# Patient Record
Sex: MALE | Race: BLACK OR AFRICAN AMERICAN | NOT HISPANIC OR LATINO | URBAN - METROPOLITAN AREA
[De-identification: names, ages, dates, MRNs, and addresses within clinical notes are randomized per-mention and may not be internally consistent; named-entity substitution may affect disease eponyms.]

---

## 2023-05-09 ENCOUNTER — INPATIENT (INPATIENT)
Facility: HOSPITAL | Age: 38
LOS: 8 days | Discharge: HOME HEALTH | DRG: 219 | End: 2023-05-18
Attending: THORACIC SURGERY (CARDIOTHORACIC VASCULAR SURGERY) | Admitting: THORACIC SURGERY (CARDIOTHORACIC VASCULAR SURGERY)
Payer: MEDICAID

## 2023-05-09 VITALS
RESPIRATION RATE: 18 BRPM | TEMPERATURE: 99 F | SYSTOLIC BLOOD PRESSURE: 130 MMHG | HEART RATE: 66 BPM | DIASTOLIC BLOOD PRESSURE: 62 MMHG | OXYGEN SATURATION: 98 %

## 2023-05-09 DIAGNOSIS — I10 ESSENTIAL (PRIMARY) HYPERTENSION: ICD-10-CM

## 2023-05-09 DIAGNOSIS — E11.9 TYPE 2 DIABETES MELLITUS WITHOUT COMPLICATIONS: ICD-10-CM

## 2023-05-09 DIAGNOSIS — I25.10 ATHEROSCLEROTIC HEART DISEASE OF NATIVE CORONARY ARTERY WITHOUT ANGINA PECTORIS: ICD-10-CM

## 2023-05-09 DIAGNOSIS — E78.5 HYPERLIPIDEMIA, UNSPECIFIED: ICD-10-CM

## 2023-05-09 DIAGNOSIS — I35.1 NONRHEUMATIC AORTIC (VALVE) INSUFFICIENCY: ICD-10-CM

## 2023-05-09 PROBLEM — Z00.00 ENCOUNTER FOR PREVENTIVE HEALTH EXAMINATION: Status: ACTIVE | Noted: 2023-05-09

## 2023-05-09 PROCEDURE — 99222 1ST HOSP IP/OBS MODERATE 55: CPT

## 2023-05-09 RX ORDER — SODIUM CHLORIDE 9 MG/ML
3 INJECTION INTRAMUSCULAR; INTRAVENOUS; SUBCUTANEOUS EVERY 8 HOURS
Refills: 0 | Status: DISCONTINUED | OUTPATIENT
Start: 2023-05-09 | End: 2023-05-12

## 2023-05-09 RX ORDER — INSULIN LISPRO 100/ML
VIAL (ML) SUBCUTANEOUS
Refills: 0 | Status: DISCONTINUED | OUTPATIENT
Start: 2023-05-09 | End: 2023-05-12

## 2023-05-09 RX ORDER — FUROSEMIDE 40 MG
40 TABLET ORAL DAILY
Refills: 0 | Status: DISCONTINUED | OUTPATIENT
Start: 2023-05-09 | End: 2023-05-10

## 2023-05-09 RX ORDER — INSULIN LISPRO 100/ML
VIAL (ML) SUBCUTANEOUS AT BEDTIME
Refills: 0 | Status: DISCONTINUED | OUTPATIENT
Start: 2023-05-09 | End: 2023-05-12

## 2023-05-09 NOTE — H&P ADULT - ASSESSMENT
37M, PmHx DM, HTN, HLD, "heart murmur secondary to an infection in my blood (3/2023)," Now presents to [facility] from [facility], for Severe AI/endocarditis eval. Patient initially presented to [facility] for evaluation of cough and shortness of breath x 4 days. Patient states that over the course the last 4 days he has been experiencing shortness of breath as well as cough productive of white to yellow sputum, and states that there was a small blood streak in the sputum today. Patient states that he has been experiencing chest discomfort for the last 2 days as well, and points to the epigastric and left side of the chest when asked where the pain is localized. Patient states that the shortness of breath is worse when laying down, and states he has never had this before. PE ruled out at [facility] and Negative troponins. Of note Patient states he has step bacteremia in [facility] in March 2023 and was send for on antibiotics which he completed in April 2023. Denies any fever, chills, palpitations, headache, dizziness, abdominal pain, nausea, vomiting, diarrhea, constipation, back pain, tearing sensation, pedal edema, calf discomfort.

## 2023-05-09 NOTE — H&P ADULT - NSHPPHYSICALEXAM_GEN_ALL_CORE
General: NAD  HEENT:  NC/AT  Neuro: A&Ox4, gait steady, speech clear, no focal deficits noted  Respiratory: BS CTA b/l, no wheezes, rales or rhonchi noted  Cardiovascular: RRR, (+) S1/S2, no murmur appreciated  GI: Abd soft, NT/ND, (+) BSx4Q (+) BM  Peripheral Vascular:  no LE edema b/l, 2+ peripheral pulses b/l, no varicosities/PVD noted  Musculoskeletal: B/L UE and LE 5/5 strength   Psychiatric: Normal mood, normal affect observed  Skin: Normal exam to inspection and palpation. no bleeding, no hematoma. General: NAD  HEENT:  NC/AT  Neuro: A&Ox4, gait steady, speech clear, no focal deficits noted  Respiratory: BS CTA b/l, no wheezes, rales or rhonchi noted  Cardiovascular: RRR, (+) S1/S2,  GI: Abd soft, NT/ND, (+) BSx4Q (+) BM  Peripheral Vascular:  no LE edema b/l, 2+ peripheral pulses b/l, no varicosities/PVD noted  Musculoskeletal: B/L UE and LE 5/5 strength   Psychiatric: Normal mood, normal affect observed  Skin: Normal exam to inspection and palpation. no bleeding, no hematoma.

## 2023-05-09 NOTE — H&P ADULT - HISTORY OF PRESENT ILLNESS
37M, PmHx DM, HTN, HLD, "heart murmur secondary to an infection in my blood (3/2023)," Now presents to [facility] from [facility], for Severe AI/endocarditis eval. Patient initially presented to [facility] for evaluation of cough and shortness of breath x 4 days. Patient states that over the course the last 4 days he has been experiencing shortness of breath as well as cough productive of white to yellow sputum, and states that there was a small blood streak in the sputum today. Patient states that he has been experiencing chest discomfort for the last 2 days as well, and points to the epigastric and left side of the chest when asked where the pain is localized. Patient states that the shortness of breath is worse when laying down, and states he has never had this before. PE ruled out at [facility] and Negative troponins. Of note Patient states he has step bacteremia in [facility] in March 2023 and was send for on antibiotics which he completed in April 2023. Denies any fever, chills, palpitations, headache, dizziness, abdominal pain, nausea, vomiting, diarrhea, constipation, back pain, tearing sensation, pedal edema, calf discomfort.

## 2023-05-09 NOTE — H&P ADULT - NSHPSOCIALHISTORY_GEN_ALL_CORE
SOCIAL HISTORY  Smoker: [ ] Yes  [X] No  ETOH use: [ ] Yes  [X] No  Illicit Drug use:  [ ] Yes  [X] No  Chemo/Rad to chest: No  Implantable Devices: No  ADLs: Independent  Residence: Lives at home SOCIAL HISTORY  Smoker: [ x] Yes  [] No    Patient states he does smoke weed occasionally   ETOH use: [ x] Yes  [] No   Patient states occasionally   Illicit Drug use:  [ ] Yes  [] No  Patient states he does smoke weed occasionally   Chemo/Rad to chest: No  Implantable Devices: No  ADLs: Independent  Residence: Lives at home with significant other

## 2023-05-09 NOTE — H&P ADULT - PROBLEM SELECTOR PLAN 1
Admit to Dr. Chavez  Preop workup initiated - Carotids, PFTs, Labs  Full Echo  Dental consult  ID consult Dr. Kirsten Mclean of Recent bacteremia/endocarditis Blood cultures sent  Complete Abx course on April 10th 2023  c/w Lasix 40 QD from [place] Admit to Dr. Chavez  Preop workup initiated - Carotids, PFTs, Labs  Full Echo  CXR  Dental consult  ID consult Dr. Curtis  Hx of Recent bacteremia/endocarditis Blood cultures sent  Complete Abx course on April 10th 2023  c/w Lasix 40 QD from [place]

## 2023-05-09 NOTE — H&P ADULT - NSHPREVIEWOFSYSTEMS_GEN_ALL_CORE
REVIEW OF SYSTEMS:  CONSTITUTIONAL: Denies fever, weight loss or fatigue  EYES: Denies eye pain, visual disturbances, or discharge  ENMT:  Denies difficulty hearing, tinnitus, vertigo, sinus or throat pain  NECK: Denies pain or stiffness  RESPIRATORY: + shortness of breath Denies cough, wheezing, chills, hemoptysis   CARDIOVASCULAR: Denies current chest pain, palpitations, dizziness, or leg swelling  GASTROINTESTINAL: Denies abdominal or epigastric pain, nausea, vomiting, hematemesis, diarrhea or melena  GENITOURINARY: Denies dysuria, frequency, hematuria, or incontinence  NEUROLOGICAL: Denies headaches, memory loss, loss of strength, numbness or tremors  SKIN: Denies itching, burning, rashes, or lesions   LYMPH NODES: Denies enlarged glands  ENDOCRINE: Denies heat or cold intolerance or hair loss  MUSCULOSKELETAL: Denies joint pain or swelling, muscle, back or extremity pain  PSYCHIATRIC: Denies depression, anxiety, mood swings or difficulty sleeping  HEME/LYMPH: Denies easy bruising or bleeding gums  ALLERGY: Denies hives or eczema

## 2023-05-10 ENCOUNTER — TRANSCRIPTION ENCOUNTER (OUTPATIENT)
Age: 38
End: 2023-05-10

## 2023-05-10 LAB
A1C WITH ESTIMATED AVERAGE GLUCOSE RESULT: 5.3 % — SIGNIFICANT CHANGE UP (ref 4–5.6)
ALBUMIN SERPL ELPH-MCNC: 4.4 G/DL — SIGNIFICANT CHANGE UP (ref 3.3–5)
ALBUMIN SERPL ELPH-MCNC: 4.4 G/DL — SIGNIFICANT CHANGE UP (ref 3.3–5)
ALP SERPL-CCNC: 49 U/L — SIGNIFICANT CHANGE UP (ref 40–120)
ALP SERPL-CCNC: 51 U/L — SIGNIFICANT CHANGE UP (ref 40–120)
ALT FLD-CCNC: 21 U/L — SIGNIFICANT CHANGE UP (ref 10–45)
ALT FLD-CCNC: 23 U/L — SIGNIFICANT CHANGE UP (ref 10–45)
ANION GAP SERPL CALC-SCNC: 14 MMOL/L — SIGNIFICANT CHANGE UP (ref 5–17)
ANION GAP SERPL CALC-SCNC: 16 MMOL/L — SIGNIFICANT CHANGE UP (ref 5–17)
APPEARANCE UR: CLEAR — SIGNIFICANT CHANGE UP
APTT BLD: 33.6 SEC — SIGNIFICANT CHANGE UP (ref 27.5–35.5)
AST SERPL-CCNC: 17 U/L — SIGNIFICANT CHANGE UP (ref 10–40)
AST SERPL-CCNC: 18 U/L — SIGNIFICANT CHANGE UP (ref 10–40)
BACTERIA # UR AUTO: NEGATIVE — SIGNIFICANT CHANGE UP
BASOPHILS # BLD AUTO: 0.04 K/UL — SIGNIFICANT CHANGE UP (ref 0–0.2)
BASOPHILS # BLD AUTO: 0.04 K/UL — SIGNIFICANT CHANGE UP (ref 0–0.2)
BASOPHILS NFR BLD AUTO: 0.6 % — SIGNIFICANT CHANGE UP (ref 0–2)
BASOPHILS NFR BLD AUTO: 0.6 % — SIGNIFICANT CHANGE UP (ref 0–2)
BILIRUB SERPL-MCNC: 0.6 MG/DL — SIGNIFICANT CHANGE UP (ref 0.2–1.2)
BILIRUB SERPL-MCNC: 0.6 MG/DL — SIGNIFICANT CHANGE UP (ref 0.2–1.2)
BILIRUB UR-MCNC: NEGATIVE — SIGNIFICANT CHANGE UP
BUN SERPL-MCNC: 23 MG/DL — SIGNIFICANT CHANGE UP (ref 7–23)
BUN SERPL-MCNC: 24 MG/DL — HIGH (ref 7–23)
CALCIUM SERPL-MCNC: 9.2 MG/DL — SIGNIFICANT CHANGE UP (ref 8.4–10.5)
CALCIUM SERPL-MCNC: 9.2 MG/DL — SIGNIFICANT CHANGE UP (ref 8.4–10.5)
CHLORIDE SERPL-SCNC: 100 MMOL/L — SIGNIFICANT CHANGE UP (ref 96–108)
CHLORIDE SERPL-SCNC: 101 MMOL/L — SIGNIFICANT CHANGE UP (ref 96–108)
CO2 SERPL-SCNC: 23 MMOL/L — SIGNIFICANT CHANGE UP (ref 22–31)
CO2 SERPL-SCNC: 24 MMOL/L — SIGNIFICANT CHANGE UP (ref 22–31)
COLOR SPEC: YELLOW — SIGNIFICANT CHANGE UP
CREAT SERPL-MCNC: 1.25 MG/DL — SIGNIFICANT CHANGE UP (ref 0.5–1.3)
CREAT SERPL-MCNC: 1.28 MG/DL — SIGNIFICANT CHANGE UP (ref 0.5–1.3)
DIFF PNL FLD: NEGATIVE — SIGNIFICANT CHANGE UP
EGFR: 73 ML/MIN/1.73M2 — SIGNIFICANT CHANGE UP
EGFR: 76 ML/MIN/1.73M2 — SIGNIFICANT CHANGE UP
EOSINOPHIL # BLD AUTO: 0.19 K/UL — SIGNIFICANT CHANGE UP (ref 0–0.5)
EOSINOPHIL # BLD AUTO: 0.2 K/UL — SIGNIFICANT CHANGE UP (ref 0–0.5)
EOSINOPHIL NFR BLD AUTO: 2.8 % — SIGNIFICANT CHANGE UP (ref 0–6)
EOSINOPHIL NFR BLD AUTO: 3.1 % — SIGNIFICANT CHANGE UP (ref 0–6)
EPI CELLS # UR: 0 /HPF — SIGNIFICANT CHANGE UP
ESTIMATED AVERAGE GLUCOSE: 105 MG/DL — SIGNIFICANT CHANGE UP (ref 68–114)
FIBRINOGEN PPP-MCNC: 457 MG/DL — HIGH (ref 200–445)
GLUCOSE BLDC GLUCOMTR-MCNC: 100 MG/DL — HIGH (ref 70–99)
GLUCOSE BLDC GLUCOMTR-MCNC: 106 MG/DL — HIGH (ref 70–99)
GLUCOSE BLDC GLUCOMTR-MCNC: 130 MG/DL — HIGH (ref 70–99)
GLUCOSE BLDC GLUCOMTR-MCNC: 91 MG/DL — SIGNIFICANT CHANGE UP (ref 70–99)
GLUCOSE SERPL-MCNC: 109 MG/DL — HIGH (ref 70–99)
GLUCOSE SERPL-MCNC: 93 MG/DL — SIGNIFICANT CHANGE UP (ref 70–99)
GLUCOSE UR QL: NEGATIVE — SIGNIFICANT CHANGE UP
HCT VFR BLD CALC: 36.7 % — LOW (ref 39–50)
HCT VFR BLD CALC: 37 % — LOW (ref 39–50)
HGB BLD-MCNC: 11.8 G/DL — LOW (ref 13–17)
HGB BLD-MCNC: 11.9 G/DL — LOW (ref 13–17)
HYALINE CASTS # UR AUTO: 2 /LPF — SIGNIFICANT CHANGE UP (ref 0–2)
IMM GRANULOCYTES NFR BLD AUTO: 0.3 % — SIGNIFICANT CHANGE UP (ref 0–0.9)
IMM GRANULOCYTES NFR BLD AUTO: 0.3 % — SIGNIFICANT CHANGE UP (ref 0–0.9)
INR BLD: 1.3 RATIO — HIGH (ref 0.88–1.16)
KETONES UR-MCNC: NEGATIVE — SIGNIFICANT CHANGE UP
LEUKOCYTE ESTERASE UR-ACNC: NEGATIVE — SIGNIFICANT CHANGE UP
LYMPHOCYTES # BLD AUTO: 2.47 K/UL — SIGNIFICANT CHANGE UP (ref 1–3.3)
LYMPHOCYTES # BLD AUTO: 2.75 K/UL — SIGNIFICANT CHANGE UP (ref 1–3.3)
LYMPHOCYTES # BLD AUTO: 38.9 % — SIGNIFICANT CHANGE UP (ref 13–44)
LYMPHOCYTES # BLD AUTO: 40.6 % — SIGNIFICANT CHANGE UP (ref 13–44)
MAGNESIUM SERPL-MCNC: 2.1 MG/DL — SIGNIFICANT CHANGE UP (ref 1.6–2.6)
MCHC RBC-ENTMCNC: 27.2 PG — SIGNIFICANT CHANGE UP (ref 27–34)
MCHC RBC-ENTMCNC: 27.3 PG — SIGNIFICANT CHANGE UP (ref 27–34)
MCHC RBC-ENTMCNC: 32.2 GM/DL — SIGNIFICANT CHANGE UP (ref 32–36)
MCHC RBC-ENTMCNC: 32.2 GM/DL — SIGNIFICANT CHANGE UP (ref 32–36)
MCV RBC AUTO: 84.5 FL — SIGNIFICANT CHANGE UP (ref 80–100)
MCV RBC AUTO: 85 FL — SIGNIFICANT CHANGE UP (ref 80–100)
MONOCYTES # BLD AUTO: 0.69 K/UL — SIGNIFICANT CHANGE UP (ref 0–0.9)
MONOCYTES # BLD AUTO: 0.73 K/UL — SIGNIFICANT CHANGE UP (ref 0–0.9)
MONOCYTES NFR BLD AUTO: 10.2 % — SIGNIFICANT CHANGE UP (ref 2–14)
MONOCYTES NFR BLD AUTO: 11.5 % — SIGNIFICANT CHANGE UP (ref 2–14)
MRSA PCR RESULT.: SIGNIFICANT CHANGE UP
NEUTROPHILS # BLD AUTO: 2.89 K/UL — SIGNIFICANT CHANGE UP (ref 1.8–7.4)
NEUTROPHILS # BLD AUTO: 3.09 K/UL — SIGNIFICANT CHANGE UP (ref 1.8–7.4)
NEUTROPHILS NFR BLD AUTO: 45.5 % — SIGNIFICANT CHANGE UP (ref 43–77)
NEUTROPHILS NFR BLD AUTO: 45.6 % — SIGNIFICANT CHANGE UP (ref 43–77)
NITRITE UR-MCNC: NEGATIVE — SIGNIFICANT CHANGE UP
NRBC # BLD: 0 /100 WBCS — SIGNIFICANT CHANGE UP (ref 0–0)
NRBC # BLD: 0 /100 WBCS — SIGNIFICANT CHANGE UP (ref 0–0)
NT-PROBNP SERPL-SCNC: 850 PG/ML — HIGH (ref 0–300)
PH UR: 6 — SIGNIFICANT CHANGE UP (ref 5–8)
PHOSPHATE SERPL-MCNC: 4.3 MG/DL — SIGNIFICANT CHANGE UP (ref 2.5–4.5)
PLATELET # BLD AUTO: 263 K/UL — SIGNIFICANT CHANGE UP (ref 150–400)
PLATELET # BLD AUTO: 267 K/UL — SIGNIFICANT CHANGE UP (ref 150–400)
POTASSIUM SERPL-MCNC: 3.1 MMOL/L — LOW (ref 3.5–5.3)
POTASSIUM SERPL-MCNC: 3.3 MMOL/L — LOW (ref 3.5–5.3)
POTASSIUM SERPL-MCNC: 3.8 MMOL/L — SIGNIFICANT CHANGE UP (ref 3.5–5.3)
POTASSIUM SERPL-SCNC: 3.1 MMOL/L — LOW (ref 3.5–5.3)
POTASSIUM SERPL-SCNC: 3.3 MMOL/L — LOW (ref 3.5–5.3)
POTASSIUM SERPL-SCNC: 3.8 MMOL/L — SIGNIFICANT CHANGE UP (ref 3.5–5.3)
PREALB SERPL-MCNC: 21 MG/DL — SIGNIFICANT CHANGE UP (ref 20–40)
PROT SERPL-MCNC: 7.6 G/DL — SIGNIFICANT CHANGE UP (ref 6–8.3)
PROT SERPL-MCNC: 7.7 G/DL — SIGNIFICANT CHANGE UP (ref 6–8.3)
PROT UR-MCNC: ABNORMAL
PROTHROM AB SERPL-ACNC: 15.1 SEC — HIGH (ref 10.5–13.4)
RBC # BLD: 4.32 M/UL — SIGNIFICANT CHANGE UP (ref 4.2–5.8)
RBC # BLD: 4.38 M/UL — SIGNIFICANT CHANGE UP (ref 4.2–5.8)
RBC # FLD: 14.2 % — SIGNIFICANT CHANGE UP (ref 10.3–14.5)
RBC # FLD: 14.3 % — SIGNIFICANT CHANGE UP (ref 10.3–14.5)
RBC CASTS # UR COMP ASSIST: 1 /HPF — SIGNIFICANT CHANGE UP (ref 0–4)
S AUREUS DNA NOSE QL NAA+PROBE: SIGNIFICANT CHANGE UP
SODIUM SERPL-SCNC: 139 MMOL/L — SIGNIFICANT CHANGE UP (ref 135–145)
SODIUM SERPL-SCNC: 139 MMOL/L — SIGNIFICANT CHANGE UP (ref 135–145)
SP GR SPEC: 1.03 — HIGH (ref 1.01–1.02)
T4 FREE SERPL-MCNC: 1.6 NG/DL — SIGNIFICANT CHANGE UP (ref 0.9–1.8)
TSH SERPL-MCNC: 1.04 UIU/ML — SIGNIFICANT CHANGE UP (ref 0.27–4.2)
UROBILINOGEN FLD QL: NEGATIVE — SIGNIFICANT CHANGE UP
WBC # BLD: 6.35 K/UL — SIGNIFICANT CHANGE UP (ref 3.8–10.5)
WBC # BLD: 6.78 K/UL — SIGNIFICANT CHANGE UP (ref 3.8–10.5)
WBC # FLD AUTO: 6.35 K/UL — SIGNIFICANT CHANGE UP (ref 3.8–10.5)
WBC # FLD AUTO: 6.78 K/UL — SIGNIFICANT CHANGE UP (ref 3.8–10.5)
WBC UR QL: 1 /HPF — SIGNIFICANT CHANGE UP (ref 0–5)

## 2023-05-10 PROCEDURE — 93356 MYOCRD STRAIN IMG SPCKL TRCK: CPT

## 2023-05-10 PROCEDURE — 75574 CT ANGIO HRT W/3D IMAGE: CPT | Mod: 26

## 2023-05-10 PROCEDURE — 93010 ELECTROCARDIOGRAM REPORT: CPT

## 2023-05-10 PROCEDURE — 99231 SBSQ HOSP IP/OBS SF/LOW 25: CPT

## 2023-05-10 PROCEDURE — 94010 BREATHING CAPACITY TEST: CPT | Mod: 26

## 2023-05-10 PROCEDURE — 99222 1ST HOSP IP/OBS MODERATE 55: CPT

## 2023-05-10 PROCEDURE — 71045 X-RAY EXAM CHEST 1 VIEW: CPT | Mod: 26

## 2023-05-10 PROCEDURE — 93880 EXTRACRANIAL BILAT STUDY: CPT | Mod: 26

## 2023-05-10 PROCEDURE — 93306 TTE W/DOPPLER COMPLETE: CPT | Mod: 26

## 2023-05-10 PROCEDURE — 70496 CT ANGIOGRAPHY HEAD: CPT | Mod: 26

## 2023-05-10 RX ORDER — POTASSIUM CHLORIDE 20 MEQ
20 PACKET (EA) ORAL DAILY
Refills: 0 | Status: DISCONTINUED | OUTPATIENT
Start: 2023-05-11 | End: 2023-05-12

## 2023-05-10 RX ORDER — ENOXAPARIN SODIUM 100 MG/ML
40 INJECTION SUBCUTANEOUS EVERY 24 HOURS
Refills: 0 | Status: DISCONTINUED | OUTPATIENT
Start: 2023-05-10 | End: 2023-05-10

## 2023-05-10 RX ORDER — HEPARIN SODIUM 5000 [USP'U]/ML
5000 INJECTION INTRAVENOUS; SUBCUTANEOUS EVERY 12 HOURS
Refills: 0 | Status: DISCONTINUED | OUTPATIENT
Start: 2023-05-10 | End: 2023-05-12

## 2023-05-10 RX ORDER — FUROSEMIDE 40 MG
40 TABLET ORAL ONCE
Refills: 0 | Status: COMPLETED | OUTPATIENT
Start: 2023-05-11 | End: 2023-05-11

## 2023-05-10 RX ORDER — POTASSIUM CHLORIDE 20 MEQ
20 PACKET (EA) ORAL
Refills: 0 | Status: COMPLETED | OUTPATIENT
Start: 2023-05-10 | End: 2023-05-10

## 2023-05-10 RX ORDER — POTASSIUM BICARBONATE 978 MG/1
25 TABLET, EFFERVESCENT ORAL
Refills: 0 | Status: DISCONTINUED | OUTPATIENT
Start: 2023-05-10 | End: 2023-05-12

## 2023-05-10 RX ADMIN — SODIUM CHLORIDE 3 MILLILITER(S): 9 INJECTION INTRAMUSCULAR; INTRAVENOUS; SUBCUTANEOUS at 21:49

## 2023-05-10 RX ADMIN — ENOXAPARIN SODIUM 40 MILLIGRAM(S): 100 INJECTION SUBCUTANEOUS at 06:17

## 2023-05-10 RX ADMIN — POTASSIUM BICARBONATE 25 MILLIEQUIVALENT(S): 978 TABLET, EFFERVESCENT ORAL at 07:18

## 2023-05-10 RX ADMIN — POTASSIUM BICARBONATE 25 MILLIEQUIVALENT(S): 978 TABLET, EFFERVESCENT ORAL at 08:12

## 2023-05-10 RX ADMIN — Medication 40 MILLIGRAM(S): at 07:18

## 2023-05-10 RX ADMIN — SODIUM CHLORIDE 3 MILLILITER(S): 9 INJECTION INTRAMUSCULAR; INTRAVENOUS; SUBCUTANEOUS at 07:19

## 2023-05-10 RX ADMIN — SODIUM CHLORIDE 3 MILLILITER(S): 9 INJECTION INTRAMUSCULAR; INTRAVENOUS; SUBCUTANEOUS at 13:40

## 2023-05-10 RX ADMIN — HEPARIN SODIUM 5000 UNIT(S): 5000 INJECTION INTRAVENOUS; SUBCUTANEOUS at 18:06

## 2023-05-10 RX ADMIN — Medication 20 MILLIEQUIVALENT(S): at 04:20

## 2023-05-10 RX ADMIN — Medication 20 MILLIEQUIVALENT(S): at 02:19

## 2023-05-10 NOTE — PROGRESS NOTE ADULT - SUBJECTIVE AND OBJECTIVE BOX
VITAL SIGNS    Subjective: "I'm feeling ok" Denies CP, palpitation, SOB, BRUNSON, HA, dizziness, N/V/D, fever or chills.  No acute event noted overnight.     Telemetry:  SB / SR 50-70     Vital Signs Last 24 Hrs  T(C): 36.6 (05-10-23 @ 11:58), Max: 37 (05-09-23 @ 21:20)  T(F): 97.9 (05-10-23 @ 11:58), Max: 98.6 (05-09-23 @ 21:20)  HR: 69 (05-10-23 @ 11:58) (61 - 69)  BP: 127/61 (05-10-23 @ 11:58) (112/66 - 130/62)  RR: 18 (05-10-23 @ 11:58) (18 - 18)  SpO2: 95% (05-10-23 @ 11:58) (95% - 98%)           05-10 @ 07:01  -  05-10 @ 12:51  --------------------------------------------------------  IN: 360 mL / OUT: 0 mL / NET: 360 mL      LABS  05-10    x   |  x   |  x   ----------------------------<  x   3.8   |  x   |  x     Ca    9.2      10 May 2023 05:39  Phos  4.3     05-10  Mg     2.1     05-10    TPro  7.6  /  Alb  4.4  /  TBili  0.6  /  DBili  x   /  AST  18  /  ALT  23  /  AlkPhos  49  05-10                                 11.9   6.35  )-----------( 263      ( 10 May 2023 05:41 )             37.0          PT/INR - ( 10 May 2023 01:13 )   PT: 15.1 sec;   INR: 1.30 ratio         PTT - ( 10 May 2023 01:13 )  PTT:33.6 sec        Daily Height in cm: 167.64 (10 May 2023 02:49)    Daily       CAPILLARY BLOOD GLUCOSE      POCT Blood Glucose.: 106 mg/dL (10 May 2023 11:36)  POCT Blood Glucose.: 91 mg/dL (10 May 2023 08:04)              PHYSICAL EXAM    Neurology: alert and oriented x 3, nonfocal, no gross deficits    CV: (+) Diastolic murmur    Lungs: CTA B/L     Abdomen: soft, nontender, nondistended, positive bowel sounds, (+) Flatus; (+) BM     :  Voiding               Extremities:  B/L LE (-) edema; negative calf tenderness; (+) 2 DP palpable        enoxaparin Injectable 40 milliGRAM(s) SubCutaneous every 24 hours  furosemide Tablet 40 milliGRAM(s) Oral daily  insulin lispro (ADMELOG) corrective regimen sliding scale SubCutaneous at bedtime  insulin lispro (ADMELOG) corrective regimen sliding scale SubCutaneous three times a day before meals  potassium bicarbonate/potassium citrate 25 milliEquivalent(s) Oral every 1 hour  sodium chloride 0.9% lock flush 3 milliLiter(s) IV Push every 8 hours    Physical Therapy Rec:   Home  [ X ]   Home w/ PT  [  ]  Rehab  [  ]    Discussed with Cardiothoracic Team at AM rounds.

## 2023-05-10 NOTE — DISCHARGE NOTE NURSING/CASE MANAGEMENT/SOCIAL WORK - PATIENT PORTAL LINK FT
You can access the FollowMyHealth Patient Portal offered by [facility] by registering at the following website: [email or web address]. By joining [employer]’s FollowMyHealth portal, you will also be able to view your health information using other applications (apps) compatible with our system.

## 2023-05-10 NOTE — PROGRESS NOTE ADULT - SUBJECTIVE AND OBJECTIVE BOX
VITAL SIGNS    Subjective:     Telemetry:      Vital Signs Last 24 Hrs  T(C): 36.6 (05-10-23 @ 11:58), Max: 37 (05-09-23 @ 21:20)  T(F): 97.9 (05-10-23 @ 11:58), Max: 98.6 (05-09-23 @ 21:20)  HR: 69 (05-10-23 @ 11:58) (61 - 69)  BP: 127/61 (05-10-23 @ 11:58) (112/66 - 130/62)  RR: 18 (05-10-23 @ 11:58) (18 - 18)  SpO2: 95% (05-10-23 @ 11:58) (95% - 98%)               05-10 @ 07:01  -  05-10 @ 12:44  --------------------------------------------------------  IN: 360 mL / OUT: 0 mL / NET: 360 mL        LABS  05-10    x   |  x   |  x   ----------------------------<  x   3.8   |  x   |  x     Ca    9.2      10 May 2023 05:39  Phos  4.3     05-10  Mg     2.1     05-10    TPro  7.6  /  Alb  4.4  /  TBili  0.6  /  DBili  x   /  AST  18  /  ALT  23  /  AlkPhos  49  05-10                                 11.9   6.35  )-----------( 263      ( 10 May 2023 05:41 )             37.0          PT/INR - ( 10 May 2023 01:13 )   PT: 15.1 sec;   INR: 1.30 ratio         PTT - ( 10 May 2023 01:13 )  PTT:33.6 sec        Daily Height in cm: 167.64 (10 May 2023 02:49)    Daily       CAPILLARY BLOOD GLUCOSE      POCT Blood Glucose.: 106 mg/dL (10 May 2023 11:36)  POCT Blood Glucose.: 91 mg/dL (10 May 2023 08:04)          Drains:     MS         [  ] Drainage:                 L Pleural  [  ]  Drainage:                R Pleural  [  ]  Drainage:    Pacing Wires        [  ]   Settings:                                  Isolated  [  ]    Coumadin    [ ] YES          [  ]      NO         Reason:           PHYSICAL EXAM      Neurology: alert and oriented x 3, nonfocal, no gross deficits    CV: (+)     Sternal Wound: MSI -->CDI, sternum stable    Lungs: CTA B/L     Abdomen: soft, nontender, nondistended, positive bowel sounds, (+) Flatus; (+) BM     :  Voiding               Extremities:  B/L LE (+) edema; negative calf tenderness; (+) 2 DP palpable          enoxaparin Injectable 40 milliGRAM(s) SubCutaneous every 24 hours  furosemide    Tablet 40 milliGRAM(s) Oral daily  insulin lispro (ADMELOG) corrective regimen sliding scale   SubCutaneous three times a day before meals  insulin lispro (ADMELOG) corrective regimen sliding scale   SubCutaneous at bedtime  potassium bicarbonate/potassium citrate 25 milliEquivalent(s) Oral every 1 hour  sodium chloride 0.9% lock flush 3 milliLiter(s) IV Push every 8 hours                 Physical Therapy Rec:   Home  [  ]   Home w/ PT  [  ]  Rehab  [  ]    Discussed with Cardiothoracic Team at AM rounds.

## 2023-05-10 NOTE — PROGRESS NOTE ADULT - PROBLEM SELECTOR PLAN 1
Preop workup initiated - Carotids, PFTs, Labs  Echo today   CTA head ordered to r/o mycotic aneurysm.    CT of heart with cors ordered for pre op cardiac surgery work up.   Dental consult called and appreciated   ID consult Dr. Curtis called and appreciated observe off Abx for now   Hx of Recent bacteremia/endocarditis Blood cultures sent  Complete Abx course on April 10th 2023  Continue with Lasix 40 QD   Supplement K+ 3.3  OR tentative for Friday 5/12.

## 2023-05-10 NOTE — PATIENT PROFILE ADULT - FALL HARM RISK - HARM RISK INTERVENTIONS

## 2023-05-11 ENCOUNTER — TRANSCRIPTION ENCOUNTER (OUTPATIENT)
Age: 38
End: 2023-05-11

## 2023-05-11 LAB
ANION GAP SERPL CALC-SCNC: 14 MMOL/L — SIGNIFICANT CHANGE UP (ref 5–17)
BLD GP AB SCN SERPL QL: NEGATIVE — SIGNIFICANT CHANGE UP
BUN SERPL-MCNC: 18 MG/DL — SIGNIFICANT CHANGE UP (ref 7–23)
CALCIUM SERPL-MCNC: 9.4 MG/DL — SIGNIFICANT CHANGE UP (ref 8.4–10.5)
CHLORIDE SERPL-SCNC: 100 MMOL/L — SIGNIFICANT CHANGE UP (ref 96–108)
CO2 SERPL-SCNC: 24 MMOL/L — SIGNIFICANT CHANGE UP (ref 22–31)
CREAT SERPL-MCNC: 1.07 MG/DL — SIGNIFICANT CHANGE UP (ref 0.5–1.3)
EGFR: 91 ML/MIN/1.73M2 — SIGNIFICANT CHANGE UP
GLUCOSE BLDC GLUCOMTR-MCNC: 107 MG/DL — HIGH (ref 70–99)
GLUCOSE BLDC GLUCOMTR-MCNC: 107 MG/DL — HIGH (ref 70–99)
GLUCOSE BLDC GLUCOMTR-MCNC: 109 MG/DL — HIGH (ref 70–99)
GLUCOSE BLDC GLUCOMTR-MCNC: 90 MG/DL — SIGNIFICANT CHANGE UP (ref 70–99)
GLUCOSE SERPL-MCNC: 108 MG/DL — HIGH (ref 70–99)
HCT VFR BLD CALC: 37.7 % — LOW (ref 39–50)
HGB BLD-MCNC: 12.2 G/DL — LOW (ref 13–17)
MCHC RBC-ENTMCNC: 27.7 PG — SIGNIFICANT CHANGE UP (ref 27–34)
MCHC RBC-ENTMCNC: 32.4 GM/DL — SIGNIFICANT CHANGE UP (ref 32–36)
MCV RBC AUTO: 85.5 FL — SIGNIFICANT CHANGE UP (ref 80–100)
NRBC # BLD: 0 /100 WBCS — SIGNIFICANT CHANGE UP (ref 0–0)
PLATELET # BLD AUTO: 253 K/UL — SIGNIFICANT CHANGE UP (ref 150–400)
POTASSIUM SERPL-MCNC: 3.5 MMOL/L — SIGNIFICANT CHANGE UP (ref 3.5–5.3)
POTASSIUM SERPL-SCNC: 3.5 MMOL/L — SIGNIFICANT CHANGE UP (ref 3.5–5.3)
RBC # BLD: 4.41 M/UL — SIGNIFICANT CHANGE UP (ref 4.2–5.8)
RBC # FLD: 14 % — SIGNIFICANT CHANGE UP (ref 10.3–14.5)
RH IG SCN BLD-IMP: POSITIVE — SIGNIFICANT CHANGE UP
SODIUM SERPL-SCNC: 138 MMOL/L — SIGNIFICANT CHANGE UP (ref 135–145)
WBC # BLD: 6.41 K/UL — SIGNIFICANT CHANGE UP (ref 3.8–10.5)
WBC # FLD AUTO: 6.41 K/UL — SIGNIFICANT CHANGE UP (ref 3.8–10.5)

## 2023-05-11 PROCEDURE — 99232 SBSQ HOSP IP/OBS MODERATE 35: CPT

## 2023-05-11 PROCEDURE — 99232 SBSQ HOSP IP/OBS MODERATE 35: CPT | Mod: 24

## 2023-05-11 RX ORDER — CHLORHEXIDINE GLUCONATE 213 G/1000ML
30 SOLUTION TOPICAL ONCE
Refills: 0 | Status: DISCONTINUED | OUTPATIENT
Start: 2023-05-11 | End: 2023-05-12

## 2023-05-11 RX ORDER — PHYTONADIONE (VIT K1) 5 MG
10 TABLET ORAL ONCE
Refills: 0 | Status: COMPLETED | OUTPATIENT
Start: 2023-05-11 | End: 2023-05-11

## 2023-05-11 RX ORDER — ASCORBIC ACID 60 MG
2000 TABLET,CHEWABLE ORAL ONCE
Refills: 0 | Status: DISCONTINUED | OUTPATIENT
Start: 2023-05-11 | End: 2023-05-12

## 2023-05-11 RX ORDER — CHLORHEXIDINE GLUCONATE 213 G/1000ML
1 SOLUTION TOPICAL ONCE
Refills: 0 | Status: COMPLETED | OUTPATIENT
Start: 2023-05-11 | End: 2023-05-11

## 2023-05-11 RX ORDER — ACETAMINOPHEN 500 MG
1000 TABLET ORAL ONCE
Refills: 0 | Status: DISCONTINUED | OUTPATIENT
Start: 2023-05-11 | End: 2023-05-12

## 2023-05-11 RX ORDER — CEFUROXIME AXETIL 250 MG
1500 TABLET ORAL ONCE
Refills: 0 | Status: DISCONTINUED | OUTPATIENT
Start: 2023-05-11 | End: 2023-05-12

## 2023-05-11 RX ORDER — GABAPENTIN 400 MG/1
300 CAPSULE ORAL ONCE
Refills: 0 | Status: DISCONTINUED | OUTPATIENT
Start: 2023-05-11 | End: 2023-05-12

## 2023-05-11 RX ADMIN — HEPARIN SODIUM 5000 UNIT(S): 5000 INJECTION INTRAVENOUS; SUBCUTANEOUS at 17:02

## 2023-05-11 RX ADMIN — Medication 20 MILLIEQUIVALENT(S): at 11:47

## 2023-05-11 RX ADMIN — Medication 102 MILLIGRAM(S): at 19:37

## 2023-05-11 RX ADMIN — HEPARIN SODIUM 5000 UNIT(S): 5000 INJECTION INTRAVENOUS; SUBCUTANEOUS at 05:00

## 2023-05-11 RX ADMIN — SODIUM CHLORIDE 3 MILLILITER(S): 9 INJECTION INTRAMUSCULAR; INTRAVENOUS; SUBCUTANEOUS at 13:29

## 2023-05-11 RX ADMIN — CHLORHEXIDINE GLUCONATE 1 APPLICATION(S): 213 SOLUTION TOPICAL at 22:00

## 2023-05-11 RX ADMIN — SODIUM CHLORIDE 3 MILLILITER(S): 9 INJECTION INTRAMUSCULAR; INTRAVENOUS; SUBCUTANEOUS at 05:00

## 2023-05-11 RX ADMIN — SODIUM CHLORIDE 3 MILLILITER(S): 9 INJECTION INTRAMUSCULAR; INTRAVENOUS; SUBCUTANEOUS at 21:59

## 2023-05-11 RX ADMIN — Medication 40 MILLIGRAM(S): at 11:47

## 2023-05-11 NOTE — PROGRESS NOTE ADULT - SUBJECTIVE AND OBJECTIVE BOX
Cardiac Surgery Pre-op Note:  CC: Patient is a 37y old  Male who presents with a chief complaint of Endocarditis eval (10 May 2023 21:08)      Referring Physician:                                                                                               Surgeon: Dr. Chavez   Procedure: (2023)  AVR POSS MVR     Allergies    No Known Allergies    Intolerances      HPI:  37M, PmHx DM, HTN, HLD, "heart murmur secondary to an infection in my blood (3/2023)," Now presents to [facility] from [facility], for Severe AI/endocarditis eval. Patient initially presented to [place]'s ER for evaluation of cough and shortness of breath x 4 days. Patient states that over the course the last 4 days he has been experiencing shortness of breath as well as cough productive of white to yellow sputum, and states that there was a small blood streak in the sputum today. Patient states that he has been experiencing chest discomfort for the last 2 days as well, and points to the epigastric and left side of the chest when asked where the pain is localized. Patient states that the shortness of breath is worse when laying down, and states he has never had this before. PE ruled out at [facility] and Negative troponins. Of note Patient states he has step bacteremia in [facility] in 2023 and was send for on antibiotics which he completed in 2023. Denies any fever, chills, palpitations, headache, dizziness, abdominal pain, nausea, vomiting, diarrhea, constipation, back pain, tearing sensation, pedal edema, calf discomfort.  (09 May 2023 22:47)      PAST MEDICAL & SURGICAL HISTORY:  HTN (hypertension)      HLD (hyperlipidemia)      DM (diabetes mellitus)          MEDICATIONS  (STANDING):  acetaminophen     Tablet .. 1000 milliGRAM(s) Oral once  ascorbic acid 2000 milliGRAM(s) Oral once  cefuroxime  IVPB 1500 milliGRAM(s) IV Intermittent once  chlorhexidine 0.12% Liquid 30 milliLiter(s) Swish and Spit once  chlorhexidine 4% Liquid 1 Application(s) Topical once  furosemide    Tablet 40 milliGRAM(s) Oral once  gabapentin 300 milliGRAM(s) Oral once  heparin   Injectable 5000 Unit(s) SubCutaneous every 12 hours  insulin lispro (ADMELOG) corrective regimen sliding scale   SubCutaneous three times a day before meals  insulin lispro (ADMELOG) corrective regimen sliding scale   SubCutaneous at bedtime  potassium bicarbonate/potassium citrate 25 milliEquivalent(s) Oral every 1 hour  potassium chloride    Tablet ER 20 milliEquivalent(s) Oral daily  sodium chloride 0.9% lock flush 3 milliLiter(s) IV Push every 8 hours    MEDICATIONS  (PRN):      Labs:                        12.2   6.41  )-----------( 253      ( 11 May 2023 06:24 )             37.7     05-11    138  |  100  |  18  ----------------------------<  108<H>  3.5   |  24  |  1.07    Ca    9.4      11 May 2023 06:24  Phos  4.3     05-10  Mg     2.1     -10    TPro  7.6  /  Alb  4.4  /  TBili  0.6  /  DBili  x   /  AST  18  /  ALT  23  /  AlkPhos  49  05-10    PT/INR - ( 10 May 2023 01:13 )   PT: 15.1 sec;   INR: 1.30 ratio         PTT - ( 10 May 2023 01:13 )  PTT:33.6 sec    Blood Type: ABO Interpretation: O (05-10 @ 01:23)    HGB A1C:   Prealbumin: Prealbumin, Serum: 21 mg/dL (05-10 @ 01:13)    Pro-BNP:   Thyroid Panel: 05-10 @ 01:13/1.04  1.6/--/--    MRSA: MRSA PCR Result.: NotDetec (05-10 @ 01:13)   / MSSA:   Urinalysis Basic - ( 10 May 2023 02:23 )    Color: Yellow / Appearance: Clear / S.026 / pH: x  Gluc: x / Ketone: Negative  / Bili: Negative / Urobili: Negative   Blood: x / Protein: Trace / Nitrite: Negative   Leuk Esterase: Negative / RBC: 1 /hpf / WBC 1 /HPF   Sq Epi: x / Non Sq Epi: x / Bacteria: Negative        CXR:     ACC: [id number] EXAM:  XR CHEST AP OR PA 1V   ORDERED BY: JULIET SALAS     PROCEDURE DATE:  05/10/2023          INTERPRETATION:  CLINICAL INDICATION:  Shortness of breath    COMPARISON: None    TECHNIQUE: Frontal radiograph of the chest.    FINDINGS:    Cardiac/mediastinum/hilum: Heart size cannot be accurately assessed on   this projection.    Lung parenchyma/Pleura: The lungs are clear. There is no pleural   effusion. There is no pneumothorax.    Skeleton/soft tissues: No acute osseous abnormalities.    IMPRESSION:    Clear lungs.      EKG:        Carotid Duplex:        ACC: [id number] EXAM:  CAROTID DUPLEX BILATERAL   ORDERED BY: CHICO FENG     PROCEDURE DATE:  05/10/2023          INTERPRETATION:  CLINICAL INFORMATION: Preop aortic valve repair    COMPARISON: None available.    TECHNIQUE: Grayscale, color and spectral Doppler examination of both   carotid arteries was performed.    FINDINGS:    Minimal atheromatous intimal thickening and irregularity is present along   the course of the carotid arteries in the neck.    Peak systolic velocities are as follows:    RIGHT:  PROX CCA = 129  DIST CCA = 149  PROX ICA = 94  DIST ICA = 110  ECA = 157    LEFT:  PROX CCA = 212  DIST CCA = 153  PROX ICA = 100  DIST ICA = 72  ECA = 96    Antegrade flow is noted within both vertebral arteries.    IMPRESSION: No hemodynamically significant internal carotid artery   stenoses are identified.    Measurement of carotid stenosis is based on velocity parameters that   correlate the residual internal carotid diameter with that of the more   distal vessel in accordance with a method such as the North American   Symptomatic Carotid Endarterectomy Trial (NASCET).    --- End of Report ---        PFT's: Completed        Echocardiogram:      TRANSTHORACIC ECHOCARDIOGRAM REPORT  ________________________________________________________________________________                                      _______       Pt. Name:       WASHINGTNO REED  Study Date:    5/10/2023  MRN:            [id number]   YOB: 1985  Accession #:    [id number]    Age:           37 years  Account#:       [id number] Gender:        M  Heart Rate:                  Height:        66.00 in (167.64 cm)  Rhythm:                      Weight:        177.00 lb (80.29 kg)  Blood Pressure: 143/81 mmHg  BSA/BMI:       1.90 m² / 28.57 kg/m²  ________________________________________________________________________________________  Referring Physician:    [id number] Kindra Chavez  Interpreting Physician: Damaso Ruiz MD  Primary Sonographer:    Yaz Grayson [facility]  Fellow (Interpreting):  Jone Garcia    CPT:               ECHO TTE WO CON COMP W DOPP - 10186.m; MYOCARDIAL STRAIN                     IMAGING - 88605.m  Indication(s):     Endocarditis,valve unspecified - I38  Procedure:         Transthoracic echocardiogram with 2-D, M-mode and complete                     spectral and color flow Doppler. Strain imaging performed for                     evaluation of regional and global myocardial shape and                     dimensions.  Ordering Location: [facility]  Study Information: Image quality for this study is good.    _______________________________________________________________________________________  CONCLUSIONS:      1. Severely dilated left ventricular cavity size. The left ventricular wall thickness is normal. The left ventricular systolic function is normal with an ejection fraction visually estimated at 65 to 70 %. There are no regional wall motion abnormalities seen.   2. Normal right ventricular cavity size, normal wall thickness and normal systolic function.   3. The aortic valve appears deformed, with the RCC prolapsing into the LVOT during diastole.   4. Severe aortic regurgitation.    ________________________________________________________________________________________  FINDINGS:  Left Ventricle:  Severely dilated left ventricular cavity size. The left ventricular wall thickness is normal. The left ventricular systolic function is normal with an ejection fraction visually estimated at 65 to 70%. There are no regional wall motion abnormalities seen. Elevated filling pressure.     Right Ventricle:  Normal right ventricular cavity size, normal wall thickness and normal systolic function. Tricuspid annular plane systolic excursion (TAPSE) is 2.3 cm (normal >=1.7 cm).     Left Atrium:  The left atrium is normal, with an indexed volume of 41 ml/m².     Right Atrium:  The right atrium is normal.     Aortic Valve:  The aortic valve appears trileaflet. The aortic valve appears deformed, with the RCC prolapsing into the LVOT during diastole. There is severe aortic regurgitation. AI VMax is 5.24 m/s. AI pressure half time is 276 msec. AI slope is 5.67 m/s².     Mitral Valve:  There is mitral valve thickeningof the anterior and posterior leaflets. There is mild to moderate mitral regurgitation.     Tricuspid Valve:  Structurally normal tricuspid valve with normal leaflet excursion. There is mild tricuspid regurgitation.     Pulmonic Valve:  Structurally normal pulmonic valve with normal leaflet excursion. There is trace pulmonic regurgitation.     Aorta:  The aortic annulus and aortic root appear normal in size.     Pericardium:  No pericardial effusion seen.  ____________________________________________________________________  QUANTITATIVE DATA  Left Ventricle Measurements                         Indexed to BSA  IVSd (2D):   1.0 cm                Strain Measurements  LVPWd (2D):  1.0 cm                Global Pk Long Strain:  -21.3 %  LVIDd (2D):  6.1 cm                Endo Pk Strain A4C:     -22.3 %  LVIDs (2D):  3.8 cm                Endo Pk Strain A2C:     -18.3 %  LV Mass:     270 g    142.4 g/m²   Endo Pk Strain A3C:     -23.2 %     MV E Vmax:    1.23 m/s  MV A Vmax:    0.35 m/s  MV E/A:       3.54  e' lateral:   11.70 cm/s  e' medial:    12.20 cm/s  E/e' lateral: 10.51  E/e' medial:  10.08  E/e' Average: 10.29    Aorta Measurements     Ao Root: 3.0 cm       Left Atrium Measurements     LA Diam 2D: 4.50 cm    Right Ventricle Measurements     TAPSE: 2.3 cm       LVOT / RVOT/ Qp/Qs Data:  LVOT Vmax: 1.29 m/s  LVOT VTI:  25.70 cm    Aortic Valve Measurements     AV Vmax:          231.0 cm/s  AV Peak Gradient: 21.3 mmHg  AV Mean Gradient: 11.0 mmHg  AV VTI:           41.4 cm  AV VTIRatio:     0.62    Mitral Valve Measurements     MV E Vmax: 1.2 m/s  MV A Vmax: 0.3 m/s  MV E/A:    3.5       Tricuspid Valve Measurements     TR Vmax:          3.1 m/s  TR Peak Gradient: 37.7 mmHg        Gen: WN/WD NAD  Neuro: AAOx3, nonfocal  Pulm: CTA B/L  CV: RRR, S1S2 +systolic murmur  Abd: Soft, NT, ND +BS  Ext: No edema, + peripheral pulses      Pt has AICD/PPM [ ] Yes  [x ] No              Pre-op Beta Blocker ordered within 24 hrs of surgery (CABG ONLY)?  [ ] Yes  [X ] No Not a CABG   Type & Cross  [X ] Yes  [ ] No  NPO after Midnight [x ] Yes  [ ] No  Pre-op ABX ordered, to be taped on chart:  [ x] Yes  [ ] No     Hibiclens/Peridex ordered [x ] Yes  [ ] No  Intraop on Hold: PRBCs, CXR, ROSARIO [x ]   Consent obtained  [x ] Yes  [ ] No   Cardiac Surgery Pre-op Note:  CC: Patient is a 37y old  Male who presents with a chief complaint of Endocarditis eval (10 May 2023 21:08)      Referring Physician:                                                                                               Surgeon: Dr. Chavez   Procedure: (2023)  AVR POSS MVR     Allergies    No Known Allergies    Intolerances      HPI:  37M, PmHx DM, HTN, HLD, "heart murmur secondary to an infection in my blood (3/2023)," Now presents to [facility] from [facility], for Severe AI/endocarditis eval. Patient initially presented to [place] ER for evaluation of cough and shortness of breath x 4 days. Patient states that over the course the last 4 days he has been experiencing shortness of breath as well as cough productive of white to yellow sputum, and states that there was a small blood streak in the sputum today. Patient states that he has been experiencing chest discomfort for the last 2 days as well, and points to the epigastric and left side of the chest when asked where the pain is localized. Patient states that the shortness of breath is worse when laying down, and states he has never had this before. PE ruled out at [facility] and Negative troponins. Of note Patient states he has step bacteremia in [facility] in 2023 and was send for on antibiotics which he completed in 2023. Denies any fever, chills, palpitations, headache, dizziness, abdominal pain, nausea, vomiting, diarrhea, constipation, back pain, tearing sensation, pedal edema, calf discomfort.  (09 May 2023 22:47)      PAST MEDICAL & SURGICAL HISTORY:  HTN (hypertension)      HLD (hyperlipidemia)      DM (diabetes mellitus)          MEDICATIONS  (STANDING):  acetaminophen     Tablet .. 1000 milliGRAM(s) Oral once  ascorbic acid 2000 milliGRAM(s) Oral once  cefuroxime  IVPB 1500 milliGRAM(s) IV Intermittent once  chlorhexidine 0.12% Liquid 30 milliLiter(s) Swish and Spit once  chlorhexidine 4% Liquid 1 Application(s) Topical once  furosemide    Tablet 40 milliGRAM(s) Oral once  gabapentin 300 milliGRAM(s) Oral once  heparin   Injectable 5000 Unit(s) SubCutaneous every 12 hours  insulin lispro (ADMELOG) corrective regimen sliding scale   SubCutaneous three times a day before meals  insulin lispro (ADMELOG) corrective regimen sliding scale   SubCutaneous at bedtime  potassium bicarbonate/potassium citrate 25 milliEquivalent(s) Oral every 1 hour  potassium chloride    Tablet ER 20 milliEquivalent(s) Oral daily  sodium chloride 0.9% lock flush 3 milliLiter(s) IV Push every 8 hours    MEDICATIONS  (PRN):      Labs:                        12.2   6.41  )-----------( 253      ( 11 May 2023 06:24 )             37.7     05-11    138  |  100  |  18  ----------------------------<  108<H>  3.5   |  24  |  1.07    Ca    9.4      11 May 2023 06:24  Phos  4.3     05-10  Mg     2.1     -10    TPro  7.6  /  Alb  4.4  /  TBili  0.6  /  DBili  x   /  AST  18  /  ALT  23  /  AlkPhos  49  05-10    PT/INR - ( 10 May 2023 01:13 )   PT: 15.1 sec;   INR: 1.30 ratio         PTT - ( 10 May 2023 01:13 )  PTT:33.6 sec    Blood Type: ABO Interpretation: O (05-10 @ 01:23)    HGB A1C:   Prealbumin: Prealbumin, Serum: 21 mg/dL (05-10 @ 01:13)    Pro-BNP:   Thyroid Panel: 05-10 @ 01:13/1.04  1.6/--/--    MRSA: MRSA PCR Result.: NotDetec (05-10 @ 01:13)   / MSSA:   Urinalysis Basic - ( 10 May 2023 02:23 )    Color: Yellow / Appearance: Clear / S.026 / pH: x  Gluc: x / Ketone: Negative  / Bili: Negative / Urobili: Negative   Blood: x / Protein: Trace / Nitrite: Negative   Leuk Esterase: Negative / RBC: 1 /hpf / WBC 1 /HPF   Sq Epi: x / Non Sq Epi: x / Bacteria: Negative        CXR:     ACC: [id number] EXAM:  XR CHEST AP OR PA 1V   ORDERED BY: JULIET SALAS     PROCEDURE DATE:  05/10/2023          INTERPRETATION:  CLINICAL INDICATION:  Shortness of breath    COMPARISON: None    TECHNIQUE: Frontal radiograph of the chest.    FINDINGS:    Cardiac/mediastinum/hilum: Heart size cannot be accurately assessed on   this projection.    Lung parenchyma/Pleura: The lungs are clear. There is no pleural   effusion. There is no pneumothorax.    Skeleton/soft tissues: No acute osseous abnormalities.    IMPRESSION:    Clear lungs.      EKG:        Carotid Duplex:        ACC: [id number] EXAM:  CAROTID DUPLEX BILATERAL   ORDERED BY: CHICO FENG     PROCEDURE DATE:  05/10/2023          INTERPRETATION:  CLINICAL INFORMATION: Preop aortic valve repair    COMPARISON: None available.    TECHNIQUE: Grayscale, color and spectral Doppler examination of both   carotid arteries was performed.    FINDINGS:    Minimal atheromatous intimal thickening and irregularity is present along   the course of the carotid arteries in the neck.    Peak systolic velocities are as follows:    RIGHT:  PROX CCA = 129  DIST CCA = 149  PROX ICA = 94  DIST ICA = 110  ECA = 157    LEFT:  PROX CCA = 212  DIST CCA = 153  PROX ICA = 100  DIST ICA = 72  ECA = 96    Antegrade flow is noted within both vertebral arteries.    IMPRESSION: No hemodynamically significant internal carotid artery   stenoses are identified.    Measurement of carotid stenosis is based on velocity parameters that   correlate the residual internal carotid diameter with that of the more   distal vessel in accordance with a method such as the North American   Symptomatic Carotid Endarterectomy Trial (NASCET).    --- End of Report ---        PFT's: Completed        Echocardiogram:      TRANSTHORACIC ECHOCARDIOGRAM REPORT  ________________________________________________________________________________                                      _______       Pt. Name:       WASHINGTON REED  Study Date:    5/10/2023  MRN:            [id number]   YOB: 1985  Accession #:    [id number]    Age:           37 years  Account#:       [id number] Gender:        M  Heart Rate:                  Height:        66.00 in (167.64 cm)  Rhythm:                      Weight:        177.00 lb (80.29 kg)  Blood Pressure: 143/81 mmHg  BSA/BMI:       1.90 m² / 28.57 kg/m²  ________________________________________________________________________________________  Referring Physician:    [id number] Kindra Chavez  Interpreting Physician: Damaso Ruiz MD  Primary Sonographer:    Yaz Grayson [facility] [place]  Fellow (Interpreting):  Jone Garcia    CPT:               ECHO TTE WO CON COMP W DOPP - 86048.m; MYOCARDIAL STRAIN                     IMAGING - 46596.m  Indication(s):     Endocarditis,valve unspecified - I38  Procedure:         Transthoracic echocardiogram with 2-D, M-mode and complete                     spectral and color flow Doppler. Strain imaging performed for                     evaluation of regional and global myocardial shape and                     dimensions.  Ordering Location: [facility]  Study Information: Image quality for this study is good.    _______________________________________________________________________________________  CONCLUSIONS:      1. Severely dilated left ventricular cavity size. The left ventricular wall thickness is normal. The left ventricular systolic function is normal with an ejection fraction visually estimated at 65 to 70 %. There are no regional wall motion abnormalities seen.   2. Normal right ventricular cavity size, normal wall thickness and normal systolic function.   3. The aortic valve appears deformed, with the RCC prolapsing into the LVOT during diastole.   4. Severe aortic regurgitation.    ________________________________________________________________________________________  FINDINGS:  Left Ventricle:  Severely dilated left ventricular cavity size. The left ventricular wall thickness is normal. The left ventricular systolic function is normal with an ejection fraction visually estimated at 65 to 70%. There are no regional wall motion abnormalities seen. Elevated filling pressure.     Right Ventricle:  Normal right ventricular cavity size, normal wall thickness and normal systolic function. Tricuspid annular plane systolic excursion (TAPSE) is 2.3 cm (normal >=1.7 cm).     Left Atrium:  The left atrium is normal, with an indexed volume of 41 ml/m².     Right Atrium:  The right atrium is normal.     Aortic Valve:  The aortic valve appears trileaflet. The aortic valve appears deformed, with the RCC prolapsing into the LVOT during diastole. There is severe aortic regurgitation. AI VMax is 5.24 m/s. AI pressure half time is 276 msec. AI slope is 5.67 m/s².     Mitral Valve:  There is mitral valve thickeningof the anterior and posterior leaflets. There is mild to moderate mitral regurgitation.     Tricuspid Valve:  Structurally normal tricuspid valve with normal leaflet excursion. There is mild tricuspid regurgitation.     Pulmonic Valve:  Structurally normal pulmonic valve with normal leaflet excursion. There is trace pulmonic regurgitation.     Aorta:  The aortic annulus and aortic root appear normal in size.     Pericardium:  No pericardial effusion seen.  ____________________________________________________________________  QUANTITATIVE DATA  Left Ventricle Measurements                         Indexed to BSA  IVSd (2D):   1.0 cm                Strain Measurements  LVPWd (2D):  1.0 cm                Global Pk Long Strain:  -21.3 %  LVIDd (2D):  6.1 cm                Endo Pk Strain A4C:     -22.3 %  LVIDs (2D):  3.8 cm                Endo Pk Strain A2C:     -18.3 %  LV Mass:     270 g    142.4 g/m²   Endo Pk Strain A3C:     -23.2 %     MV E Vmax:    1.23 m/s  MV A Vmax:    0.35 m/s  MV E/A:       3.54  e' lateral:   11.70 cm/s  e' medial:    12.20 cm/s  E/e' lateral: 10.51  E/e' medial:  10.08  E/e' Average: 10.29    Aorta Measurements     Ao Root: 3.0 cm       Left Atrium Measurements     LA Diam 2D: 4.50 cm    Right Ventricle Measurements     TAPSE: 2.3 cm       LVOT / RVOT/ Qp/Qs Data:  LVOT Vmax: 1.29 m/s  LVOT VTI:  25.70 cm    Aortic Valve Measurements     AV Vmax:          231.0 cm/s  AV Peak Gradient: 21.3 mmHg  AV Mean Gradient: 11.0 mmHg  AV VTI:           41.4 cm  AV VTIRatio:     0.62    Mitral Valve Measurements     MV E Vmax: 1.2 m/s  MV A Vmax: 0.3 m/s  MV E/A:    3.5       Tricuspid Valve Measurements     TR Vmax:          3.1 m/s  TR Peak Gradient: 37.7 mmHg        Gen: WN/WD NAD  Neuro: AAOx3, nonfocal  Pulm: CTA B/L  CV: RRR, S1S2 +systolic murmur  Abd: Soft, NT, ND +BS  Ext: No edema, + peripheral pulses  Skin- No rashes/wounds/lesions noted on sternum     Pt has AICD/PPM [ ] Yes  [x ] No              Pre-op Beta Blocker ordered within 24 hrs of surgery (CABG ONLY)?  [ ] Yes  [X ] No Not a CABG   Type & Cross  [X ] Yes  [ ] No  NPO after Midnight [x ] Yes  [ ] No  Pre-op ABX ordered, to be taped on chart:  [ x] Yes  [ ] No     Hibiclens/Peridex ordered [x ] Yes  [ ] No  Intraop on Hold: PRBCs, CXR, ROSARIO [x ]   Consent obtained  [x ] Yes  [ ] No

## 2023-05-11 NOTE — PROGRESS NOTE ADULT - SUBJECTIVE AND OBJECTIVE BOX
infectious diseases progress note:    Patient is a 37y old  Male who presents with a chief complaint of Endocarditis eval (11 May 2023 07:55)        Atherosclerosis of native coronary artery without angina pectoris               Allergies    No Known Allergies    Intolerances        ANTIBIOTICS/RELEVANT:  antimicrobials  cefuroxime  IVPB 1500 milliGRAM(s) IV Intermittent once    immunologic:    OTHER:  acetaminophen     Tablet .. 1000 milliGRAM(s) Oral once  ascorbic acid 2000 milliGRAM(s) Oral once  chlorhexidine 0.12% Liquid 30 milliLiter(s) Swish and Spit once  chlorhexidine 4% Liquid 1 Application(s) Topical once  furosemide    Tablet 40 milliGRAM(s) Oral once  gabapentin 300 milliGRAM(s) Oral once  heparin   Injectable 5000 Unit(s) SubCutaneous every 12 hours  insulin lispro (ADMELOG) corrective regimen sliding scale   SubCutaneous three times a day before meals  insulin lispro (ADMELOG) corrective regimen sliding scale   SubCutaneous at bedtime  potassium bicarbonate/potassium citrate 25 milliEquivalent(s) Oral every 1 hour  potassium chloride    Tablet ER 20 milliEquivalent(s) Oral daily  sodium chloride 0.9% lock flush 3 milliLiter(s) IV Push every 8 hours      Objective:  Vital Signs Last 24 Hrs  T(C): 36.9 (11 May 2023 05:10), Max: 36.9 (11 May 2023 05:10)  T(F): 98.4 (11 May 2023 05:10), Max: 98.4 (11 May 2023 05:10)  HR: 67 (11 May 2023 05:10) (64 - 69)  BP: 107/57 (11 May 2023 05:10) (107/57 - 127/61)  BP(mean): --  RR: 18 (11 May 2023 05:10) (18 - 18)  SpO2: 97% (11 May 2023 05:10) (95% - 100%)    Parameters below as of 11 May 2023 05:10  Patient On (Oxygen Delivery Method): room air           Eyes:LORNE, EOMI  Ear/Nose/Throat: no oral lesion, no sinus tenderness on percussion	  Neck:no JVD, no lymphadenopathy, supple  Respiratory: CTA kay  Cardiovascular: S1S2 RRR, no murmurs  Gastrointestinal:soft, (+) BS, no HSM  Extremities:no e/e/c        LABS:                        12.2   6.41  )-----------( 253      ( 11 May 2023 06:24 )             37.7     05-11    138  |  100  |  18  ----------------------------<  108<H>  3.5   |  24  |  1.07    Ca    9.4      11 May 2023 06:24  Phos  4.3     05-10  Mg     2.1     05-10    TPro  7.6  /  Alb  4.4  /  TBili  0.6  /  DBili  x   /  AST  18  /  ALT  23  /  AlkPhos  49  05-10    PT/INR - ( 10 May 2023 01:13 )   PT: 15.1 sec;   INR: 1.30 ratio         PTT - ( 10 May 2023 01:13 )  PTT:33.6 sec  Urinalysis Basic - ( 10 May 2023 02:23 )    Color: Yellow / Appearance: Clear / S.026 / pH: x  Gluc: x / Ketone: Negative  / Bili: Negative / Urobili: Negative   Blood: x / Protein: Trace / Nitrite: Negative   Leuk Esterase: Negative / RBC: 1 /hpf / WBC 1 /HPF   Sq Epi: x / Non Sq Epi: x / Bacteria: Negative          MICROBIOLOGY:    RECENT CULTURES:  05-10 @ 01:13 .Blood Blood                No growth to date.          RESPIRATORY CULTURES:              RADIOLOGY & ADDITIONAL STUDIES:        Pager [phone]  After 5 pm/weekends or if no response :[phone]

## 2023-05-12 ENCOUNTER — RESULT REVIEW (OUTPATIENT)
Age: 38
End: 2023-05-12

## 2023-05-12 ENCOUNTER — APPOINTMENT (OUTPATIENT)
Dept: CARDIOTHORACIC SURGERY | Facility: HOSPITAL | Age: 38
End: 2023-05-12

## 2023-05-12 LAB
ALBUMIN SERPL ELPH-MCNC: 4.3 G/DL — SIGNIFICANT CHANGE UP (ref 3.3–5)
ALP SERPL-CCNC: 58 U/L — SIGNIFICANT CHANGE UP (ref 40–120)
ALT FLD-CCNC: 21 U/L — SIGNIFICANT CHANGE UP (ref 10–45)
ANION GAP SERPL CALC-SCNC: 13 MMOL/L — SIGNIFICANT CHANGE UP (ref 5–17)
APTT BLD: 32.9 SEC — SIGNIFICANT CHANGE UP (ref 27.5–35.5)
AST SERPL-CCNC: 19 U/L — SIGNIFICANT CHANGE UP (ref 10–40)
BASE EXCESS BLDV CALC-SCNC: -0.5 MMOL/L — SIGNIFICANT CHANGE UP (ref -2–3)
BASE EXCESS BLDV CALC-SCNC: -1.8 MMOL/L — SIGNIFICANT CHANGE UP (ref -2–3)
BASE EXCESS BLDV CALC-SCNC: -5.1 MMOL/L — LOW (ref -2–3)
BASE EXCESS BLDV CALC-SCNC: 0.1 MMOL/L — SIGNIFICANT CHANGE UP (ref -2–3)
BASE EXCESS BLDV CALC-SCNC: 0.5 MMOL/L — SIGNIFICANT CHANGE UP (ref -2–3)
BASE EXCESS BLDV CALC-SCNC: 1.5 MMOL/L — SIGNIFICANT CHANGE UP (ref -2–3)
BASE EXCESS BLDV CALC-SCNC: 2.3 MMOL/L — SIGNIFICANT CHANGE UP (ref -2–3)
BILIRUB SERPL-MCNC: 0.3 MG/DL — SIGNIFICANT CHANGE UP (ref 0.2–1.2)
BLOOD GAS VENOUS - CREATININE: SIGNIFICANT CHANGE UP MG/DL (ref 0.5–1.3)
BUN SERPL-MCNC: 22 MG/DL — SIGNIFICANT CHANGE UP (ref 7–23)
CA-I SERPL-SCNC: 0.91 MMOL/L — LOW (ref 1.15–1.33)
CA-I SERPL-SCNC: 0.92 MMOL/L — LOW (ref 1.15–1.33)
CA-I SERPL-SCNC: 0.98 MMOL/L — LOW (ref 1.15–1.33)
CA-I SERPL-SCNC: 1 MMOL/L — LOW (ref 1.15–1.33)
CA-I SERPL-SCNC: 1.02 MMOL/L — LOW (ref 1.15–1.33)
CA-I SERPL-SCNC: 1.17 MMOL/L — SIGNIFICANT CHANGE UP (ref 1.15–1.33)
CA-I SERPL-SCNC: 1.22 MMOL/L — SIGNIFICANT CHANGE UP (ref 1.15–1.33)
CALCIUM SERPL-MCNC: 9.4 MG/DL — SIGNIFICANT CHANGE UP (ref 8.4–10.5)
CHLORIDE BLDV-SCNC: 101 MMOL/L — SIGNIFICANT CHANGE UP (ref 96–108)
CHLORIDE BLDV-SCNC: 102 MMOL/L — SIGNIFICANT CHANGE UP (ref 96–108)
CHLORIDE BLDV-SCNC: 103 MMOL/L — SIGNIFICANT CHANGE UP (ref 96–108)
CHLORIDE BLDV-SCNC: 105 MMOL/L — SIGNIFICANT CHANGE UP (ref 96–108)
CHLORIDE BLDV-SCNC: 106 MMOL/L — SIGNIFICANT CHANGE UP (ref 96–108)
CHLORIDE SERPL-SCNC: 105 MMOL/L — SIGNIFICANT CHANGE UP (ref 96–108)
CO2 BLDV-SCNC: 22 MMOL/L — SIGNIFICANT CHANGE UP (ref 22–26)
CO2 BLDV-SCNC: 27 MMOL/L — HIGH (ref 22–26)
CO2 BLDV-SCNC: 28 MMOL/L — HIGH (ref 22–26)
CO2 BLDV-SCNC: 28 MMOL/L — HIGH (ref 22–26)
CO2 BLDV-SCNC: 29 MMOL/L — HIGH (ref 22–26)
CO2 SERPL-SCNC: 23 MMOL/L — SIGNIFICANT CHANGE UP (ref 22–31)
CREAT SERPL-MCNC: 1.16 MG/DL — SIGNIFICANT CHANGE UP (ref 0.5–1.3)
EGFR: 83 ML/MIN/1.73M2 — SIGNIFICANT CHANGE UP
GAS PNL BLDA: SIGNIFICANT CHANGE UP
GAS PNL BLDV: 135 MMOL/L — LOW (ref 136–145)
GAS PNL BLDV: 137 MMOL/L — SIGNIFICANT CHANGE UP (ref 136–145)
GAS PNL BLDV: 138 MMOL/L — SIGNIFICANT CHANGE UP (ref 136–145)
GAS PNL BLDV: 139 MMOL/L — SIGNIFICANT CHANGE UP (ref 136–145)
GAS PNL BLDV: SIGNIFICANT CHANGE UP
GLUCOSE BLDC GLUCOMTR-MCNC: 120 MG/DL — HIGH (ref 70–99)
GLUCOSE BLDC GLUCOMTR-MCNC: 123 MG/DL — HIGH (ref 70–99)
GLUCOSE BLDC GLUCOMTR-MCNC: 129 MG/DL — HIGH (ref 70–99)
GLUCOSE BLDC GLUCOMTR-MCNC: 131 MG/DL — HIGH (ref 70–99)
GLUCOSE BLDC GLUCOMTR-MCNC: 132 MG/DL — HIGH (ref 70–99)
GLUCOSE BLDC GLUCOMTR-MCNC: 137 MG/DL — HIGH (ref 70–99)
GLUCOSE BLDC GLUCOMTR-MCNC: 140 MG/DL — HIGH (ref 70–99)
GLUCOSE BLDC GLUCOMTR-MCNC: 143 MG/DL — HIGH (ref 70–99)
GLUCOSE BLDC GLUCOMTR-MCNC: 144 MG/DL — HIGH (ref 70–99)
GLUCOSE BLDC GLUCOMTR-MCNC: 151 MG/DL — HIGH (ref 70–99)
GLUCOSE BLDC GLUCOMTR-MCNC: 153 MG/DL — HIGH (ref 70–99)
GLUCOSE BLDV-MCNC: 111 MG/DL — HIGH (ref 70–99)
GLUCOSE BLDV-MCNC: 117 MG/DL — HIGH (ref 70–99)
GLUCOSE BLDV-MCNC: 118 MG/DL — HIGH (ref 70–99)
GLUCOSE BLDV-MCNC: 144 MG/DL — HIGH (ref 70–99)
GLUCOSE BLDV-MCNC: 197 MG/DL — HIGH (ref 70–99)
GLUCOSE BLDV-MCNC: 229 MG/DL — HIGH (ref 70–99)
GLUCOSE BLDV-MCNC: 240 MG/DL — HIGH (ref 70–99)
GLUCOSE SERPL-MCNC: 93 MG/DL — SIGNIFICANT CHANGE UP (ref 70–99)
HCO3 BLDV-SCNC: 21 MMOL/L — LOW (ref 22–29)
HCO3 BLDV-SCNC: 25 MMOL/L — SIGNIFICANT CHANGE UP (ref 22–29)
HCO3 BLDV-SCNC: 25 MMOL/L — SIGNIFICANT CHANGE UP (ref 22–29)
HCO3 BLDV-SCNC: 26 MMOL/L — SIGNIFICANT CHANGE UP (ref 22–29)
HCO3 BLDV-SCNC: 26 MMOL/L — SIGNIFICANT CHANGE UP (ref 22–29)
HCO3 BLDV-SCNC: 27 MMOL/L — SIGNIFICANT CHANGE UP (ref 22–29)
HCO3 BLDV-SCNC: 27 MMOL/L — SIGNIFICANT CHANGE UP (ref 22–29)
HCT VFR BLDA CALC: 26 % — LOW (ref 39–51)
HCT VFR BLDA CALC: 27 % — LOW (ref 39–51)
HCT VFR BLDA CALC: 28 % — LOW (ref 39–51)
HCT VFR BLDA CALC: 31 % — LOW (ref 39–51)
HCT VFR BLDA CALC: 31 % — LOW (ref 39–51)
HCT VFR BLDA CALC: 33 % — LOW (ref 39–51)
HCT VFR BLDA CALC: 36 % — LOW (ref 39–51)
HEPARINASE TEG R TIME: 9.7 MIN (ref 4.3–8.3)
HGB BLD CALC-MCNC: 10.2 G/DL — LOW (ref 12.6–17.4)
HGB BLD CALC-MCNC: 10.3 G/DL — LOW (ref 12.6–17.4)
HGB BLD CALC-MCNC: 11.1 G/DL — LOW (ref 12.6–17.4)
HGB BLD CALC-MCNC: 11.9 G/DL — LOW (ref 12.6–17.4)
HGB BLD CALC-MCNC: 8.7 G/DL — LOW (ref 12.6–17.4)
HGB BLD CALC-MCNC: 9 G/DL — LOW (ref 12.6–17.4)
HGB BLD CALC-MCNC: 9.3 G/DL — LOW (ref 12.6–17.4)
HOROWITZ INDEX BLDV+IHG-RTO: 36 — SIGNIFICANT CHANGE UP
INR BLD: 1.2 RATIO — HIGH (ref 0.88–1.16)
LACTATE BLDV-MCNC: 0.7 MMOL/L — SIGNIFICANT CHANGE UP (ref 0.5–2)
LACTATE BLDV-MCNC: 0.8 MMOL/L — SIGNIFICANT CHANGE UP (ref 0.5–2)
LACTATE BLDV-MCNC: 1.2 MMOL/L — SIGNIFICANT CHANGE UP (ref 0.5–2)
LACTATE BLDV-MCNC: 1.5 MMOL/L — SIGNIFICANT CHANGE UP (ref 0.5–2)
LACTATE BLDV-MCNC: 2 MMOL/L — SIGNIFICANT CHANGE UP (ref 0.5–2)
LACTATE BLDV-MCNC: 3.2 MMOL/L — HIGH (ref 0.5–2)
LACTATE BLDV-MCNC: 3.3 MMOL/L — HIGH (ref 0.5–2)
LACTATE SERPL-SCNC: 6.1 MMOL/L — CRITICAL HIGH (ref 0.5–2)
MAGNESIUM SERPL-MCNC: 2.1 MG/DL — SIGNIFICANT CHANGE UP (ref 1.6–2.6)
PCO2 BLDV: 39 MMHG — LOW (ref 42–55)
PCO2 BLDV: 40 MMHG — LOW (ref 42–55)
PCO2 BLDV: 43 MMHG — SIGNIFICANT CHANGE UP (ref 42–55)
PCO2 BLDV: 46 MMHG — SIGNIFICANT CHANGE UP (ref 42–55)
PCO2 BLDV: 48 MMHG — SIGNIFICANT CHANGE UP (ref 42–55)
PCO2 BLDV: 52 MMHG — SIGNIFICANT CHANGE UP (ref 42–55)
PCO2 BLDV: 52 MMHG — SIGNIFICANT CHANGE UP (ref 42–55)
PH BLDV: 7.29 — LOW (ref 7.32–7.43)
PH BLDV: 7.32 — SIGNIFICANT CHANGE UP (ref 7.32–7.43)
PH BLDV: 7.32 — SIGNIFICANT CHANGE UP (ref 7.32–7.43)
PH BLDV: 7.35 — SIGNIFICANT CHANGE UP (ref 7.32–7.43)
PH BLDV: 7.35 — SIGNIFICANT CHANGE UP (ref 7.32–7.43)
PH BLDV: 7.41 — SIGNIFICANT CHANGE UP (ref 7.32–7.43)
PH BLDV: 7.43 — SIGNIFICANT CHANGE UP (ref 7.32–7.43)
PHOSPHATE SERPL-MCNC: 4.7 MG/DL — HIGH (ref 2.5–4.5)
PO2 BLDV: 45 MMHG — SIGNIFICANT CHANGE UP (ref 25–45)
PO2 BLDV: 54 MMHG — HIGH (ref 25–45)
PO2 BLDV: 56 MMHG — HIGH (ref 25–45)
PO2 BLDV: 61 MMHG — HIGH (ref 25–45)
PO2 BLDV: 61 MMHG — HIGH (ref 25–45)
PO2 BLDV: 64 MMHG — HIGH (ref 25–45)
PO2 BLDV: 74 MMHG — HIGH (ref 25–45)
POTASSIUM BLDV-SCNC: 3.7 MMOL/L — SIGNIFICANT CHANGE UP (ref 3.5–5.1)
POTASSIUM BLDV-SCNC: 3.8 MMOL/L — SIGNIFICANT CHANGE UP (ref 3.5–5.1)
POTASSIUM BLDV-SCNC: 4.2 MMOL/L — SIGNIFICANT CHANGE UP (ref 3.5–5.1)
POTASSIUM BLDV-SCNC: 4.4 MMOL/L — SIGNIFICANT CHANGE UP (ref 3.5–5.1)
POTASSIUM BLDV-SCNC: 4.8 MMOL/L — SIGNIFICANT CHANGE UP (ref 3.5–5.1)
POTASSIUM SERPL-MCNC: 3.7 MMOL/L — SIGNIFICANT CHANGE UP (ref 3.5–5.3)
POTASSIUM SERPL-SCNC: 3.7 MMOL/L — SIGNIFICANT CHANGE UP (ref 3.5–5.3)
PROT SERPL-MCNC: 7.7 G/DL — SIGNIFICANT CHANGE UP (ref 6–8.3)
PROTHROM AB SERPL-ACNC: 13.9 SEC — HIGH (ref 10.5–13.4)
RAPIDTEG MAXIMUM AMPLITUDE: 61.6 MM — SIGNIFICANT CHANGE UP (ref 52–70)
SAO2 % BLDV: 72.8 % — SIGNIFICANT CHANGE UP (ref 67–88)
SAO2 % BLDV: 82.7 % — SIGNIFICANT CHANGE UP (ref 67–88)
SAO2 % BLDV: 91.2 % — HIGH (ref 67–88)
SAO2 % BLDV: 91.6 % — HIGH (ref 67–88)
SAO2 % BLDV: 94.6 % — HIGH (ref 67–88)
SAO2 % BLDV: 96.2 % — HIGH (ref 67–88)
SAO2 % BLDV: 97.3 % — HIGH (ref 67–88)
SODIUM SERPL-SCNC: 141 MMOL/L — SIGNIFICANT CHANGE UP (ref 135–145)
TEG FUNCTIONAL FIBRINOGEN: 21.6 MM — SIGNIFICANT CHANGE UP (ref 15–32)
TEG MAXIMUM AMPLITUDE: 60.1 MM — SIGNIFICANT CHANGE UP (ref 52–69)
TEG REACTION TIME: 8.3 MIN — SIGNIFICANT CHANGE UP (ref 4.6–9.1)

## 2023-05-12 PROCEDURE — 33405 REPLACEMENT AORTIC VALVE OPN: CPT

## 2023-05-12 PROCEDURE — 71045 X-RAY EXAM CHEST 1 VIEW: CPT | Mod: 26

## 2023-05-12 PROCEDURE — 93010 ELECTROCARDIOGRAM REPORT: CPT

## 2023-05-12 PROCEDURE — 88305 TISSUE EXAM BY PATHOLOGIST: CPT | Mod: 26

## 2023-05-12 PROCEDURE — 99291 CRITICAL CARE FIRST HOUR: CPT

## 2023-05-12 PROCEDURE — 33268 EXCL LAA OPN OTH PX ANY METH: CPT

## 2023-05-12 DEVICE — CANNULA RCSP 15FR X 12.5" AUTO-INFLATE CUFF WITH SOLID STYLET: Type: IMPLANTABLE DEVICE | Status: FUNCTIONAL

## 2023-05-12 DEVICE — PACING WIRE WHITE M-24 BAREWIRE 37MM X 89MM: Type: IMPLANTABLE DEVICE | Status: FUNCTIONAL

## 2023-05-12 DEVICE — PACING WIRE WHITE M-22 LOOP 89MM: Type: IMPLANTABLE DEVICE | Status: FUNCTIONAL

## 2023-05-12 DEVICE — MEDIASTINAL CATH DRAIN 9MM: Type: IMPLANTABLE DEVICE | Status: FUNCTIONAL

## 2023-05-12 DEVICE — CANNULA CORONARY SILICONE OSTIAL 20FR: Type: IMPLANTABLE DEVICE | Status: FUNCTIONAL

## 2023-05-12 DEVICE — CANNULA AORTIC ROOT WITH VENT LINE 9G X 13.3CM FLANGED PRESSURE MONITORING: Type: IMPLANTABLE DEVICE | Status: FUNCTIONAL

## 2023-05-12 DEVICE — CATH VENT VENTRICULAR PVC 18FR X 4.25" TIP PERFORATION: Type: IMPLANTABLE DEVICE | Status: FUNCTIONAL

## 2023-05-12 DEVICE — LIGATING CLIPS WECK HORIZON SMALL-WIDE (RED) 24: Type: IMPLANTABLE DEVICE | Status: FUNCTIONAL

## 2023-05-12 DEVICE — CHEST DRAIN THORACIC ARGYLE PVC 32FR RIGHT ANGLE: Type: IMPLANTABLE DEVICE | Status: FUNCTIONAL

## 2023-05-12 DEVICE — CANNULA VENOUS 1 STAGE RIGHT ANGLE METAL TIP 28FR X 3/8": Type: IMPLANTABLE DEVICE | Status: FUNCTIONAL

## 2023-05-12 DEVICE — CANNULA VENOUS 2 STAGE THIN FLEX 36/46FR X 1/2" WITH RETURN DIAL: Type: IMPLANTABLE DEVICE | Status: FUNCTIONAL

## 2023-05-12 DEVICE — KIT A-LINE 1LUM 20G X 12CM SAFE KIT: Type: IMPLANTABLE DEVICE | Status: FUNCTIONAL

## 2023-05-12 DEVICE — CANNULA VENOUS 1 STAGE RIGHT ANGLE METAL TIP 31FR X 3/8" EXTENDED TIP: Type: IMPLANTABLE DEVICE | Status: FUNCTIONAL

## 2023-05-12 DEVICE — ATRICLIP LAA EXCLUSION DEVICE 40MM FLEX-V: Type: IMPLANTABLE DEVICE | Status: FUNCTIONAL

## 2023-05-12 DEVICE — LIGATING CLIPS WECK HORIZON MEDIUM (BLUE) 24: Type: IMPLANTABLE DEVICE | Status: FUNCTIONAL

## 2023-05-12 DEVICE — KIT CVC 2LUM MAC 9FR CHG: Type: IMPLANTABLE DEVICE | Status: FUNCTIONAL

## 2023-05-12 DEVICE — CANNULA AORTIC PERFUSION EZ GLIDE STRAIGHT 21FR X 35CM NON-VENTED: Type: IMPLANTABLE DEVICE | Status: FUNCTIONAL

## 2023-05-12 DEVICE — IMPLANTABLE DEVICE: Type: IMPLANTABLE DEVICE | Status: FUNCTIONAL

## 2023-05-12 RX ORDER — NALBUPHINE HYDROCHLORIDE 10 MG/ML
2.5 INJECTION, SOLUTION INTRAMUSCULAR; INTRAVENOUS; SUBCUTANEOUS EVERY 6 HOURS
Refills: 0 | Status: DISCONTINUED | OUTPATIENT
Start: 2023-05-12 | End: 2023-05-17

## 2023-05-12 RX ORDER — AMIODARONE HYDROCHLORIDE 400 MG/1
400 TABLET ORAL
Refills: 0 | Status: COMPLETED | OUTPATIENT
Start: 2023-05-12 | End: 2023-05-15

## 2023-05-12 RX ORDER — MORPHINE SULFATE 50 MG/1
0.5 CAPSULE, EXTENDED RELEASE ORAL ONCE
Refills: 0 | Status: DISCONTINUED | OUTPATIENT
Start: 2023-05-12 | End: 2023-05-12

## 2023-05-12 RX ORDER — SODIUM CHLORIDE 9 MG/ML
500 INJECTION, SOLUTION INTRAVENOUS ONCE
Refills: 0 | Status: COMPLETED | OUTPATIENT
Start: 2023-05-12 | End: 2023-05-12

## 2023-05-12 RX ORDER — POLYETHYLENE GLYCOL 3350 17 G/17G
17 POWDER, FOR SOLUTION ORAL DAILY
Refills: 0 | Status: DISCONTINUED | OUTPATIENT
Start: 2023-05-13 | End: 2023-05-18

## 2023-05-12 RX ORDER — PANTOPRAZOLE SODIUM 20 MG/1
40 TABLET, DELAYED RELEASE ORAL DAILY
Refills: 0 | Status: DISCONTINUED | OUTPATIENT
Start: 2023-05-12 | End: 2023-05-14

## 2023-05-12 RX ORDER — OXYCODONE HYDROCHLORIDE 5 MG/1
10 TABLET ORAL EVERY 4 HOURS
Refills: 0 | Status: DISCONTINUED | OUTPATIENT
Start: 2023-05-12 | End: 2023-05-12

## 2023-05-12 RX ORDER — MEPERIDINE HYDROCHLORIDE 50 MG/ML
25 INJECTION INTRAMUSCULAR; INTRAVENOUS; SUBCUTANEOUS ONCE
Refills: 0 | Status: DISCONTINUED | OUTPATIENT
Start: 2023-05-12 | End: 2023-05-12

## 2023-05-12 RX ORDER — POTASSIUM CHLORIDE 20 MEQ
10 PACKET (EA) ORAL
Refills: 0 | Status: DISCONTINUED | OUTPATIENT
Start: 2023-05-12 | End: 2023-05-12

## 2023-05-12 RX ORDER — NOREPINEPHRINE BITARTRATE/D5W 8 MG/250ML
0.05 PLASTIC BAG, INJECTION (ML) INTRAVENOUS
Qty: 8 | Refills: 0 | Status: DISCONTINUED | OUTPATIENT
Start: 2023-05-12 | End: 2023-05-13

## 2023-05-12 RX ORDER — DEXTROSE 50 % IN WATER 50 %
50 SYRINGE (ML) INTRAVENOUS
Refills: 0 | Status: DISCONTINUED | OUTPATIENT
Start: 2023-05-12 | End: 2023-05-15

## 2023-05-12 RX ORDER — NICARDIPINE HYDROCHLORIDE 30 MG/1
2.5 CAPSULE, EXTENDED RELEASE ORAL
Qty: 40 | Refills: 0 | Status: DISCONTINUED | OUTPATIENT
Start: 2023-05-12 | End: 2023-05-12

## 2023-05-12 RX ORDER — ACETAMINOPHEN 500 MG
650 TABLET ORAL EVERY 6 HOURS
Refills: 0 | Status: DISCONTINUED | OUTPATIENT
Start: 2023-05-15 | End: 2023-05-18

## 2023-05-12 RX ORDER — SODIUM CHLORIDE 9 MG/ML
1000 INJECTION, SOLUTION INTRAVENOUS
Refills: 0 | Status: DISCONTINUED | OUTPATIENT
Start: 2023-05-12 | End: 2023-05-12

## 2023-05-12 RX ORDER — ACETAMINOPHEN 500 MG
650 TABLET ORAL EVERY 6 HOURS
Refills: 0 | Status: COMPLETED | OUTPATIENT
Start: 2023-05-12 | End: 2023-05-15

## 2023-05-12 RX ORDER — SENNA PLUS 8.6 MG/1
2 TABLET ORAL AT BEDTIME
Refills: 0 | Status: DISCONTINUED | OUTPATIENT
Start: 2023-05-13 | End: 2023-05-18

## 2023-05-12 RX ORDER — NALOXONE HYDROCHLORIDE 4 MG/.1ML
0.1 SPRAY NASAL
Refills: 0 | Status: DISCONTINUED | OUTPATIENT
Start: 2023-05-12 | End: 2023-05-18

## 2023-05-12 RX ORDER — ASPIRIN/CALCIUM CARB/MAGNESIUM 324 MG
325 TABLET ORAL DAILY
Refills: 0 | Status: DISCONTINUED | OUTPATIENT
Start: 2023-05-12 | End: 2023-05-14

## 2023-05-12 RX ORDER — ONDANSETRON 8 MG/1
4 TABLET, FILM COATED ORAL EVERY 6 HOURS
Refills: 0 | Status: DISCONTINUED | OUTPATIENT
Start: 2023-05-12 | End: 2023-05-18

## 2023-05-12 RX ORDER — INSULIN HUMAN 100 [IU]/ML
3 INJECTION, SOLUTION SUBCUTANEOUS
Qty: 100 | Refills: 0 | Status: DISCONTINUED | OUTPATIENT
Start: 2023-05-12 | End: 2023-05-13

## 2023-05-12 RX ORDER — POTASSIUM CHLORIDE 20 MEQ
10 PACKET (EA) ORAL
Refills: 0 | Status: COMPLETED | OUTPATIENT
Start: 2023-05-12 | End: 2023-05-12

## 2023-05-12 RX ORDER — HYDROMORPHONE HYDROCHLORIDE 2 MG/ML
0.5 INJECTION INTRAMUSCULAR; INTRAVENOUS; SUBCUTANEOUS
Refills: 0 | Status: DISCONTINUED | OUTPATIENT
Start: 2023-05-12 | End: 2023-05-15

## 2023-05-12 RX ORDER — ASCORBIC ACID 60 MG
500 TABLET,CHEWABLE ORAL
Refills: 0 | Status: COMPLETED | OUTPATIENT
Start: 2023-05-12 | End: 2023-05-17

## 2023-05-12 RX ORDER — NICARDIPINE HYDROCHLORIDE 30 MG/1
5 CAPSULE, EXTENDED RELEASE ORAL
Qty: 40 | Refills: 0 | Status: DISCONTINUED | OUTPATIENT
Start: 2023-05-12 | End: 2023-05-13

## 2023-05-12 RX ORDER — DEXTROSE 50 % IN WATER 50 %
25 SYRINGE (ML) INTRAVENOUS
Refills: 0 | Status: DISCONTINUED | OUTPATIENT
Start: 2023-05-12 | End: 2023-05-12

## 2023-05-12 RX ORDER — HYDROMORPHONE HYDROCHLORIDE 2 MG/ML
0.5 INJECTION INTRAMUSCULAR; INTRAVENOUS; SUBCUTANEOUS EVERY 6 HOURS
Refills: 0 | Status: DISCONTINUED | OUTPATIENT
Start: 2023-05-12 | End: 2023-05-12

## 2023-05-12 RX ORDER — MILRINONE LACTATE 1 MG/ML
0.12 INJECTION, SOLUTION INTRAVENOUS
Qty: 20 | Refills: 0 | Status: DISCONTINUED | OUTPATIENT
Start: 2023-05-12 | End: 2023-05-13

## 2023-05-12 RX ORDER — CHLORHEXIDINE GLUCONATE 213 G/1000ML
15 SOLUTION TOPICAL EVERY 12 HOURS
Refills: 0 | Status: DISCONTINUED | OUTPATIENT
Start: 2023-05-12 | End: 2023-05-12

## 2023-05-12 RX ORDER — OXYCODONE HYDROCHLORIDE 5 MG/1
5 TABLET ORAL EVERY 4 HOURS
Refills: 0 | Status: DISCONTINUED | OUTPATIENT
Start: 2023-05-12 | End: 2023-05-12

## 2023-05-12 RX ORDER — GABAPENTIN 400 MG/1
100 CAPSULE ORAL EVERY 8 HOURS
Refills: 0 | Status: COMPLETED | OUTPATIENT
Start: 2023-05-12 | End: 2023-05-17

## 2023-05-12 RX ORDER — HYDROMORPHONE HYDROCHLORIDE 2 MG/ML
30 INJECTION INTRAMUSCULAR; INTRAVENOUS; SUBCUTANEOUS
Refills: 0 | Status: DISCONTINUED | OUTPATIENT
Start: 2023-05-12 | End: 2023-05-15

## 2023-05-12 RX ORDER — CHLORHEXIDINE GLUCONATE 213 G/1000ML
1 SOLUTION TOPICAL DAILY
Refills: 0 | Status: COMPLETED | OUTPATIENT
Start: 2023-05-12 | End: 2023-05-17

## 2023-05-12 RX ORDER — ASPIRIN/CALCIUM CARB/MAGNESIUM 324 MG
300 TABLET ORAL ONCE
Refills: 0 | Status: DISCONTINUED | OUTPATIENT
Start: 2023-05-12 | End: 2023-05-12

## 2023-05-12 RX ORDER — SODIUM CHLORIDE 9 MG/ML
1000 INJECTION INTRAMUSCULAR; INTRAVENOUS; SUBCUTANEOUS
Refills: 0 | Status: DISCONTINUED | OUTPATIENT
Start: 2023-05-12 | End: 2023-05-14

## 2023-05-12 RX ORDER — WARFARIN SODIUM 2.5 MG/1
5 TABLET ORAL ONCE
Refills: 0 | Status: COMPLETED | OUTPATIENT
Start: 2023-05-12 | End: 2023-05-12

## 2023-05-12 RX ORDER — CEFUROXIME AXETIL 250 MG
1500 TABLET ORAL EVERY 8 HOURS
Refills: 0 | Status: COMPLETED | OUTPATIENT
Start: 2023-05-12 | End: 2023-05-14

## 2023-05-12 RX ADMIN — Medication 650 MILLIGRAM(S): at 23:50

## 2023-05-12 RX ADMIN — PANTOPRAZOLE SODIUM 40 MILLIGRAM(S): 20 TABLET, DELAYED RELEASE ORAL at 18:49

## 2023-05-12 RX ADMIN — INSULIN HUMAN 3 UNIT(S)/HR: 100 INJECTION, SOLUTION SUBCUTANEOUS at 17:03

## 2023-05-12 RX ADMIN — HYDROMORPHONE HYDROCHLORIDE 0.5 MILLIGRAM(S): 2 INJECTION INTRAMUSCULAR; INTRAVENOUS; SUBCUTANEOUS at 16:18

## 2023-05-12 RX ADMIN — HYDROMORPHONE HYDROCHLORIDE 0.5 MILLIGRAM(S): 2 INJECTION INTRAMUSCULAR; INTRAVENOUS; SUBCUTANEOUS at 16:40

## 2023-05-12 RX ADMIN — Medication 100 MILLIEQUIVALENT(S): at 16:10

## 2023-05-12 RX ADMIN — AMIODARONE HYDROCHLORIDE 400 MILLIGRAM(S): 400 TABLET ORAL at 17:15

## 2023-05-12 RX ADMIN — Medication 100 MILLIEQUIVALENT(S): at 15:30

## 2023-05-12 RX ADMIN — SODIUM CHLORIDE 3 MILLILITER(S): 9 INJECTION INTRAMUSCULAR; INTRAVENOUS; SUBCUTANEOUS at 05:41

## 2023-05-12 RX ADMIN — Medication 100 MILLIEQUIVALENT(S): at 15:44

## 2023-05-12 RX ADMIN — Medication 100 MILLIGRAM(S): at 21:18

## 2023-05-12 RX ADMIN — CHLORHEXIDINE GLUCONATE 15 MILLILITER(S): 213 SOLUTION TOPICAL at 17:15

## 2023-05-12 RX ADMIN — GABAPENTIN 100 MILLIGRAM(S): 400 CAPSULE ORAL at 21:22

## 2023-05-12 RX ADMIN — HYDROMORPHONE HYDROCHLORIDE 30 MILLILITER(S): 2 INJECTION INTRAMUSCULAR; INTRAVENOUS; SUBCUTANEOUS at 19:32

## 2023-05-12 RX ADMIN — WARFARIN SODIUM 5 MILLIGRAM(S): 2.5 TABLET ORAL at 21:21

## 2023-05-12 RX ADMIN — Medication 500 MILLIGRAM(S): at 17:15

## 2023-05-12 RX ADMIN — Medication 325 MILLIGRAM(S): at 19:49

## 2023-05-12 RX ADMIN — NICARDIPINE HYDROCHLORIDE 12.5 MG/HR: 30 CAPSULE, EXTENDED RELEASE ORAL at 17:03

## 2023-05-12 RX ADMIN — SODIUM CHLORIDE 500 MILLILITER(S): 9 INJECTION, SOLUTION INTRAVENOUS at 16:38

## 2023-05-12 NOTE — PROGRESS NOTE ADULT - SUBJECTIVE AND OBJECTIVE BOX
Patient seen and examined at the bedside.    Remained critically ill on continuous ICU monitoring.    OBJECTIVE:  Vital Signs Last 24 Hrs  T(C): 36.5 (12 May 2023 16:00), Max: 36.9 (11 May 2023 18:13)  T(F): 97.7 (12 May 2023 16:00), Max: 98.5 (11 May 2023 19:03)  HR: 90 (12 May 2023 17:00) (60 - 98)  BP: 120/59 (12 May 2023 05:11) (93/54 - 143/69)  BP(mean): --  RR: 22 (12 May 2023 17:00) (16 - 34)  SpO2: 99% (12 May 2023 17:00) (95% - 100%)    Parameters below as of 12 May 2023 16:00  Patient On (Oxygen Delivery Method): nasal cannula    O2 Concentration (%): 4    REVIEW OF SYSTEMS:      Physical Exam:  General: Extubated  Neurology: nonfocal  Eyes: bilateral pupils equal and reactive   ENT/Neck: Neck supple, trachea midline, No JVD   Respiratory: Rales noted bilaterally   CV: RRR, S1S2, no murmurs        [x] Sternal dressing, [x] Mediastinal CT x2,         [x] Sinus rhythm,  Abdominal: Soft, NT, ND +BS,   Extremities: 1-2+ pedal edema noted, + peripheral pulses   Skin: No Rashes, Hematoma, Ecchymosis                           Assessment:    Plan:   ***Neuro***  [x] Nonfocal   Sedation weaned off  Post operative neuro assessment   Tylenol, Gabapentin, Meperidine, Morphine, Nalbuphine, PRN Oxycodone, and PRN Dilaudid for pain management.     ***Cardiovascular***  Invasive hemodynamic monitoring, assess perfusion indices   SR / CVP 4/ MAP 76/SvO2 ___ / Hct 37.7%/ Lactate 7.1  [x] Primacor 0.375 mcg/kg/min  [x] Cardene 2.5 mg/hr  Volume: [x] LR 1L   Reassessment of hemodynamics post resuscitation   Monitor chest tube outputs   [x] Bleeding   [x] ASA  Coumadin dosing tonight for INR of 1.20  Serial EKG and cardiac enzymes     ***Pulmonary***  [x] 4L NC   Encourage incentive spirometry, continue pulse ox monitoring, follow ABGs                 ***GI***  [x] NPO    [x] Protonix    ***Renal***  GFR f/u  Continue to monitor I/Os, BUN/Creatinine.   Replete lytes PRN  aAshish present    ***ID***  Perioperative antibiotics     ***Endocrine***  [x] DM2: HbA1c 5.3%              - [x] Insulin gtt              - Need tight glycemic control to prevent wound infection.      Patient requires continuous monitoring with bedside rhythm monitoring, pulse oximetry monitoring, and continuous central venous and arterial pressure monitoring; and intermittent blood gas analysis. Care plan discussed with the ICU care team.   Patient remained critical, at risk for life threatening decompensation.    I have spent 40 minutes providing critical care management to this patient.    By signing my name below, I, Mike Diaz, attest that this documentation has been prepared under the direction and in the presence of Raquel Berkowitz MD   Electronically signed: Terll Young, 05-12-23 @ 17:27    I, Raquel Berkowitz, personally performed the services described in this documentation. all medical record entries made by the scribe were at my direction and in my presence. I have reviewed the chart and agree that the record reflects my personal performance and is accurate and complete  Electronically signed: Raquel Berkowitz MD  Patient seen and examined at the bedside.    Remained critically ill on continuous ICU monitoring.    OBJECTIVE:  Vital Signs Last 24 Hrs  T(C): 36.5 (12 May 2023 16:00), Max: 36.9 (11 May 2023 18:13)  T(F): 97.7 (12 May 2023 16:00), Max: 98.5 (11 May 2023 19:03)  HR: 90 (12 May 2023 17:00) (60 - 98)  BP: 120/59 (12 May 2023 05:11) (93/54 - 143/69)  BP(mean): --  RR: 22 (12 May 2023 17:00) (16 - 34)  SpO2: 99% (12 May 2023 17:00) (95% - 100%)    Parameters below as of 12 May 2023 16:00  Patient On (Oxygen Delivery Method): nasal cannula    O2 Concentration (%): 4    REVIEW OF SYSTEMS:      Physical Exam:  General: Extubated  Neurology: nonfocal  Eyes: bilateral pupils equal and reactive   ENT/Neck: Neck supple, trachea midline, No JVD   Respiratory: Rales noted bilaterally   CV: RRR, S1S2, no murmurs        [x] Sternal dressing, [x] Mediastinal CT x2,         [x] Sinus rhythm,  Abdominal: Soft, NT, ND +BS,   Extremities: 1-2+ pedal edema noted, + peripheral pulses   Skin: No Rashes, Hematoma, Ecchymosis                           Assessment:    Plan:   ***Neuro***  [x] Nonfocal   Sedation weaned off  Post operative neuro assessment   Tylenol, Gabapentin, Meperidine, Morphine, Nalbuphine, PRN Oxycodone, and PRN Dilaudid for pain management.     ***Cardiovascular***  Invasive hemodynamic monitoring, assess perfusion indices   SR / CVP 4/ MAP 76/SvO2 ___ / Hct 37.7%/ Lactate 7.1  [x] Primacor 0.125 mcg/kg/min, wean as tolerated   [x] Cardene 4 mg/hr  Volume: [x] LR 1L   Reassessment of hemodynamics post resuscitation   Monitor chest tube outputs   [x] Bleeding   [x] ASA  Start Coumadin dosing tonight for INR of 1.20  Serial EKG and cardiac enzymes     ***Pulmonary***  [x] 4L NC   Encourage incentive spirometry, continue pulse ox monitoring, follow ABGs                 ***GI***  [x] NPO    [x] Protonix    ***Renal***  GFR f/u  Continue to monitor I/Os, BUN/Creatinine.   Replete lytes PRN  Aashish present    ***ID***  Perioperative antibiotics     ***Endocrine***  [x] DM2: HbA1c 5.3%              - [x] Insulin gtt              - Need tight glycemic control to prevent wound infection.      Patient requires continuous monitoring with bedside rhythm monitoring, pulse oximetry monitoring, and continuous central venous and arterial pressure monitoring; and intermittent blood gas analysis. Care plan discussed with the ICU care team.   Patient remained critical, at risk for life threatening decompensation.    I have spent 40 minutes providing critical care management to this patient.    By signing my name below, I, Mike Diaz, attest that this documentation has been prepared under the direction and in the presence of Raquel Berkowitz MD   Electronically signed: Trell Young, 05-12-23 @ 17:27    I, Raquel Berkowitz, personally performed the services described in this documentation. all medical record entries made by the scribe were at my direction and in my presence. I have reviewed the chart and agree that the record reflects my personal performance and is accurate and complete  Electronically signed: Raquel Berkowitz MD  Patient seen and examined at the bedside.    Remained critically ill on continuous ICU monitoring.    OBJECTIVE:  Vital Signs Last 24 Hrs  T(C): 36.5 (12 May 2023 16:00), Max: 36.9 (11 May 2023 18:13)  T(F): 97.7 (12 May 2023 16:00), Max: 98.5 (11 May 2023 19:03)  HR: 90 (12 May 2023 17:00) (60 - 98)  BP: 120/59 (12 May 2023 05:11) (93/54 - 143/69)  BP(mean): --  RR: 22 (12 May 2023 17:00) (16 - 34)  SpO2: 99% (12 May 2023 17:00) (95% - 100%)    Parameters below as of 12 May 2023 16:00  Patient On (Oxygen Delivery Method): nasal cannula    O2 Concentration (%): 4    REVIEW OF SYSTEMS:  + incisional pain     Physical Exam:  General: Extubated  Neurology: nonfocal  Eyes: bilateral pupils equal and reactive   ENT/Neck: Neck supple, trachea midline, No JVD   Respiratory: Rales noted bilaterally   CV: RRR, S1S2, no murmurs        [x] Sternal dressing, [x] Mediastinal CT x2,         [x] Sinus rhythm,  Abdominal: Soft, NT, ND +BS,   Extremities: 1-2+ pedal edema noted, + peripheral pulses   Skin: No Rashes, Hematoma, Ecchymosis                           Assessment:    Plan:   ***Neuro***  [x] Nonfocal   Sedation weaned off  Post operative neuro assessment   Tylenol, Gabapentin, Meperidine, Morphine, Nalbuphine, PRN Oxycodone, and PRN Dilaudid for pain management.     ***Cardiovascular***  Invasive hemodynamic monitoring, assess perfusion indices   SR / CVP 4/ MAP 76/SvO2 ___ / Hct 37.7%/ Lactate 7.1  [x] Primacor 0.125 mcg/kg/min, wean as tolerated   [x] Cardene 4 mg/hr  Volume: [x] LR 1L   Reassessment of hemodynamics post resuscitation   Monitor chest tube outputs   [x] Bleeding   [x] ASA  Start Coumadin dosing tonight for INR of 1.20  Serial EKG and cardiac enzymes     ***Pulmonary***  [x] 4L NC   Encourage incentive spirometry, continue pulse ox monitoring, follow ABGs                 ***GI***  [x] NPO    [x] Protonix    ***Renal***  GFR f/u  Continue to monitor I/Os, BUN/Creatinine.   Replete lytes PRN  Aashish present    ***ID***  Perioperative antibiotics     ***Endocrine***  [x] DM2: HbA1c 5.3%              - [x] Insulin gtt              - Need tight glycemic control to prevent wound infection.      Patient requires continuous monitoring with bedside rhythm monitoring, pulse oximetry monitoring, and continuous central venous and arterial pressure monitoring; and intermittent blood gas analysis. Care plan discussed with the ICU care team.   Patient remained critical, at risk for life threatening decompensation.    I have spent 40 minutes providing critical care management to this patient.    By signing my name below, I, Mike Diaz, attest that this documentation has been prepared under the direction and in the presence of Raquel Berkowitz MD   Electronically signed: Trell Young, 05-12-23 @ 17:27    I, Raquel Berkowitz, personally performed the services described in this documentation. all medical record entries made by the scribe were at my direction and in my presence. I have reviewed the chart and agree that the record reflects my personal performance and is accurate and complete  Electronically signed: Raquel Berkowitz MD  Patient seen and examined at the bedside.    Remained critically ill on continuous ICU monitoring.    OBJECTIVE:  Vital Signs Last 24 Hrs  T(C): 36.5 (12 May 2023 16:00), Max: 36.9 (11 May 2023 18:13)  T(F): 97.7 (12 May 2023 16:00), Max: 98.5 (11 May 2023 19:03)  HR: 90 (12 May 2023 17:00) (60 - 98)  BP: 120/59 (12 May 2023 05:11) (93/54 - 143/69)  BP(mean): --  RR: 22 (12 May 2023 17:00) (16 - 34)  SpO2: 99% (12 May 2023 17:00) (95% - 100%)    Parameters below as of 12 May 2023 16:00  Patient On (Oxygen Delivery Method): nasal cannula    O2 Concentration (%): 4    REVIEW OF SYSTEMS:  + incisional pain     Physical Exam:  General: Extubated  Neurology: nonfocal  Eyes: bilateral pupils equal and reactive   ENT/Neck: Neck supple, trachea midline, No JVD   Respiratory: Rales noted bilaterally   CV: RRR, S1S2, no murmurs        [x] Sternal dressing, [x] Mediastinal CT x2,         [x] Sinus rhythm,  Abdominal: Soft, NT, ND +BS,   Extremities: 1-2+ pedal edema noted, + peripheral pulses   Skin: No Rashes, Hematoma, Ecchymosis                           Assessment:  37 yr male DM2 / HTN / HLD / Strep bacteremia   Severe Aortic insuffiencey  S/P AVR (M), JUANY ligation   intra op ROSARIO moderate MR /Severe AI WITH ef 65%  Post op hypovolemia, hypotension with Lactic acidosis   Labile hemodynamics with episodes of severe HTN   Post op Pain   Hyperglycemia, hypokalemia      Plan:   ***Neuro***  [x] Nonfocal     Post operative neuro assessment   Tylenol, Gabapentin, PCA pain management.     ***Cardiovascular***  Post op volume resuscitation initially HTN subsequently requiring Levophed     Invasive hemodynamic monitoring, assess perfusion indices   SR 80 / CVP 4-6 / MAP 60- 76/SvO2 74 / Hct 37.7%/ Lactate 7.1  [x] Primacor 0.125 mcg/kg/min   [x] Cardene (Dced)  [x] Levophed 0.04 mcg/kg/min   Volume: [x] LR 1000 cc    Monitor chest tube outputs   [x] Statin  [x] ASA  Start Coumadin dosing tonight for INR of 1.20  Serial EKG back up epicardial pacing ( wires isolated)    ***Pulmonary***  Extubated in OR   Post extubation respiratory monitoring   F/u SPO2 / ABG   Analgesia , Lung expansion maneuvers    [x] 4L NC   Encourage incentive spirometry, continue pulse ox monitoring, follow ABGs                 ***GI***  [x] NPO    [x] Protonix    ***Renal***  GFR f/u  Continue to monitor I/Os, BUN/Creatinine.   Replete lytes PRN  Zendejas present    ***ID***  Perioperative antibiotics     ***Endocrine***  [x] DM2: HbA1c 5.3%              - [x] Insulin gtt              - Need tight glycemic control to prevent wound infection.      Patient requires continuous monitoring with bedside rhythm monitoring, pulse oximetry monitoring, and continuous central venous and arterial pressure monitoring; and intermittent blood gas analysis. Care plan discussed with the ICU care team.   Patient remained critical, at risk for life threatening decompensation.    I have spent 40 minutes providing critical care management to this patient.    By signing my name below, I, Mike Diaz, attest that this documentation has been prepared under the direction and in the presence of Raquel Berkowitz MD   Electronically signed: Trell Young, 05-12-23 @ 17:27    I, Raquel Berkowitz, personally performed the services described in this documentation. all medical record entries made by the scribe were at my direction and in my presence. I have reviewed the chart and agree that the record reflects my personal performance and is accurate and complete  Electronically signed: Raquel Berkowitz MD  Patient seen and examined at the bedside.    Remained critically ill on continuous ICU monitoring.    OBJECTIVE:  Vital Signs Last 24 Hrs  T(C): 36.5 (12 May 2023 16:00), Max: 36.9 (11 May 2023 18:13)  T(F): 97.7 (12 May 2023 16:00), Max: 98.5 (11 May 2023 19:03)  HR: 90 (12 May 2023 17:00) (60 - 98)  BP: 120/59 (12 May 2023 05:11) (93/54 - 143/69)  BP(mean): --  RR: 22 (12 May 2023 17:00) (16 - 34)  SpO2: 99% (12 May 2023 17:00) (95% - 100%)    Parameters below as of 12 May 2023 16:00  Patient On (Oxygen Delivery Method): nasal cannula    O2 Concentration (%): 4    REVIEW OF SYSTEMS:  + incisional pain     Physical Exam:  General: Extubated  Neurology: nonfocal  Eyes: bilateral pupils equal and reactive   ENT/Neck: Neck supple, trachea midline, No JVD   Respiratory: Rales noted bilaterally   CV: RRR, S1S2, no murmurs        [x] Sternal dressing, [x] Mediastinal CT x2,         [x] Sinus rhythm,  Abdominal: Soft, NT, ND +BS,   Extremities: 1-2+ pedal edema noted, + peripheral pulses   Skin: No Rashes, Hematoma, Ecchymosis                           Assessment:  37 yr male DM2 / HTN / HLD / Strep bacteremia   Severe Aortic insuffiencey  S/P AVR (M), JUANY ligation   intra op ROSARIO moderate MR /Severe AI WITH ef 65%  Post op hypovolemia, hypotension with Lactic acidosis   Labile hemodynamics with episodes of severe HTN   Post op Pain   Hyperglycemia, hypokalemia      Plan:   ***Neuro***  [x] Nonfocal     Post operative neuro assessment   Tylenol, Gabapentin, PCA pain management.     ***Cardiovascular***  Post op volume resuscitation initially HTN subsequently requiring Levophed     Invasive hemodynamic monitoring, assess perfusion indices   SR 80 / CVP 4-6 / MAP 60- 76/SvO2 74 / Hct 37.7%/ Lactate 7.1  [x] Primacor 0.125 mcg/kg/min   [x] Cardene (Dced)  [x] Levophed 0.04 mcg/kg/min   Volume: [x] LR 1000 cc    Monitor chest tube outputs   [x] Statin  [x] ASA  Start Coumadin dosing tonight for INR of 1.20  Serial EKG back up epicardial pacing ( wires isolated)  Replete K Target K >4 Mg >2    ***Pulmonary***  Extubated in OR   Post extubation respiratory monitoring   F/u SPO2 / ABG   Analgesia , Lung expansion maneuvers    [x] 4L NC   Encourage incentive spirometry, continue pulse ox monitoring, follow ABGs                 ***GI***  [x] NPO    [x] Protonix    ***Renal***  GFR f/u  Continue to monitor I/Os, BUN/Creatinine.   Replete lytes PRN  Zendejas present    ***ID***  Perioperative antibiotics     ***Endocrine***  [x] DM2: HbA1c 5.3%              - [x] Insulin gtt              - Need tight glycemic control to prevent wound infection.      Patient requires continuous monitoring with bedside rhythm monitoring, pulse oximetry monitoring, and continuous central venous and arterial pressure monitoring; and intermittent blood gas analysis. Care plan discussed with the ICU care team.   Patient remained critical, at risk for life threatening decompensation.    I have spent 40 minutes providing critical care management to this patient.    By signing my name below, I, Mike Diaz, attest that this documentation has been prepared under the direction and in the presence of Raquel Berkowitz MD   Electronically signed: Trell Young, 05-12-23 @ 17:27    I, Raquel Berkowitz, personally performed the services described in this documentation. all medical record entries made by the scribe were at my direction and in my presence. I have reviewed the chart and agree that the record reflects my personal performance and is accurate and complete  Electronically signed: Raquel Berkowitz MD

## 2023-05-12 NOTE — BRIEF OPERATIVE NOTE - OPERATION/FINDINGS
AVR ([place] 23mm) for endocarditis  Bypass time: 137 min Xclamp time: 104 min  EF nml, no blood products, extubated in the OR

## 2023-05-13 LAB
ALBUMIN SERPL ELPH-MCNC: 4.2 G/DL — SIGNIFICANT CHANGE UP (ref 3.3–5)
ALP SERPL-CCNC: 40 U/L — SIGNIFICANT CHANGE UP (ref 40–120)
ALT FLD-CCNC: 21 U/L — SIGNIFICANT CHANGE UP (ref 10–45)
ANION GAP SERPL CALC-SCNC: 14 MMOL/L — SIGNIFICANT CHANGE UP (ref 5–17)
AST SERPL-CCNC: 52 U/L — HIGH (ref 10–40)
BASE EXCESS BLDV CALC-SCNC: -0.3 MMOL/L — SIGNIFICANT CHANGE UP (ref -2–3)
BASE EXCESS BLDV CALC-SCNC: 1.1 MMOL/L — SIGNIFICANT CHANGE UP (ref -2–3)
BASOPHILS # BLD AUTO: 0.02 K/UL — SIGNIFICANT CHANGE UP (ref 0–0.2)
BASOPHILS NFR BLD AUTO: 0.1 % — SIGNIFICANT CHANGE UP (ref 0–2)
BILIRUB SERPL-MCNC: 0.7 MG/DL — SIGNIFICANT CHANGE UP (ref 0.2–1.2)
BUN SERPL-MCNC: 13 MG/DL — SIGNIFICANT CHANGE UP (ref 7–23)
CA-I SERPL-SCNC: 1.17 MMOL/L — SIGNIFICANT CHANGE UP (ref 1.15–1.33)
CA-I SERPL-SCNC: 1.2 MMOL/L — SIGNIFICANT CHANGE UP (ref 1.15–1.33)
CALCIUM SERPL-MCNC: 9.2 MG/DL — SIGNIFICANT CHANGE UP (ref 8.4–10.5)
CHLORIDE BLDV-SCNC: 102 MMOL/L — SIGNIFICANT CHANGE UP (ref 96–108)
CHLORIDE BLDV-SCNC: 102 MMOL/L — SIGNIFICANT CHANGE UP (ref 96–108)
CHLORIDE SERPL-SCNC: 101 MMOL/L — SIGNIFICANT CHANGE UP (ref 96–108)
CO2 BLDV-SCNC: 28 MMOL/L — HIGH (ref 22–26)
CO2 BLDV-SCNC: 29 MMOL/L — HIGH (ref 22–26)
CO2 SERPL-SCNC: 23 MMOL/L — SIGNIFICANT CHANGE UP (ref 22–31)
CREAT SERPL-MCNC: 0.79 MG/DL — SIGNIFICANT CHANGE UP (ref 0.5–1.3)
EGFR: 117 ML/MIN/1.73M2 — SIGNIFICANT CHANGE UP
EOSINOPHIL # BLD AUTO: 0 K/UL — SIGNIFICANT CHANGE UP (ref 0–0.5)
EOSINOPHIL NFR BLD AUTO: 0 % — SIGNIFICANT CHANGE UP (ref 0–6)
GAS PNL BLDA: SIGNIFICANT CHANGE UP
GAS PNL BLDA: SIGNIFICANT CHANGE UP
GAS PNL BLDV: 133 MMOL/L — LOW (ref 136–145)
GAS PNL BLDV: 136 MMOL/L — SIGNIFICANT CHANGE UP (ref 136–145)
GAS PNL BLDV: SIGNIFICANT CHANGE UP
GAS PNL BLDV: SIGNIFICANT CHANGE UP
GLUCOSE BLDC GLUCOMTR-MCNC: 102 MG/DL — HIGH (ref 70–99)
GLUCOSE BLDC GLUCOMTR-MCNC: 106 MG/DL — HIGH (ref 70–99)
GLUCOSE BLDC GLUCOMTR-MCNC: 107 MG/DL — HIGH (ref 70–99)
GLUCOSE BLDC GLUCOMTR-MCNC: 113 MG/DL — HIGH (ref 70–99)
GLUCOSE BLDC GLUCOMTR-MCNC: 118 MG/DL — HIGH (ref 70–99)
GLUCOSE BLDC GLUCOMTR-MCNC: 120 MG/DL — HIGH (ref 70–99)
GLUCOSE BLDC GLUCOMTR-MCNC: 130 MG/DL — HIGH (ref 70–99)
GLUCOSE BLDC GLUCOMTR-MCNC: 131 MG/DL — HIGH (ref 70–99)
GLUCOSE BLDC GLUCOMTR-MCNC: 139 MG/DL — HIGH (ref 70–99)
GLUCOSE BLDC GLUCOMTR-MCNC: 141 MG/DL — HIGH (ref 70–99)
GLUCOSE BLDC GLUCOMTR-MCNC: 142 MG/DL — HIGH (ref 70–99)
GLUCOSE BLDC GLUCOMTR-MCNC: 97 MG/DL — SIGNIFICANT CHANGE UP (ref 70–99)
GLUCOSE BLDV-MCNC: 106 MG/DL — HIGH (ref 70–99)
GLUCOSE BLDV-MCNC: 94 MG/DL — SIGNIFICANT CHANGE UP (ref 70–99)
GLUCOSE SERPL-MCNC: 114 MG/DL — HIGH (ref 70–99)
GRAM STN FLD: SIGNIFICANT CHANGE UP
HCO3 BLDV-SCNC: 26 MMOL/L — SIGNIFICANT CHANGE UP (ref 22–29)
HCO3 BLDV-SCNC: 28 MMOL/L — SIGNIFICANT CHANGE UP (ref 22–29)
HCT VFR BLD CALC: 32.7 % — LOW (ref 39–50)
HCT VFR BLDA CALC: 32 % — LOW (ref 39–51)
HCT VFR BLDA CALC: 32 % — LOW (ref 39–51)
HGB BLD CALC-MCNC: 10.6 G/DL — LOW (ref 12.6–17.4)
HGB BLD CALC-MCNC: 10.8 G/DL — LOW (ref 12.6–17.4)
HGB BLD-MCNC: 10.4 G/DL — LOW (ref 13–17)
HOROWITZ INDEX BLDV+IHG-RTO: 36 — SIGNIFICANT CHANGE UP
HOROWITZ INDEX BLDV+IHG-RTO: 36 — SIGNIFICANT CHANGE UP
IMM GRANULOCYTES NFR BLD AUTO: 0.5 % — SIGNIFICANT CHANGE UP (ref 0–0.9)
INR BLD: 1.28 RATIO — HIGH (ref 0.88–1.16)
INR BLD: 1.48 RATIO — HIGH (ref 0.88–1.16)
LACTATE BLDV-MCNC: 1.1 MMOL/L — SIGNIFICANT CHANGE UP (ref 0.5–2)
LACTATE BLDV-MCNC: 2.2 MMOL/L — HIGH (ref 0.5–2)
LYMPHOCYTES # BLD AUTO: 1.45 K/UL — SIGNIFICANT CHANGE UP (ref 1–3.3)
LYMPHOCYTES # BLD AUTO: 8 % — LOW (ref 13–44)
MAGNESIUM SERPL-MCNC: 2.1 MG/DL — SIGNIFICANT CHANGE UP (ref 1.6–2.6)
MCHC RBC-ENTMCNC: 27.1 PG — SIGNIFICANT CHANGE UP (ref 27–34)
MCHC RBC-ENTMCNC: 31.8 GM/DL — LOW (ref 32–36)
MCV RBC AUTO: 85.2 FL — SIGNIFICANT CHANGE UP (ref 80–100)
MONOCYTES # BLD AUTO: 1.17 K/UL — HIGH (ref 0–0.9)
MONOCYTES NFR BLD AUTO: 6.5 % — SIGNIFICANT CHANGE UP (ref 2–14)
NEUTROPHILS # BLD AUTO: 15.32 K/UL — HIGH (ref 1.8–7.4)
NEUTROPHILS NFR BLD AUTO: 84.9 % — HIGH (ref 43–77)
NRBC # BLD: 0 /100 WBCS — SIGNIFICANT CHANGE UP (ref 0–0)
PCO2 BLDV: 51 MMHG — SIGNIFICANT CHANGE UP (ref 42–55)
PCO2 BLDV: 51 MMHG — SIGNIFICANT CHANGE UP (ref 42–55)
PH BLDV: 7.32 — SIGNIFICANT CHANGE UP (ref 7.32–7.43)
PH BLDV: 7.34 — SIGNIFICANT CHANGE UP (ref 7.32–7.43)
PHOSPHATE SERPL-MCNC: 3.8 MG/DL — SIGNIFICANT CHANGE UP (ref 2.5–4.5)
PLATELET # BLD AUTO: 221 K/UL — SIGNIFICANT CHANGE UP (ref 150–400)
PO2 BLDV: 42 MMHG — SIGNIFICANT CHANGE UP (ref 25–45)
PO2 BLDV: 44 MMHG — SIGNIFICANT CHANGE UP (ref 25–45)
POTASSIUM BLDV-SCNC: 4.1 MMOL/L — SIGNIFICANT CHANGE UP (ref 3.5–5.1)
POTASSIUM BLDV-SCNC: 4.5 MMOL/L — SIGNIFICANT CHANGE UP (ref 3.5–5.1)
POTASSIUM SERPL-MCNC: 4.2 MMOL/L — SIGNIFICANT CHANGE UP (ref 3.5–5.3)
POTASSIUM SERPL-SCNC: 4.2 MMOL/L — SIGNIFICANT CHANGE UP (ref 3.5–5.3)
PROT SERPL-MCNC: 7.2 G/DL — SIGNIFICANT CHANGE UP (ref 6–8.3)
PROTHROM AB SERPL-ACNC: 14.7 SEC — HIGH (ref 10.5–13.4)
PROTHROM AB SERPL-ACNC: 17.2 SEC — HIGH (ref 10.5–13.4)
RBC # BLD: 3.84 M/UL — LOW (ref 4.2–5.8)
RBC # FLD: 14.5 % — SIGNIFICANT CHANGE UP (ref 10.3–14.5)
SAO2 % BLDV: 72.2 % — SIGNIFICANT CHANGE UP (ref 67–88)
SAO2 % BLDV: 73.2 % — SIGNIFICANT CHANGE UP (ref 67–88)
SODIUM SERPL-SCNC: 138 MMOL/L — SIGNIFICANT CHANGE UP (ref 135–145)
SPECIMEN SOURCE: SIGNIFICANT CHANGE UP
WBC # BLD: 18.05 K/UL — HIGH (ref 3.8–10.5)
WBC # FLD AUTO: 18.05 K/UL — HIGH (ref 3.8–10.5)

## 2023-05-13 PROCEDURE — 99232 SBSQ HOSP IP/OBS MODERATE 35: CPT

## 2023-05-13 PROCEDURE — 71045 X-RAY EXAM CHEST 1 VIEW: CPT | Mod: 26

## 2023-05-13 PROCEDURE — 99291 CRITICAL CARE FIRST HOUR: CPT

## 2023-05-13 PROCEDURE — 93010 ELECTROCARDIOGRAM REPORT: CPT

## 2023-05-13 RX ORDER — METOPROLOL TARTRATE 50 MG
12.5 TABLET ORAL ONCE
Refills: 0 | Status: COMPLETED | OUTPATIENT
Start: 2023-05-13 | End: 2023-05-13

## 2023-05-13 RX ORDER — ENOXAPARIN SODIUM 100 MG/ML
40 INJECTION SUBCUTANEOUS EVERY 24 HOURS
Refills: 0 | Status: DISCONTINUED | OUTPATIENT
Start: 2023-05-13 | End: 2023-05-14

## 2023-05-13 RX ORDER — METOPROLOL TARTRATE 50 MG
25 TABLET ORAL
Refills: 0 | Status: DISCONTINUED | OUTPATIENT
Start: 2023-05-13 | End: 2023-05-13

## 2023-05-13 RX ORDER — SIMETHICONE 80 MG/1
80 TABLET, CHEWABLE ORAL EVERY 4 HOURS
Refills: 0 | Status: DISCONTINUED | OUTPATIENT
Start: 2023-05-13 | End: 2023-05-18

## 2023-05-13 RX ORDER — METOPROLOL TARTRATE 50 MG
25 TABLET ORAL EVERY 8 HOURS
Refills: 0 | Status: DISCONTINUED | OUTPATIENT
Start: 2023-05-13 | End: 2023-05-17

## 2023-05-13 RX ORDER — WARFARIN SODIUM 2.5 MG/1
5 TABLET ORAL ONCE
Refills: 0 | Status: COMPLETED | OUTPATIENT
Start: 2023-05-13 | End: 2023-05-13

## 2023-05-13 RX ORDER — INSULIN LISPRO 100/ML
VIAL (ML) SUBCUTANEOUS
Refills: 0 | Status: DISCONTINUED | OUTPATIENT
Start: 2023-05-13 | End: 2023-05-15

## 2023-05-13 RX ORDER — AMLODIPINE BESYLATE 2.5 MG/1
5 TABLET ORAL DAILY
Refills: 0 | Status: DISCONTINUED | OUTPATIENT
Start: 2023-05-13 | End: 2023-05-17

## 2023-05-13 RX ORDER — METOPROLOL TARTRATE 50 MG
12.5 TABLET ORAL EVERY 12 HOURS
Refills: 0 | Status: DISCONTINUED | OUTPATIENT
Start: 2023-05-13 | End: 2023-05-13

## 2023-05-13 RX ADMIN — PANTOPRAZOLE SODIUM 40 MILLIGRAM(S): 20 TABLET, DELAYED RELEASE ORAL at 11:46

## 2023-05-13 RX ADMIN — Medication 650 MILLIGRAM(S): at 23:30

## 2023-05-13 RX ADMIN — AMIODARONE HYDROCHLORIDE 400 MILLIGRAM(S): 400 TABLET ORAL at 05:15

## 2023-05-13 RX ADMIN — Medication 100 MILLIGRAM(S): at 11:47

## 2023-05-13 RX ADMIN — Medication 650 MILLIGRAM(S): at 11:47

## 2023-05-13 RX ADMIN — Medication 325 MILLIGRAM(S): at 11:46

## 2023-05-13 RX ADMIN — GABAPENTIN 100 MILLIGRAM(S): 400 CAPSULE ORAL at 14:32

## 2023-05-13 RX ADMIN — INSULIN HUMAN 3 UNIT(S)/HR: 100 INJECTION, SOLUTION SUBCUTANEOUS at 00:05

## 2023-05-13 RX ADMIN — Medication 25 MILLIGRAM(S): at 16:40

## 2023-05-13 RX ADMIN — Medication 650 MILLIGRAM(S): at 12:16

## 2023-05-13 RX ADMIN — MILRINONE LACTATE 3.02 MICROGRAM(S)/KG/MIN: 1 INJECTION, SOLUTION INTRAVENOUS at 00:04

## 2023-05-13 RX ADMIN — SIMETHICONE 80 MILLIGRAM(S): 80 TABLET, CHEWABLE ORAL at 23:20

## 2023-05-13 RX ADMIN — GABAPENTIN 100 MILLIGRAM(S): 400 CAPSULE ORAL at 22:25

## 2023-05-13 RX ADMIN — Medication 25 MILLIGRAM(S): at 22:25

## 2023-05-13 RX ADMIN — AMLODIPINE BESYLATE 5 MILLIGRAM(S): 2.5 TABLET ORAL at 19:55

## 2023-05-13 RX ADMIN — Medication 650 MILLIGRAM(S): at 17:36

## 2023-05-13 RX ADMIN — Medication 650 MILLIGRAM(S): at 00:15

## 2023-05-13 RX ADMIN — HYDROMORPHONE HYDROCHLORIDE 30 MILLILITER(S): 2 INJECTION INTRAMUSCULAR; INTRAVENOUS; SUBCUTANEOUS at 07:12

## 2023-05-13 RX ADMIN — Medication 500 MILLIGRAM(S): at 17:36

## 2023-05-13 RX ADMIN — Medication 650 MILLIGRAM(S): at 18:27

## 2023-05-13 RX ADMIN — Medication 100 MILLIGRAM(S): at 05:13

## 2023-05-13 RX ADMIN — Medication 500 MILLIGRAM(S): at 05:15

## 2023-05-13 RX ADMIN — WARFARIN SODIUM 5 MILLIGRAM(S): 2.5 TABLET ORAL at 22:25

## 2023-05-13 RX ADMIN — Medication 12.5 MILLIGRAM(S): at 04:15

## 2023-05-13 RX ADMIN — Medication 12.5 MILLIGRAM(S): at 11:46

## 2023-05-13 RX ADMIN — NICARDIPINE HYDROCHLORIDE 25 MG/HR: 30 CAPSULE, EXTENDED RELEASE ORAL at 19:00

## 2023-05-13 RX ADMIN — SENNA PLUS 2 TABLET(S): 8.6 TABLET ORAL at 22:25

## 2023-05-13 RX ADMIN — AMIODARONE HYDROCHLORIDE 400 MILLIGRAM(S): 400 TABLET ORAL at 17:36

## 2023-05-13 RX ADMIN — POLYETHYLENE GLYCOL 3350 17 GRAM(S): 17 POWDER, FOR SOLUTION ORAL at 12:17

## 2023-05-13 RX ADMIN — Medication 650 MILLIGRAM(S): at 05:14

## 2023-05-13 RX ADMIN — SODIUM CHLORIDE 30 MILLILITER(S): 9 INJECTION INTRAMUSCULAR; INTRAVENOUS; SUBCUTANEOUS at 19:00

## 2023-05-13 RX ADMIN — NICARDIPINE HYDROCHLORIDE 25 MG/HR: 30 CAPSULE, EXTENDED RELEASE ORAL at 00:06

## 2023-05-13 RX ADMIN — Medication 650 MILLIGRAM(S): at 05:23

## 2023-05-13 RX ADMIN — NICARDIPINE HYDROCHLORIDE 25 MG/HR: 30 CAPSULE, EXTENDED RELEASE ORAL at 03:48

## 2023-05-13 RX ADMIN — Medication 100 MILLIGRAM(S): at 20:00

## 2023-05-13 RX ADMIN — GABAPENTIN 100 MILLIGRAM(S): 400 CAPSULE ORAL at 05:15

## 2023-05-13 RX ADMIN — HYDROMORPHONE HYDROCHLORIDE 30 MILLILITER(S): 2 INJECTION INTRAMUSCULAR; INTRAVENOUS; SUBCUTANEOUS at 19:18

## 2023-05-13 NOTE — PHYSICAL THERAPY INITIAL EVALUATION ADULT - PERTINENT HX OF CURRENT PROBLEM, REHAB EVAL
37M, PmHx DM, HTN, HLD, "heart murmur secondary to an infection in my blood (3/2023)," Now presents to [facility] from [facility], for Severe AI/endocarditis eval. Pt found to have severe AI. Pt now s/p AVR (5/12/23). Pt is a 36 yo M, PmHx DM, HTN, HLD, "heart murmur secondary to an infection in my blood (3/2023)," Now presents to [facility] from [facility], for Severe AI/endocarditis eval. Pt found to have severe AI. Pt now s/p AVR (5/12/23).

## 2023-05-13 NOTE — PHYSICAL THERAPY INITIAL EVALUATION ADULT - IMPAIRMENTS FOUND, PT EVAL
aerobic capacity/endurance/gait, locomotion, and balance Verbal/written post procedure instructions were given to patient/caregiver

## 2023-05-13 NOTE — PROGRESS NOTE ADULT - SUBJECTIVE AND OBJECTIVE BOX
infectious diseases progress note:    Patient is a 37y old  Male who presents with a chief complaint of Endocarditis eval (13 May 2023 09:18)         Allergies    No Known Allergies    Intolerances        ANTIBIOTICS/RELEVANT:  antimicrobials  cefuroxime  IVPB 1500 milliGRAM(s) IV Intermittent every 8 hours    immunologic:    OTHER:  acetaminophen     Tablet .. 650 milliGRAM(s) Oral every 6 hours  aMIOdarone    Tablet 400 milliGRAM(s) Oral two times a day  ascorbic acid 500 milliGRAM(s) Oral two times a day  aspirin enteric coated 325 milliGRAM(s) Oral daily  chlorhexidine 2% Cloths 1 Application(s) Topical daily  dextrose 50% Injectable 50 milliLiter(s) IV Push every 15 minutes  gabapentin 100 milliGRAM(s) Oral every 8 hours  HYDROmorphone  Injectable 0.5 milliGRAM(s) IV Push every 6 hours PRN  HYDROmorphone PCA (1 mG/mL) 30 milliLiter(s) PCA Continuous PCA Continuous  HYDROmorphone PCA (1 mG/mL) Rescue Clinician Bolus 0.5 milliGRAM(s) IV Push every 15 minutes PRN  insulin regular Infusion 3 Unit(s)/Hr IV Continuous <Continuous>  metoprolol tartrate 12.5 milliGRAM(s) Oral every 12 hours  nalbuphine Injectable 2.5 milliGRAM(s) IV Push every 6 hours PRN  naloxone Injectable 0.1 milliGRAM(s) IV Push every 3 minutes PRN  niCARdipine Infusion 5 mG/Hr IV Continuous <Continuous>  ondansetron Injectable 4 milliGRAM(s) IV Push every 6 hours PRN  pantoprazole  Injectable 40 milliGRAM(s) IV Push daily  polyethylene glycol 3350 17 Gram(s) Oral daily  senna 2 Tablet(s) Oral at bedtime  sodium chloride 0.9%. 1000 milliLiter(s) IV Continuous <Continuous>      Objective:  Vital Signs Last 24 Hrs  T(C): 37.2 (13 May 2023 08:00), Max: 37.2 (13 May 2023 08:00)  T(F): 99 (13 May 2023 08:00), Max: 99 (13 May 2023 08:00)  HR: 75 (13 May 2023 09:30) (74 - 98)  BP: 129/68 (13 May 2023 09:30) (129/68 - 129/68)  BP(mean): 92 (13 May 2023 09:30) (92 - 92)  RR: 11 (13 May 2023 09:30) (1 - 36)  SpO2: 100% (13 May 2023 09:30) (95% - 100%)    Parameters below as of 13 May 2023 08:00  Patient On (Oxygen Delivery Method): nasal cannula  O2 Flow (L/min): 4         Eyes:LORNE, EOMI  Ear/Nose/Throat: no oral lesion, no sinus tenderness on percussion	  Neck:no JVD, no lymphadenopathy, supple  Respiratory: CTA kay  Cardiovascular: S1S2 RRR, no murmurs  Gastrointestinal:soft, (+) BS, no HSM  Extremities:no e/e/c        LABS:                        10.4   18.05 )-----------( 221      ( 13 May 2023 00:19 )             32.7     05-13    138  |  101  |  13  ----------------------------<  114<H>  4.2   |  23  |  0.79    Ca    9.2      13 May 2023 00:19  Phos  3.8     05-13  Mg     2.1     05-13    TPro  7.2  /  Alb  4.2  /  TBili  0.7  /  DBili  x   /  AST  52<H>  /  ALT  21  /  AlkPhos  40  05-13    PT/INR - ( 13 May 2023 00:19 )   PT: 14.7 sec;   INR: 1.28 ratio         PTT - ( 12 May 2023 05:02 )  PTT:32.9 sec        MICROBIOLOGY:    RECENT CULTURES:  05-12 @ 14:57 .Tissue Other       No polymorphonuclear leukocytes seen per low power field  No organisms seen per oil power field             05-10 @ 01:13 .Blood Blood                No growth to date.          RESPIRATORY CULTURES:              RADIOLOGY & ADDITIONAL STUDIES:        Pager [phone]  After 5 pm/weekends or if no response :[phone]

## 2023-05-13 NOTE — PROGRESS NOTE ADULT - SUBJECTIVE AND OBJECTIVE BOX
Day 1 of Anesthesia Pain Management Service    SUBJECTIVE: Patient is doing well with IV PCA    Pain Scale Score:	[X] Refer to charted pain scores    THERAPY:    [ ] IV PCA Morphine		[ ] 5 mg/mL	[ ] 1 mg/mL  [X] IV PCA Hydromorphone	[ ] 5 mg/mL	[X] 1 mg/mL  [ ] IV PCA Fentanyl		[ ] 50 micrograms/mL    Demand dose: 0.3 mg     Lockout: 6 minutes   Continuous Rate: 0 mg/hr  4 Hour Limit: 5 mg    MEDICATIONS  (STANDING):  acetaminophen     Tablet .. 650 milliGRAM(s) Oral every 6 hours  aMIOdarone    Tablet 400 milliGRAM(s) Oral two times a day  ascorbic acid 500 milliGRAM(s) Oral two times a day  aspirin enteric coated 325 milliGRAM(s) Oral daily  cefuroxime  IVPB 1500 milliGRAM(s) IV Intermittent every 8 hours  chlorhexidine 2% Cloths 1 Application(s) Topical daily  dextrose 50% Injectable 50 milliLiter(s) IV Push every 15 minutes  gabapentin 100 milliGRAM(s) Oral every 8 hours  HYDROmorphone PCA (1 mG/mL) 30 milliLiter(s) PCA Continuous PCA Continuous  insulin regular Infusion 3 Unit(s)/Hr (3 mL/Hr) IV Continuous <Continuous>  metoprolol tartrate 12.5 milliGRAM(s) Oral every 12 hours  niCARdipine Infusion 5 mG/Hr (25 mL/Hr) IV Continuous <Continuous>  pantoprazole  Injectable 40 milliGRAM(s) IV Push daily  polyethylene glycol 3350 17 Gram(s) Oral daily  senna 2 Tablet(s) Oral at bedtime  sodium chloride 0.9%. 1000 milliLiter(s) (30 mL/Hr) IV Continuous <Continuous>    MEDICATIONS  (PRN):  HYDROmorphone  Injectable 0.5 milliGRAM(s) IV Push every 6 hours PRN Breakthrough Pain  HYDROmorphone PCA (1 mG/mL) Rescue Clinician Bolus 0.5 milliGRAM(s) IV Push every 15 minutes PRN for Pain Scale GREATER THAN 6  nalbuphine Injectable 2.5 milliGRAM(s) IV Push every 6 hours PRN Pruritus  naloxone Injectable 0.1 milliGRAM(s) IV Push every 3 minutes PRN For ANY of the following changes in patient status:  A. RR LESS THAN 10 breaths per minute, B. Oxygen saturation LESS THAN 90%, C. Sedation score of 6  ondansetron Injectable 4 milliGRAM(s) IV Push every 6 hours PRN Nausea      OBJECTIVE:    Sedation Score:	[ X] Alert	[ ] Drowsy 	[ ] Arousable	[ ] Asleep	[ ] Unresponsive    Side Effects:	[X ] None	[ ] Nausea	[ ] Vomiting	[ ] Pruritus  		[ ] Other:    Vital Signs Last 24 Hrs  T(C): 37.2 (13 May 2023 08:00), Max: 37.2 (13 May 2023 08:00)  T(F): 99 (13 May 2023 08:00), Max: 99 (13 May 2023 08:00)  HR: 75 (13 May 2023 08:00) (74 - 98)  BP: --  BP(mean): --  RR: 6 (13 May 2023 08:00) (1 - 36)  SpO2: 100% (13 May 2023 08:00) (95% - 100%)    Parameters below as of 13 May 2023 08:00  Patient On (Oxygen Delivery Method): nasal cannula  O2 Flow (L/min): 4      ASSESSMENT/ PLAN    Therapy to  be:               [X] Continued   [ ] Discontinued   [ ] Changed to PRN Analgesics    Documentation and Verification of current medications:   [X] Done	[ ] Not done, not eligible    Comments:

## 2023-05-13 NOTE — PROGRESS NOTE ADULT - SUBJECTIVE AND OBJECTIVE BOX
Cardiovascular Disease Progress Note  Date of Service: 05-13-23 @ 07:15    Overnight events: No acute events overnight.    Patient reports post operative pain.     Objective Findings:  T(C): 37.1 (05-13-23 @ 04:00), Max: 37.1 (05-13-23 @ 00:00)  HR: 82 (05-13-23 @ 06:45) (77 - 98)  BP: --  RR: 17 (05-13-23 @ 06:45) (1 - 36)  SpO2: 100% (05-13-23 @ 06:45) (95% - 100%)  Wt(kg): --  Daily     Daily       Physical Exam:  Gen: NAD; Patient resting comfortably  HEENT: EOMI, Normocephalic/ atraumatic  CV: RRR, normal S1 + S2, no m/r/g  Lungs:  Normal respiratory effort; decreased air entry to auscultation bilaterally  Abd: soft, non-tender; bowel sounds present  Ext: 1+ edema; warm and well perfused    Telemetry: SInus    Laboratory Data:                        10.4   18.05 )-----------( 221      ( 13 May 2023 00:19 )             32.7     05-13    138  |  101  |  13  ----------------------------<  114<H>  4.2   |  23  |  0.79    Ca    9.2      13 May 2023 00:19  Phos  3.8     05-13  Mg     2.1     05-13    TPro  7.2  /  Alb  4.2  /  TBili  0.7  /  DBili  x   /  AST  52<H>  /  ALT  21  /  AlkPhos  40  05-13    PT/INR - ( 13 May 2023 00:19 )   PT: 14.7 sec;   INR: 1.28 ratio         PTT - ( 12 May 2023 05:02 )  PTT:32.9 sec          Inpatient Medications:  MEDICATIONS  (STANDING):  acetaminophen     Tablet .. 650 milliGRAM(s) Oral every 6 hours  aMIOdarone    Tablet 400 milliGRAM(s) Oral two times a day  ascorbic acid 500 milliGRAM(s) Oral two times a day  aspirin enteric coated 325 milliGRAM(s) Oral daily  cefuroxime  IVPB 1500 milliGRAM(s) IV Intermittent every 8 hours  chlorhexidine 2% Cloths 1 Application(s) Topical daily  dextrose 50% Injectable 50 milliLiter(s) IV Push every 15 minutes  gabapentin 100 milliGRAM(s) Oral every 8 hours  HYDROmorphone PCA (1 mG/mL) 30 milliLiter(s) PCA Continuous PCA Continuous  insulin regular Infusion 3 Unit(s)/Hr (3 mL/Hr) IV Continuous <Continuous>  metoprolol tartrate 12.5 milliGRAM(s) Oral every 12 hours  niCARdipine Infusion 5 mG/Hr (25 mL/Hr) IV Continuous <Continuous>  pantoprazole  Injectable 40 milliGRAM(s) IV Push daily  polyethylene glycol 3350 17 Gram(s) Oral daily  senna 2 Tablet(s) Oral at bedtime  sodium chloride 0.9%. 1000 milliLiter(s) (30 mL/Hr) IV Continuous <Continuous>      Assessment: 37 year old man with HTN and HLD transferred for severe aortic regurgitation in the setting of bacterial endocarditis.     Plan of Care:    #Severe aortic regurgitation-  Prolapsed RCC in the setting of bacterial endocarditis as per [facility].  BCx negative on this admission.  S/p mechanical AVR on 5/12/2023.   Patient well perfused and lactate WNL.   ID input noted.      Patient will need to establish a PMD for outpatient INR monitoring.     Critically ill patient.  Rest of care as per [facility]ICU.         Over 44 minutes spent on total encounter; more than 50% of the visit was spent counseling and/or coordinating care by the attending physician.      Asad Cutler MD [facility]  Cardiovascular Disease  [phone]

## 2023-05-13 NOTE — PROGRESS NOTE ADULT - SUBJECTIVE AND OBJECTIVE BOX
Patient seen and examined at the bedside.    Remained critically ill on continuous ICU monitoring.    OBJECTIVE:  Vital Signs Last 24 Hrs  T(C): 37.2 (13 May 2023 08:00), Max: 37.2 (13 May 2023 08:00)  T(F): 99 (13 May 2023 08:00), Max: 99 (13 May 2023 08:00)  HR: 75 (13 May 2023 08:00) (74 - 98)  BP: --  BP(mean): --  RR: 6 (13 May 2023 08:00) (1 - 36)  SpO2: 100% (13 May 2023 08:00) (95% - 100%)    Parameters below as of 13 May 2023 08:00  Patient On (Oxygen Delivery Method): nasal cannula  O2 Flow (L/min): 4        Physical Exam:   General: Extubated  Neurology: nonfocal  Eyes: bilateral pupils equal and reactive   ENT/Neck: Neck supple, trachea midline, No JVD   Respiratory: Rales noted bilaterally   CV: RRR, S1S2, no murmurs        [x] Sternal dressing, [x] Mediastinal CT x2, [x] CATE Drain        [x] Sinus rhythm, [x] Temporary Pacing - VVI 50  Abdominal: Soft, NT, ND +BS,   Extremities: 1-2+ pedal edema noted, + peripheral pulses   Skin: No Rashes, Hematoma, Ecchymosis                                    ============================I/O===========================   I&O's Detail    12 May 2023 07:01  -  13 May 2023 07:00  --------------------------------------------------------  IN:    Insulin: 63 mL    IV PiggyBack: 250 mL    IV PiggyBack: 120 mL    Lactated Ringers Bolus: 2000 mL    Milrinone: 37.6 mL    Milrinone: 21 mL    NiCARdipine: 142.5 mL    NiCARdipine: 330 mL    Norepinephrine: 4 mL    Oral Fluid: 100 mL    sodium chloride 0.9%: 430 mL  Total IN: 3498.1 mL    OUT:    Bulb (mL): 86 mL    Chest Tube (mL): 85 mL    Chest Tube (mL): 65 mL    Indwelling Catheter - Urethral (mL): 2825 mL  Total OUT: 3061 mL    Total NET: 437.1 mL      13 May 2023 07:01  -  13 May 2023 09:05  --------------------------------------------------------  IN:    NiCARdipine: 35 mL    sodium chloride 0.9%: 30 mL  Total IN: 65 mL    OUT:    Bulb (mL): 0 mL    Chest Tube (mL): 10 mL    Chest Tube (mL): 0 mL    Indwelling Catheter - Urethral (mL): 60 mL    Insulin: 0 mL  Total OUT: 70 mL    Total NET: -5 mL        ============================ LABS =========================                        10.4   18.05 )-----------( 221      ( 13 May 2023 00:19 )             32.7     05-13    138  |  101  |  13  ----------------------------<  114<H>  4.2   |  23  |  0.79    Ca    9.2      13 May 2023 00:19  Phos  3.8     05-13  Mg     2.1     05-13    TPro  7.2  /  Alb  4.2  /  TBili  0.7  /  DBili  x   /  AST  52<H>  /  ALT  21  /  AlkPhos  40  05-13    LIVER FUNCTIONS - ( 13 May 2023 00:19 )  Alb: 4.2 g/dL / Pro: 7.2 g/dL / ALK PHOS: 40 U/L / ALT: 21 U/L / AST: 52 U/L / GGT: x           PT/INR - ( 13 May 2023 00:19 )   PT: 14.7 sec;   INR: 1.28 ratio         PTT - ( 12 May 2023 05:02 )  PTT:32.9 sec  ABG - ( 13 May 2023 04:39 )  pH, Arterial: 7.38  pH, Blood: x     /  pCO2: 43    /  pO2: 150   / HCO3: 25    / Base Excess: 0.1   /  SaO2: 100.0               ======================Micro/Rad/Cardio=================  Culture: Reviewed   CXR: Reviewed  Echo: Reviewed  ======================================================  PAST MEDICAL & SURGICAL HISTORY:  HTN (hypertension)      HLD (hyperlipidemia)      DM (diabetes mellitus)          ======================================================    Assessment:  37M, PmHx DM, HTN, HLD, "heart murmur secondary to an infection in my blood (3/2023),"    Severe aortic regurgitation s/p Aortic valve replacement  Hemodynamic instability  Hypovolemia  Post op respiratory insufficiency  Acute blood loss anemia    ======================================================  Plan:   ***Neuro***  [x] Nonfocal   Post operative neuro assessment   Tylenol, Gabapentin, and PRN Dilaudid for pain management.   PRN Nalbuphine for pruritus    ***Cardiovascular***  Invasive hemodynamic monitoring, assess perfusion indices   SR / CVP 3/ MAP 74/ Hct 32.7%/ Lactate 1.1  [x] Cardene - 5 mG/Hr (25 mL/Hr)  Continuous reassessment of hemodynamics   Rate control with Amiodarone  BP management with Lopressor  Monitor chest tube outputs  [x]  ASA  Serial EKG and cardiac enzymes     ***Pulmonary***  [x] NC 4L  Post op vent management   Follow SpO2, CXR, blood gasses               ***GI***  [x]  Diet:  Regular   [x] Protonix   Bowel regimen with Miralax and Senna  PRN Ondansetron for Nausea    ***Renal***  Continue to monitor I/Os, BUN/Creatinine.   Replete lytes PRN  Zendejas present    ***ID***  Cefuroxime for perioperative antibiotic coverage     ***Endocrine***  [x]  DM2 : HbA1c 5.3%                - [x] Insulin gtt              - Need tight glycemic control to prevent wound infection.        Patient requires continuous monitoring with bedside rhythm monitoring, arterial line, pulse oximetry, ventilator monitoring; intermittent blood gas analysis. Care plan discussed with ICU care team. Patient remains critical; required more than usual ICU care.    patient remain critical; required more than usual post op care; I have spent 35 minutes providing non routine post op care, revaluated multiple times during the day .     Care Plan discussed with the ICU care team. Patient remained critical, at risk for life threatening decompensation.     By signing my name below, I, Jes Moore, attest that this documentation has been prepared under the direction and in the presence of Wei Zamudio MD.  Electronically signed: Trell Beltran, 05-13-23 @ 09:05    IRobb, personally performed the services described in this documentation. all medical record entries made by the scribe were at my direction and in my presence. I have reviewed the chart and agree that the record reflects my personal performance and is accurate and complete  Electronically signed: Wei Zamudio MD. Patient seen and examined at the bedside.    Remained critically ill on continuous ICU monitoring.    OBJECTIVE:  Vital Signs Last 24 Hrs  T(C): 37.2 (13 May 2023 08:00), Max: 37.2 (13 May 2023 08:00)  T(F): 99 (13 May 2023 08:00), Max: 99 (13 May 2023 08:00)  HR: 75 (13 May 2023 08:00) (74 - 98)  BP: --  BP(mean): --  RR: 6 (13 May 2023 08:00) (1 - 36)  SpO2: 100% (13 May 2023 08:00) (95% - 100%)    Parameters below as of 13 May 2023 08:00  Patient On (Oxygen Delivery Method): nasal cannula  O2 Flow (L/min): 4        Physical Exam:   General: Extubated  Neurology: nonfocal  Eyes: bilateral pupils equal and reactive   ENT/Neck: Neck supple, trachea midline, No JVD   Respiratory: Rales noted bilaterally   CV: RRR, S1S2, no murmurs        [x] Sternal dressing, [x] Mediastinal CT x2, [x] CATE Drain        [x] Sinus rhythm, [x] Temporary Pacing - VVI 50  Abdominal: Soft, NT, ND +BS,   Extremities: 1-2+ pedal edema noted, + peripheral pulses   Skin: No Rashes, Hematoma, Ecchymosis                                    ============================I/O===========================   I&O's Detail    12 May 2023 07:01  -  13 May 2023 07:00  --------------------------------------------------------  IN:    Insulin: 63 mL    IV PiggyBack: 250 mL    IV PiggyBack: 120 mL    Lactated Ringers Bolus: 2000 mL    Milrinone: 37.6 mL    Milrinone: 21 mL    NiCARdipine: 142.5 mL    NiCARdipine: 330 mL    Norepinephrine: 4 mL    Oral Fluid: 100 mL    sodium chloride 0.9%: 430 mL  Total IN: 3498.1 mL    OUT:    Bulb (mL): 86 mL    Chest Tube (mL): 85 mL    Chest Tube (mL): 65 mL    Indwelling Catheter - Urethral (mL): 2825 mL  Total OUT: 3061 mL    Total NET: 437.1 mL      13 May 2023 07:01  -  13 May 2023 09:05  --------------------------------------------------------  IN:    NiCARdipine: 35 mL    sodium chloride 0.9%: 30 mL  Total IN: 65 mL    OUT:    Bulb (mL): 0 mL    Chest Tube (mL): 10 mL    Chest Tube (mL): 0 mL    Indwelling Catheter - Urethral (mL): 60 mL    Insulin: 0 mL  Total OUT: 70 mL    Total NET: -5 mL        ============================ LABS =========================                        10.4   18.05 )-----------( 221      ( 13 May 2023 00:19 )             32.7     05-13    138  |  101  |  13  ----------------------------<  114<H>  4.2   |  23  |  0.79    Ca    9.2      13 May 2023 00:19  Phos  3.8     05-13  Mg     2.1     05-13    TPro  7.2  /  Alb  4.2  /  TBili  0.7  /  DBili  x   /  AST  52<H>  /  ALT  21  /  AlkPhos  40  05-13    LIVER FUNCTIONS - ( 13 May 2023 00:19 )  Alb: 4.2 g/dL / Pro: 7.2 g/dL / ALK PHOS: 40 U/L / ALT: 21 U/L / AST: 52 U/L / GGT: x           PT/INR - ( 13 May 2023 00:19 )   PT: 14.7 sec;   INR: 1.28 ratio         PTT - ( 12 May 2023 05:02 )  PTT:32.9 sec  ABG - ( 13 May 2023 04:39 )  pH, Arterial: 7.38  pH, Blood: x     /  pCO2: 43    /  pO2: 150   / HCO3: 25    / Base Excess: 0.1   /  SaO2: 100.0               ======================Micro/Rad/Cardio=================  Culture: Reviewed   CXR: Reviewed  Echo: Reviewed  ======================================================  PAST MEDICAL & SURGICAL HISTORY:  HTN (hypertension)      HLD (hyperlipidemia)      DM (diabetes mellitus)          ======================================================    Assessment:  37M, PmHx DM, HTN, HLD, "heart murmur secondary to an infection in my blood (3/2023),"  pt was treated for endocarditis ,after cleard, presented for     Severe aortic regurgitation s/p Aortic valve replacement  Hemodynamic instability  hyperglycemia  Post op respiratory insufficiency  Acute blood loss anemia    ======================================================  Plan:   ***Neuro***  [x] Nonfocal   Post operative neuro assessment   Tylenol, Gabapentin, and PRN Dilaudid for pain management.   PRN Nalbuphine for pruritus    ***Cardiovascular***  Invasive hemodynamic monitoring, assess perfusion indices   SR / CVP 3/ MAP 74/ Hct 32.7%/ Lactate 1.1  [x] Cardene - 5 mG/Hr (25 mL/Hr) titrate   Continuous reassessment of hemodynamics   Rate control with Amiodarone  BP management with Lopressor  Monitor chest tube outputs  [x]  ASA  Serial EKG and cardiac enzymes     ***Pulmonary***  [x] NC 4L  Post op vent management   Follow SpO2, CXR, blood gasses               ***GI***  [x]  Diet:  Regular   [x] Protonix   Bowel regimen with Miralax and Senna  PRN Ondansetron for Nausea    ***Renal***  Continue to monitor I/Os, BUN/Creatinine.   Replete lytes PRN  Zendejas present    ***ID***  Cefuroxime for perioperative antibiotic coverage     ***Endocrine***  [x]  DM2 : HbA1c 5.3%                - [x] Insulin gtt              - Need tight glycemic control to prevent wound infection.        Patient requires continuous monitoring with bedside rhythm monitoring, arterial line, pulse oximetry,  intermittent blood gas analysis. Care plan discussed with ICU care team. Patient remains critical; required more than usual ICU care.    patient remain critical; required more than usual post op care; I have spent 35 minutes providing non routine post op care, revaluated multiple times during the day .     Care Plan discussed with the ICU care team. Patient remained critical, at risk for life threatening decompensation.     By signing my name below, I, Jes Moore, attest that this documentation has been prepared under the direction and in the presence of Wei Zamudio MD.  Electronically signed: Trell Beltran, 05-13-23 @ 09:05    I, Robb Carvajal, personally performed the services described in this documentation. all medical record entries made by the armandoibsamuel were at my direction and in my presence. I have reviewed the chart and agree that the record reflects my personal performance and is accurate and complete  Electronically signed: Wei Zamudio MD.

## 2023-05-14 DIAGNOSIS — Z95.2 PRESENCE OF PROSTHETIC HEART VALVE: ICD-10-CM

## 2023-05-14 LAB
ANION GAP SERPL CALC-SCNC: 11 MMOL/L — SIGNIFICANT CHANGE UP (ref 5–17)
BASOPHILS # BLD AUTO: 0.03 K/UL — SIGNIFICANT CHANGE UP (ref 0–0.2)
BASOPHILS NFR BLD AUTO: 0.1 % — SIGNIFICANT CHANGE UP (ref 0–2)
BUN SERPL-MCNC: 15 MG/DL — SIGNIFICANT CHANGE UP (ref 7–23)
CALCIUM SERPL-MCNC: 8.7 MG/DL — SIGNIFICANT CHANGE UP (ref 8.4–10.5)
CHLORIDE SERPL-SCNC: 98 MMOL/L — SIGNIFICANT CHANGE UP (ref 96–108)
CO2 SERPL-SCNC: 24 MMOL/L — SIGNIFICANT CHANGE UP (ref 22–31)
CREAT SERPL-MCNC: 0.83 MG/DL — SIGNIFICANT CHANGE UP (ref 0.5–1.3)
EGFR: 115 ML/MIN/1.73M2 — SIGNIFICANT CHANGE UP
EOSINOPHIL # BLD AUTO: 0 K/UL — SIGNIFICANT CHANGE UP (ref 0–0.5)
EOSINOPHIL NFR BLD AUTO: 0 % — SIGNIFICANT CHANGE UP (ref 0–6)
GAS PNL BLDA: SIGNIFICANT CHANGE UP
GLUCOSE BLDC GLUCOMTR-MCNC: 108 MG/DL — HIGH (ref 70–99)
GLUCOSE BLDC GLUCOMTR-MCNC: 119 MG/DL — HIGH (ref 70–99)
GLUCOSE BLDC GLUCOMTR-MCNC: 119 MG/DL — HIGH (ref 70–99)
GLUCOSE BLDC GLUCOMTR-MCNC: 126 MG/DL — HIGH (ref 70–99)
GLUCOSE SERPL-MCNC: 135 MG/DL — HIGH (ref 70–99)
HCT VFR BLD CALC: 28.5 % — LOW (ref 39–50)
HGB BLD-MCNC: 9.3 G/DL — LOW (ref 13–17)
IMM GRANULOCYTES NFR BLD AUTO: 0.8 % — SIGNIFICANT CHANGE UP (ref 0–0.9)
INR BLD: 1.42 RATIO — HIGH (ref 0.88–1.16)
LYMPHOCYTES # BLD AUTO: 1.82 K/UL — SIGNIFICANT CHANGE UP (ref 1–3.3)
LYMPHOCYTES # BLD AUTO: 7.9 % — LOW (ref 13–44)
MAGNESIUM SERPL-MCNC: 2.2 MG/DL — SIGNIFICANT CHANGE UP (ref 1.6–2.6)
MANUAL SMEAR VERIFICATION: SIGNIFICANT CHANGE UP
MCHC RBC-ENTMCNC: 27.7 PG — SIGNIFICANT CHANGE UP (ref 27–34)
MCHC RBC-ENTMCNC: 32.6 GM/DL — SIGNIFICANT CHANGE UP (ref 32–36)
MCV RBC AUTO: 84.8 FL — SIGNIFICANT CHANGE UP (ref 80–100)
MONOCYTES # BLD AUTO: 1.89 K/UL — HIGH (ref 0–0.9)
MONOCYTES NFR BLD AUTO: 8.2 % — SIGNIFICANT CHANGE UP (ref 2–14)
NEUTROPHILS # BLD AUTO: 19.24 K/UL — HIGH (ref 1.8–7.4)
NEUTROPHILS NFR BLD AUTO: 83 % — HIGH (ref 43–77)
NRBC # BLD: 0 /100 WBCS — SIGNIFICANT CHANGE UP (ref 0–0)
PHOSPHATE SERPL-MCNC: 2.8 MG/DL — SIGNIFICANT CHANGE UP (ref 2.5–4.5)
PLAT MORPH BLD: NORMAL — SIGNIFICANT CHANGE UP
PLATELET # BLD AUTO: 196 K/UL — SIGNIFICANT CHANGE UP (ref 150–400)
POTASSIUM SERPL-MCNC: 4.8 MMOL/L — SIGNIFICANT CHANGE UP (ref 3.5–5.3)
POTASSIUM SERPL-SCNC: 4.8 MMOL/L — SIGNIFICANT CHANGE UP (ref 3.5–5.3)
PROTHROM AB SERPL-ACNC: 16.5 SEC — HIGH (ref 10.5–13.4)
RBC # BLD: 3.36 M/UL — LOW (ref 4.2–5.8)
RBC # FLD: 14.6 % — HIGH (ref 10.3–14.5)
RBC BLD AUTO: SIGNIFICANT CHANGE UP
SODIUM SERPL-SCNC: 133 MMOL/L — LOW (ref 135–145)
WBC # BLD: 23.17 K/UL — HIGH (ref 3.8–10.5)
WBC # FLD AUTO: 23.17 K/UL — HIGH (ref 3.8–10.5)

## 2023-05-14 PROCEDURE — 71045 X-RAY EXAM CHEST 1 VIEW: CPT | Mod: 26

## 2023-05-14 PROCEDURE — 99232 SBSQ HOSP IP/OBS MODERATE 35: CPT

## 2023-05-14 PROCEDURE — 93010 ELECTROCARDIOGRAM REPORT: CPT

## 2023-05-14 RX ORDER — WARFARIN SODIUM 2.5 MG/1
5 TABLET ORAL ONCE
Refills: 0 | Status: COMPLETED | OUTPATIENT
Start: 2023-05-14 | End: 2023-05-14

## 2023-05-14 RX ORDER — SODIUM CHLORIDE 9 MG/ML
3 INJECTION INTRAMUSCULAR; INTRAVENOUS; SUBCUTANEOUS EVERY 8 HOURS
Refills: 0 | Status: DISCONTINUED | OUTPATIENT
Start: 2023-05-14 | End: 2023-05-18

## 2023-05-14 RX ORDER — FAMOTIDINE 10 MG/ML
20 INJECTION INTRAVENOUS
Refills: 0 | Status: DISCONTINUED | OUTPATIENT
Start: 2023-05-14 | End: 2023-05-14

## 2023-05-14 RX ORDER — ASPIRIN/CALCIUM CARB/MAGNESIUM 324 MG
81 TABLET ORAL DAILY
Refills: 0 | Status: DISCONTINUED | OUTPATIENT
Start: 2023-05-15 | End: 2023-05-18

## 2023-05-14 RX ORDER — PANTOPRAZOLE SODIUM 20 MG/1
40 TABLET, DELAYED RELEASE ORAL
Refills: 0 | Status: DISCONTINUED | OUTPATIENT
Start: 2023-05-14 | End: 2023-05-18

## 2023-05-14 RX ORDER — HEPARIN SODIUM 5000 [USP'U]/ML
5000 INJECTION INTRAVENOUS; SUBCUTANEOUS EVERY 8 HOURS
Refills: 0 | Status: DISCONTINUED | OUTPATIENT
Start: 2023-05-14 | End: 2023-05-17

## 2023-05-14 RX ORDER — IRON SUCROSE 20 MG/ML
200 INJECTION, SOLUTION INTRAVENOUS EVERY 24 HOURS
Refills: 0 | Status: COMPLETED | OUTPATIENT
Start: 2023-05-14 | End: 2023-05-18

## 2023-05-14 RX ADMIN — GABAPENTIN 100 MILLIGRAM(S): 400 CAPSULE ORAL at 05:01

## 2023-05-14 RX ADMIN — Medication 650 MILLIGRAM(S): at 05:01

## 2023-05-14 RX ADMIN — POLYETHYLENE GLYCOL 3350 17 GRAM(S): 17 POWDER, FOR SOLUTION ORAL at 11:09

## 2023-05-14 RX ADMIN — Medication 25 MILLIGRAM(S): at 21:18

## 2023-05-14 RX ADMIN — Medication 650 MILLIGRAM(S): at 11:09

## 2023-05-14 RX ADMIN — Medication 25 MILLIGRAM(S): at 05:01

## 2023-05-14 RX ADMIN — HEPARIN SODIUM 5000 UNIT(S): 5000 INJECTION INTRAVENOUS; SUBCUTANEOUS at 05:01

## 2023-05-14 RX ADMIN — SIMETHICONE 80 MILLIGRAM(S): 80 TABLET, CHEWABLE ORAL at 09:13

## 2023-05-14 RX ADMIN — SODIUM CHLORIDE 3 MILLILITER(S): 9 INJECTION INTRAMUSCULAR; INTRAVENOUS; SUBCUTANEOUS at 21:35

## 2023-05-14 RX ADMIN — GABAPENTIN 100 MILLIGRAM(S): 400 CAPSULE ORAL at 21:17

## 2023-05-14 RX ADMIN — Medication 650 MILLIGRAM(S): at 05:31

## 2023-05-14 RX ADMIN — Medication 325 MILLIGRAM(S): at 11:09

## 2023-05-14 RX ADMIN — PANTOPRAZOLE SODIUM 40 MILLIGRAM(S): 20 TABLET, DELAYED RELEASE ORAL at 11:09

## 2023-05-14 RX ADMIN — Medication 650 MILLIGRAM(S): at 00:00

## 2023-05-14 RX ADMIN — AMIODARONE HYDROCHLORIDE 400 MILLIGRAM(S): 400 TABLET ORAL at 17:01

## 2023-05-14 RX ADMIN — HYDROMORPHONE HYDROCHLORIDE 30 MILLILITER(S): 2 INJECTION INTRAMUSCULAR; INTRAVENOUS; SUBCUTANEOUS at 07:32

## 2023-05-14 RX ADMIN — GABAPENTIN 100 MILLIGRAM(S): 400 CAPSULE ORAL at 13:01

## 2023-05-14 RX ADMIN — HYDROMORPHONE HYDROCHLORIDE 30 MILLILITER(S): 2 INJECTION INTRAMUSCULAR; INTRAVENOUS; SUBCUTANEOUS at 19:20

## 2023-05-14 RX ADMIN — WARFARIN SODIUM 5 MILLIGRAM(S): 2.5 TABLET ORAL at 21:18

## 2023-05-14 RX ADMIN — SENNA PLUS 2 TABLET(S): 8.6 TABLET ORAL at 21:18

## 2023-05-14 RX ADMIN — HEPARIN SODIUM 5000 UNIT(S): 5000 INJECTION INTRAVENOUS; SUBCUTANEOUS at 21:18

## 2023-05-14 RX ADMIN — HEPARIN SODIUM 5000 UNIT(S): 5000 INJECTION INTRAVENOUS; SUBCUTANEOUS at 13:01

## 2023-05-14 RX ADMIN — Medication 500 MILLIGRAM(S): at 05:01

## 2023-05-14 RX ADMIN — HYDROMORPHONE HYDROCHLORIDE 30 MILLILITER(S): 2 INJECTION INTRAMUSCULAR; INTRAVENOUS; SUBCUTANEOUS at 13:21

## 2023-05-14 RX ADMIN — Medication 650 MILLIGRAM(S): at 12:00

## 2023-05-14 RX ADMIN — Medication 100 MILLIGRAM(S): at 04:00

## 2023-05-14 RX ADMIN — CHLORHEXIDINE GLUCONATE 1 APPLICATION(S): 213 SOLUTION TOPICAL at 11:09

## 2023-05-14 RX ADMIN — Medication 500 MILLIGRAM(S): at 17:02

## 2023-05-14 RX ADMIN — AMIODARONE HYDROCHLORIDE 400 MILLIGRAM(S): 400 TABLET ORAL at 05:01

## 2023-05-14 RX ADMIN — Medication 25 MILLIGRAM(S): at 13:01

## 2023-05-14 RX ADMIN — IRON SUCROSE 110 MILLIGRAM(S): 20 INJECTION, SOLUTION INTRAVENOUS at 12:01

## 2023-05-14 RX ADMIN — SODIUM CHLORIDE 3 MILLILITER(S): 9 INJECTION INTRAMUSCULAR; INTRAVENOUS; SUBCUTANEOUS at 13:12

## 2023-05-14 NOTE — PROGRESS NOTE ADULT - PROBLEM SELECTOR PLAN 1
s/p AVR Mechanical on 5/12 /w Dr. Chavez    -Continue ASA 325mg QD  -Continue SQH 5k units Q8  -Coumadin 5mg tonight for INR 1.4  -Continue Lopressor 25mg Q8--currently NSR 70s  -Maintain Pacing Wires set to VVI 50/10  -Mediastinal chest tube and mediastinal CATE in place draining small amount of serosang drainage, possible DC in AM  -Continue PCA pump  -CXR in AM  -Ambulation encouraged  -No BM post op -- continue senna/miralax s/p AVR Mechanical on 5/12 /w Dr. Chavez    -ASA changed to 81mg QD  -Continue SQH 5k units Q8  -Coumadin 5mg tonight for INR 1.4  -Continue Lopressor 25mg Q8--currently NSR 70s  -Maintain Pacing Wires set to VVI 50/10  -Mediastinal chest tube and mediastinal CATE in place draining small amount of serosang drainage, possible DC in AM  -Continue PCA pump  -CXR in AM  -Ambulation encouraged, IS/cough deep breathing exercises encouraged   -No BM post op -- continue senna/miralax

## 2023-05-14 NOTE — PROGRESS NOTE ADULT - SUBJECTIVE AND OBJECTIVE BOX
Day _2__ of Anesthesia Pain Management Service    SUBJECTIVE:    Pain Scale Score	At rest: ___ 	With Activity: ___ 	[x ] Refer to charted pain scores    THERAPY:    [ ] IV PCA Morphine		[ ] 5 mg/mL	[ ] 1 mg/mL  [x ] IV PCA Hydromorphone	[ ] 5 mg/mL	[x ] 1 mg/mL  [ ] IV PCA Fentanyl		[ ] 50 micrograms/mL    Demand dose: 0.3__    lockout:_6_  minutes  Continuous Rate:_0_       MEDICATIONS  (STANDING):  acetaminophen     Tablet .. 650 milliGRAM(s) Oral every 6 hours  aMIOdarone    Tablet 400 milliGRAM(s) Oral two times a day  amLODIPine   Tablet 5 milliGRAM(s) Oral daily  ascorbic acid 500 milliGRAM(s) Oral two times a day  aspirin enteric coated 325 milliGRAM(s) Oral daily  bisacodyl Suppository 10 milliGRAM(s) Rectal once  chlorhexidine 2% Cloths 1 Application(s) Topical daily  dextrose 50% Injectable 50 milliLiter(s) IV Push every 15 minutes  gabapentin 100 milliGRAM(s) Oral every 8 hours  heparin   Injectable 5000 Unit(s) SubCutaneous every 8 hours  HYDROmorphone PCA (1 mG/mL) 30 milliLiter(s) PCA Continuous PCA Continuous  insulin lispro (ADMELOG) corrective regimen sliding scale   SubCutaneous Before meals and at bedtime  iron sucrose IVPB 200 milliGRAM(s) IV Intermittent every 24 hours  metoprolol tartrate 25 milliGRAM(s) Oral every 8 hours  pantoprazole  Injectable 40 milliGRAM(s) IV Push daily  polyethylene glycol 3350 17 Gram(s) Oral daily  senna 2 Tablet(s) Oral at bedtime  warfarin 5 milliGRAM(s) Oral once    MEDICATIONS  (PRN):  HYDROmorphone  Injectable 0.5 milliGRAM(s) IV Push every 6 hours PRN Breakthrough Pain  HYDROmorphone PCA (1 mG/mL) Rescue Clinician Bolus 0.5 milliGRAM(s) IV Push every 15 minutes PRN for Pain Scale GREATER THAN 6  nalbuphine Injectable 2.5 milliGRAM(s) IV Push every 6 hours PRN Pruritus  naloxone Injectable 0.1 milliGRAM(s) IV Push every 3 minutes PRN For ANY of the following changes in patient status:  A. RR LESS THAN 10 breaths per minute, B. Oxygen saturation LESS THAN 90%, C. Sedation score of 6  ondansetron Injectable 4 milliGRAM(s) IV Push every 6 hours PRN Nausea  simethicone 80 milliGRAM(s) Chew every 4 hours PRN Gas      OBJECTIVE:    Sedation Score:	[x ] Alert	[ ] Drowsy 	[ ] Arousable	[ ] Asleep	[ ] Unresponsive    Side Effects:	[x ] None	[ ] Nausea	[ ] Vomiting	[ ] Pruritus  		[ ] Other:    Vital Signs Last 24 Hrs  T(C): 36.4 (14 May 2023 08:00), Max: 37.7 (14 May 2023 04:00)  T(F): 97.5 (14 May 2023 08:00), Max: 99.9 (14 May 2023 04:00)  HR: 73 (14 May 2023 10:00) (72 - 88)  BP: 129/78 (14 May 2023 10:00) (126/76 - 144/88)  BP(mean): 99 (14 May 2023 10:00) (96 - 110)  RR: 21 (14 May 2023 10:00) (6 - 31)  SpO2: 98% (14 May 2023 10:00) (98% - 100%)    Parameters below as of 14 May 2023 08:00  Patient On (Oxygen Delivery Method): room air        ASSESSMENT/ PLAN    Therapy to  be:	[x ] Continue   [ ] Discontinued   [ ] Change to prn Analgesics    Documentation and Verification of current medications:   [X] Done	[ ] Not done, not eligible

## 2023-05-14 NOTE — PROGRESS NOTE ADULT - SUBJECTIVE AND OBJECTIVE BOX
Patient seen and examined at the bedside.    Remained critically ill on continuous ICU monitoring.    OBJECTIVE:  Vital Signs Last 24 Hrs  T(C): 36.4 (14 May 2023 08:00), Max: 37.7 (14 May 2023 04:00)  T(F): 97.5 (14 May 2023 08:00), Max: 99.9 (14 May 2023 04:00)  HR: 75 (14 May 2023 08:00) (72 - 88)  BP: 126/76 (14 May 2023 08:00) (126/76 - 144/88)  BP(mean): 96 (14 May 2023 08:00) (92 - 110)  RR: 21 (14 May 2023 08:00) (2 - 31)  SpO2: 99% (14 May 2023 08:00) (98% - 100%)    Parameters below as of 14 May 2023 08:00  Patient On (Oxygen Delivery Method): room air          Physical Exam:   General: Extubated  Neurology: nonfocal  Eyes: bilateral pupils equal and reactive   ENT/Neck: Neck supple, trachea midline, No JVD   Respiratory: Rales noted bilaterally   CV: RRR, S1S2, no murmurs        [x] Sternal dressing, [x] Mediastinal CT x2, [x] CATE Drain        [x] Sinus rhythm, [x] Temporary Pacing - VVI 50  Abdominal: Soft, NT, ND +BS,   Extremities: 1-2+ pedal edema noted, + peripheral pulses   Skin: No Rashes, Hematoma, Ecchymosis                              ============================I/O===========================   I&O's Detail    13 May 2023 07:01  -  14 May 2023 07:00  --------------------------------------------------------  IN:    IV PiggyBack: 200 mL    IV PiggyBack: 120 mL    NiCARdipine: 240 mL    Oral Fluid: 650 mL    sodium chloride 0.9%: 630 mL  Total IN: 1840 mL    OUT:    Bulb (mL): 20 mL    Chest Tube (mL): 185 mL    Chest Tube (mL): 73 mL    Indwelling Catheter - Urethral (mL): 1835 mL    Insulin: 0 mL  Total OUT: 2113 mL    Total NET: -273 mL      14 May 2023 07:01  -  14 May 2023 08:49  --------------------------------------------------------  IN:    IV PiggyBack: 10 mL  Total IN: 10 mL    OUT:    Chest Tube (mL): 0 mL    Chest Tube (mL): 0 mL    Voided (mL): 150 mL  Total OUT: 150 mL    Total NET: -140 mL        ============================ LABS =========================                        9.3    23.17 )-----------( 196      ( 14 May 2023 00:30 )             28.5     05-14    133<L>  |  98  |  15  ----------------------------<  135<H>  4.8   |  24  |  0.83    Ca    8.7      14 May 2023 00:28  Phos  2.8     05-14  Mg     2.2     05-14    TPro  7.2  /  Alb  4.2  /  TBili  0.7  /  DBili  x   /  AST  52<H>  /  ALT  21  /  AlkPhos  40  05-13    LIVER FUNCTIONS - ( 13 May 2023 00:19 )  Alb: 4.2 g/dL / Pro: 7.2 g/dL / ALK PHOS: 40 U/L / ALT: 21 U/L / AST: 52 U/L / GGT: x           PT/INR - ( 14 May 2023 00:30 )   PT: 16.5 sec;   INR: 1.42 ratio           ABG - ( 14 May 2023 00:03 )  pH, Arterial: 7.41  pH, Blood: x     /  pCO2: 43    /  pO2: 193   / HCO3: 27    / Base Excess: 2.3   /  SaO2: 99.7                ======================Micro/Rad/Cardio=================  Culture: Reviewed   CXR: Reviewed  Echo: Reviewed  ======================================================  PAST MEDICAL & SURGICAL HISTORY:  HTN (hypertension)      HLD (hyperlipidemia)      DM (diabetes mellitus)          ======================================================    Assessment:  37M, PmHx DM, HTN, HLD, "heart murmur secondary to an infection in my blood (3/2023),"    Severe aortic regurgitation s/p Aortic valve replacement  Hemodynamic instability  Hypovolemia  Post op respiratory insufficiency  Acute blood loss anemia    ======================================================  Plan:   ***Neuro***  [x] Nonfocal   Post operative neuro assessment   Tylenol, Gabapentin, and PRN Dilaudid for pain management.   PRN Nalbuphine for pruritus    ***Cardiovascular***  Invasive hemodynamic monitoring, assess perfusion indices   SR / CVP 7 / MAP 99 / Hct 28.5%/ Lactate 1.1  Continuous reassessment of hemodynamics   Rate control with Amiodarone  BP management with Lopressor and Norvasc  VTE prophylaxis with SQ Heparin  Monitor chest tube outputs  [x]  ASA  Serial EKG and cardiac enzymes     ***Pulmonary***  [x] NC 1L  Post op vent management   Follow SpO2, CXR, blood gasses   Encourage incentive spirometry, continue pulse ox monitoring, follow ABGs               ***GI***  [x]  Diet:  Regular   [x] Protonix   Bowel regimen with Miralax and Senna  PRN Ondansetron for Nausea  PRN Simethicone for gas    ***Renal***  Continue to monitor I/Os, BUN/Creatinine.   Replete lytes PRN  Aashish present    ***ID***  Cefuroxime for perioperative antibiotic coverage     ***Endocrine***  [x]  DM2 : HbA1c 5.3%                - [x] ISS             - Need tight glycemic control to prevent wound infection.        Patient requires continuous monitoring with bedside rhythm monitoring, arterial line, pulse oximetry, ventilator monitoring; intermittent blood gas analysis. Care plan discussed with ICU care team. Patient remains critical; required more than usual ICU care.    patient remain critical; required more than usual post op care; I have spent 35 minutes providing non routine post op care, revaluated multiple times during the day .     Care Plan discussed with the ICU care team. Patient remained critical, at risk for life threatening decompensation.     By signing my name below, I, Jes Moore, attest that this documentation has been prepared under the direction and in the presence of Wei Zamudio MD.  Electronically signed: Trell Beltran, 05-14-23 @ 08:49    I, Robb Carvajal, personally performed the services described in this documentation. all medical record entries made by the scribe were at my direction and in my presence. I have reviewed the chart and agree that the record reflects my personal performance and is accurate and complete  Electronically signed: Wei Zamudio MD. Patient seen and examined at the bedside.    Remained critically ill on continuous ICU monitoring.    OBJECTIVE:  Vital Signs Last 24 Hrs  T(C): 36.4 (14 May 2023 08:00), Max: 37.7 (14 May 2023 04:00)  T(F): 97.5 (14 May 2023 08:00), Max: 99.9 (14 May 2023 04:00)  HR: 75 (14 May 2023 08:00) (72 - 88)  BP: 126/76 (14 May 2023 08:00) (126/76 - 144/88)  BP(mean): 96 (14 May 2023 08:00) (92 - 110)  RR: 21 (14 May 2023 08:00) (2 - 31)  SpO2: 99% (14 May 2023 08:00) (98% - 100%)    Parameters below as of 14 May 2023 08:00  Patient On (Oxygen Delivery Method): room air          Physical Exam:   General: no new complaint  Neurology: nonfocal  Eyes: bilateral pupils equal and reactive   ENT/Neck: Neck supple, trachea midline, No JVD   Respiratory: Rales noted bilaterally   CV: RRR, S1S2, no murmurs        [x] Sternal dressing, [x] Mediastinal CT x2, [x] CATE Drain        [x] Sinus rhythm, [x] Temporary Pacing - VVI 50  Abdominal: Soft, NT, ND +BS,   Extremities: 1-2+ pedal edema noted, + peripheral pulses   Skin: No Rashes, Hematoma, Ecchymosis                              ============================I/O===========================   I&O's Detail    13 May 2023 07:01  -  14 May 2023 07:00  --------------------------------------------------------  IN:    IV PiggyBack: 200 mL    IV PiggyBack: 120 mL    NiCARdipine: 240 mL    Oral Fluid: 650 mL    sodium chloride 0.9%: 630 mL  Total IN: 1840 mL    OUT:    Bulb (mL): 20 mL    Chest Tube (mL): 185 mL    Chest Tube (mL): 73 mL    Indwelling Catheter - Urethral (mL): 1835 mL    Insulin: 0 mL  Total OUT: 2113 mL    Total NET: -273 mL      14 May 2023 07:01  -  14 May 2023 08:49  --------------------------------------------------------  IN:    IV PiggyBack: 10 mL  Total IN: 10 mL    OUT:    Chest Tube (mL): 0 mL    Chest Tube (mL): 0 mL    Voided (mL): 150 mL  Total OUT: 150 mL    Total NET: -140 mL        ============================ LABS =========================                        9.3    23.17 )-----------( 196      ( 14 May 2023 00:30 )             28.5     05-14    133<L>  |  98  |  15  ----------------------------<  135<H>  4.8   |  24  |  0.83    Ca    8.7      14 May 2023 00:28  Phos  2.8     05-14  Mg     2.2     05-14    TPro  7.2  /  Alb  4.2  /  TBili  0.7  /  DBili  x   /  AST  52<H>  /  ALT  21  /  AlkPhos  40  05-13    LIVER FUNCTIONS - ( 13 May 2023 00:19 )  Alb: 4.2 g/dL / Pro: 7.2 g/dL / ALK PHOS: 40 U/L / ALT: 21 U/L / AST: 52 U/L / GGT: x           PT/INR - ( 14 May 2023 00:30 )   PT: 16.5 sec;   INR: 1.42 ratio           ABG - ( 14 May 2023 00:03 )  pH, Arterial: 7.41  pH, Blood: x     /  pCO2: 43    /  pO2: 193   / HCO3: 27    / Base Excess: 2.3   /  SaO2: 99.7                ======================Micro/Rad/Cardio=================  Culture: Reviewed   CXR: Reviewed  Echo: Reviewed  ======================================================  PAST MEDICAL & SURGICAL HISTORY:  HTN (hypertension)  HLD (hyperlipidemia)  DM (diabetes mellitus)    ======================================================    Assessment:  37M, PmHx DM, HTN, HLD, "heart murmur secondary to an infection in my blood (3/2023),"    5/12/2023 Severe aortic regurgitation s/p Aortic valve replacement ( mechanical aortic valve)  HTN (hypertension)  HLD (hyperlipidemia)  DM (diabetes mellitus)  Acute blood loss anemia    ======================================================  Plan:   ***Neuro***  [x] Nonfocal   Post operative neuro assessment   Tylenol, Gabapentin, and PRN Dilaudid for pain management.   PRN Nalbuphine for pruritus    ***Cardiovascular***  Invasive hemodynamic monitoring, assess perfusion indices   SR / CVP 7 / MAP 99 / Hct 28.5%/ Lactate 1.1  Continuous reassessment of hemodynamics   Rate control with Amiodarone  BP management with Lopressor and Norvasc  VTE prophylaxis with SQ Heparin  Monitor chest tube outputs  [x]  ASA  Serial EKG and cardiac enzymes     ***Pulmonary***  [x] NC 1L  Follow SpO2, CXR, blood gasses   Encourage incentive spirometry, continue pulse ox monitoring, follow ABGs               ***GI***  [x]  Diet:  Regular   [x] Protonix   Bowel regimen with Miralax and Senna  PRN Ondansetron for Nausea  PRN Simethicone for gas    ***Renal***  Continue to monitor I/Os, BUN/Creatinine.   Replete lytes PRN  Zendejas present    ***ID***  Cefuroxime for perioperative antibiotic coverage     ***Endocrine***  [x]  DM2 : HbA1c 5.3%                - [x] ISS             - Need tight glycemic control to prevent wound infection.      started on lopressor , d/c cardene,   anticoagulation eith coumadin,  transfer to floor telemetry    Care Plan discussed with the ICU care team.    By signing my name below, I, Jes Moore, attest that this documentation has been prepared under the direction and in the presence of Wei Zamudio MD.  Electronically signed: Trell Beltran, 05-14-23 @ 08:49    Robb CRUZ, personally performed the services described in this documentation. all medical record entries made by the scribe were at my direction and in my presence. I have reviewed the chart and agree that the record reflects my personal performance and is accurate and complete  Electronically signed: Wei Zamudio MD.

## 2023-05-14 NOTE — PROGRESS NOTE ADULT - SUBJECTIVE AND OBJECTIVE BOX
Subjective: "I feel ok.  I am burping a lot "  Patient denies chest pain/SOB.  Good pain control on PCA pump    TELEMETRY: NSR 70s    VITAL SIGNS    Vital Signs Last 24 Hrs  T(C): 36.7 (23 @ 13:36), Max: 37.7 (23 @ 04:00)  T(F): 98 (23 @ 13:36), Max: 99.9 (23 @ 04:00)  HR: 71 (23 @ 13:36) (71 - 88)  BP: 133/85 (23 @ 13:36) (126/76 - 145/85)  RR: 19 (23 @ 13:36) (6 - 35)  SpO2: 96% (23 @ 13:36) (96% - 100%)             @ 07:01  -   @ 07:00  --------------------------------------------------------  IN: 1840 mL / OUT: 2113 mL / NET: -273 mL     @ 07:01  -   @ 14:06  --------------------------------------------------------  IN: 640 mL / OUT: 420 mL / NET: 220 mL       Daily     Daily Weight in k.3 (14 May 2023 00:00)  Admit Wt: Drug Dosing Weight  Height (cm): 167.6 (11 May 2023 18:13)  Weight (kg): 80.5 (11 May 2023 18:13)  BMI (kg/m2): 28.7 (11 May 2023 18:13)  BSA (m2): 1.9 (11 May 2023 18:13)      CAPILLARY BLOOD GLUCOSE  141 (13 May 2023 22:00)  130 (13 May 2023 16:00)      POCT Blood Glucose.: 119 mg/dL (14 May 2023 11:14)  POCT Blood Glucose.: 126 mg/dL (14 May 2023 06:53)  POCT Blood Glucose.: 141 mg/dL (13 May 2023 22:18)  POCT Blood Glucose.: 130 mg/dL (13 May 2023 16:41)              PHYSICAL EXAM    Neurology: alert and oriented x 3, nonfocal, no gross deficits  CV : RRR +S1/S2  Sternal Wound :  CDI with dressing , Stable  Lungs: CTA BL. RR easy, unlabored   Abdomen: soft, nontender, nondistended, positive bowel sounds, no bowel movement post op +belching   Neg N/V/D   :  pt voiding without difficulty   Extremities:  Able to [place]; No peripheral edema, neg calf tenderness.   PPP bilaterally      PW: + V Wires VVI 50/10    Chest tubes: 1 Mediastinal chest tube to suction draining small amount of serosang fluid                     Mediastinal CATE to self suction, scant amount of serosang drainage noted         acetaminophen     Tablet .. 650 milliGRAM(s) Oral every 6 hours  aMIOdarone    Tablet 400 milliGRAM(s) Oral two times a day  amLODIPine   Tablet 5 milliGRAM(s) Oral daily  ascorbic acid 500 milliGRAM(s) Oral two times a day  aspirin enteric coated 325 milliGRAM(s) Oral daily  bisacodyl Suppository 10 milliGRAM(s) Rectal once  chlorhexidine 2% Cloths 1 Application(s) Topical daily  dextrose 50% Injectable 50 milliLiter(s) IV Push every 15 minutes  gabapentin 100 milliGRAM(s) Oral every 8 hours  heparin   Injectable 5000 Unit(s) SubCutaneous every 8 hours  HYDROmorphone  Injectable 0.5 milliGRAM(s) IV Push every 6 hours PRN  HYDROmorphone PCA (1 mG/mL) 30 milliLiter(s) PCA Continuous PCA Continuous  HYDROmorphone PCA (1 mG/mL) Rescue Clinician Bolus 0.5 milliGRAM(s) IV Push every 15 minutes PRN  insulin lispro (ADMELOG) corrective regimen sliding scale   SubCutaneous Before meals and at bedtime  iron sucrose IVPB 200 milliGRAM(s) IV Intermittent every 24 hours  metoprolol tartrate 25 milliGRAM(s) Oral every 8 hours  nalbuphine Injectable 2.5 milliGRAM(s) IV Push every 6 hours PRN  naloxone Injectable 0.1 milliGRAM(s) IV Push every 3 minutes PRN  ondansetron Injectable 4 milliGRAM(s) IV Push every 6 hours PRN  pantoprazole  Injectable 40 milliGRAM(s) IV Push daily  polyethylene glycol 3350 17 Gram(s) Oral daily  senna 2 Tablet(s) Oral at bedtime  simethicone 80 milliGRAM(s) Chew every 4 hours PRN  sodium chloride 0.9% lock flush 3 milliLiter(s) IV Push every 8 hours  warfarin 5 milliGRAM(s) Oral once

## 2023-05-14 NOTE — PROGRESS NOTE ADULT - SUBJECTIVE AND OBJECTIVE BOX
Cardiovascular Disease Progress Note  Date of Service: 23 @ 10:28    Overnight events: No acute events overnight.    Patient says the pain is controlled.   Otherwise review of systems negative    Objective Findings:  T(C): 36.4 (23 @ 08:00), Max: 37.7 (23 @ 04:00)  HR: 73 (23 @ 10:00) (72 - 88)  BP: 129/78 (23 @ 10:00) (126/76 - 144/88)  RR: 21 (23 @ 10:00) (6 - 31)  SpO2: 98% (23 @ 10:00) (98% - 100%)  Wt(kg): --  Daily     Daily Weight in k.3 (14 May 2023 00:00)      Physical Exam:  Gen: NAD; Patient resting comfortably  HEENT: EOMI, Normocephalic/ atraumatic  CV: RRR, normal S1 + S2, no m/r/g  Lungs:  Normal respiratory effort; decreased air entry to auscultation bilaterally  Abd: soft, non-tender; bowel sounds present  Ext: No edema; warm and well perfused    Telemetry: Sinus    Laboratory Data:                        9.3    23.17 )-----------( 196      ( 14 May 2023 00:30 )             28.5         133<L>  |  98  |  15  ----------------------------<  135<H>  4.8   |  24  |  0.83    Ca    8.7      14 May 2023 00:28  Phos  2.8       Mg     2.2         TPro  7.2  /  Alb  4.2  /  TBili  0.7  /  DBili  x   /  AST  52<H>  /  ALT  21  /  AlkPhos  40  05-    PT/INR - ( 14 May 2023 00:30 )   PT: 16.5 sec;   INR: 1.42 ratio                   Inpatient Medications:  MEDICATIONS  (STANDING):  acetaminophen     Tablet .. 650 milliGRAM(s) Oral every 6 hours  aMIOdarone    Tablet 400 milliGRAM(s) Oral two times a day  amLODIPine   Tablet 5 milliGRAM(s) Oral daily  ascorbic acid 500 milliGRAM(s) Oral two times a day  aspirin enteric coated 325 milliGRAM(s) Oral daily  bisacodyl Suppository 10 milliGRAM(s) Rectal once  chlorhexidine 2% Cloths 1 Application(s) Topical daily  dextrose 50% Injectable 50 milliLiter(s) IV Push every 15 minutes  gabapentin 100 milliGRAM(s) Oral every 8 hours  heparin   Injectable 5000 Unit(s) SubCutaneous every 8 hours  HYDROmorphone PCA (1 mG/mL) 30 milliLiter(s) PCA Continuous PCA Continuous  insulin lispro (ADMELOG) corrective regimen sliding scale   SubCutaneous Before meals and at bedtime  metoprolol tartrate 25 milliGRAM(s) Oral every 8 hours  pantoprazole  Injectable 40 milliGRAM(s) IV Push daily  polyethylene glycol 3350 17 Gram(s) Oral daily  senna 2 Tablet(s) Oral at bedtime      Assessment:37 year old man with HTN and HLD transferred for severe aortic regurgitation in the setting of bacterial endocarditis.     Plan of Care:    #Severe aortic regurgitation-  Prolapsed RCC in the setting of strep bacterial endocarditis, as per [facility].  BCx negative on this admission.  S/p mechanical AVR on 2023.   Patient well perfused and lactate WNL off of inotropic support.  ID input noted.      Patient will need to establish a PMD for outpatient INR monitoring.     Critically ill patient.  Rest of care as per CTICU.         Over 35 minutes spent on total encounter; more than 50% of the visit was spent counseling and/or coordinating care by the attending physician.      Asad Cutler MD [facility]  Cardiovascular Disease  [phone]

## 2023-05-15 LAB
ALBUMIN SERPL ELPH-MCNC: 3.8 G/DL — SIGNIFICANT CHANGE UP (ref 3.3–5)
ALP SERPL-CCNC: 45 U/L — SIGNIFICANT CHANGE UP (ref 40–120)
ALT FLD-CCNC: 24 U/L — SIGNIFICANT CHANGE UP (ref 10–45)
ANION GAP SERPL CALC-SCNC: 12 MMOL/L — SIGNIFICANT CHANGE UP (ref 5–17)
APTT BLD: 31.2 SEC — SIGNIFICANT CHANGE UP (ref 27.5–35.5)
AST SERPL-CCNC: 33 U/L — SIGNIFICANT CHANGE UP (ref 10–40)
BASOPHILS # BLD AUTO: 0.03 K/UL — SIGNIFICANT CHANGE UP (ref 0–0.2)
BASOPHILS NFR BLD AUTO: 0.2 % — SIGNIFICANT CHANGE UP (ref 0–2)
BILIRUB SERPL-MCNC: 0.4 MG/DL — SIGNIFICANT CHANGE UP (ref 0.2–1.2)
BUN SERPL-MCNC: 18 MG/DL — SIGNIFICANT CHANGE UP (ref 7–23)
CALCIUM SERPL-MCNC: 8.8 MG/DL — SIGNIFICANT CHANGE UP (ref 8.4–10.5)
CHLORIDE SERPL-SCNC: 99 MMOL/L — SIGNIFICANT CHANGE UP (ref 96–108)
CO2 SERPL-SCNC: 26 MMOL/L — SIGNIFICANT CHANGE UP (ref 22–31)
CREAT SERPL-MCNC: 0.94 MG/DL — SIGNIFICANT CHANGE UP (ref 0.5–1.3)
CULTURE RESULTS: SIGNIFICANT CHANGE UP
CULTURE RESULTS: SIGNIFICANT CHANGE UP
EGFR: 106 ML/MIN/1.73M2 — SIGNIFICANT CHANGE UP
EOSINOPHIL # BLD AUTO: 0 K/UL — SIGNIFICANT CHANGE UP (ref 0–0.5)
EOSINOPHIL NFR BLD AUTO: 0 % — SIGNIFICANT CHANGE UP (ref 0–6)
GLUCOSE BLDC GLUCOMTR-MCNC: 103 MG/DL — HIGH (ref 70–99)
GLUCOSE BLDC GLUCOMTR-MCNC: 99 MG/DL — SIGNIFICANT CHANGE UP (ref 70–99)
GLUCOSE SERPL-MCNC: 109 MG/DL — HIGH (ref 70–99)
HCT VFR BLD CALC: 30 % — LOW (ref 39–50)
HGB BLD-MCNC: 9.1 G/DL — LOW (ref 13–17)
IMM GRANULOCYTES NFR BLD AUTO: 0.8 % — SIGNIFICANT CHANGE UP (ref 0–0.9)
INR BLD: 1.91 RATIO — HIGH (ref 0.88–1.16)
LYMPHOCYTES # BLD AUTO: 15.1 % — SIGNIFICANT CHANGE UP (ref 13–44)
LYMPHOCYTES # BLD AUTO: 2.52 K/UL — SIGNIFICANT CHANGE UP (ref 1–3.3)
MAGNESIUM SERPL-MCNC: 2.2 MG/DL — SIGNIFICANT CHANGE UP (ref 1.6–2.6)
MCHC RBC-ENTMCNC: 26.6 PG — LOW (ref 27–34)
MCHC RBC-ENTMCNC: 30.3 GM/DL — LOW (ref 32–36)
MCV RBC AUTO: 87.7 FL — SIGNIFICANT CHANGE UP (ref 80–100)
MONOCYTES # BLD AUTO: 1.31 K/UL — HIGH (ref 0–0.9)
MONOCYTES NFR BLD AUTO: 7.8 % — SIGNIFICANT CHANGE UP (ref 2–14)
NEUTROPHILS # BLD AUTO: 12.75 K/UL — HIGH (ref 1.8–7.4)
NEUTROPHILS NFR BLD AUTO: 76.1 % — SIGNIFICANT CHANGE UP (ref 43–77)
NRBC # BLD: 0 /100 WBCS — SIGNIFICANT CHANGE UP (ref 0–0)
PHOSPHATE SERPL-MCNC: 2 MG/DL — LOW (ref 2.5–4.5)
PLATELET # BLD AUTO: 242 K/UL — SIGNIFICANT CHANGE UP (ref 150–400)
POTASSIUM SERPL-MCNC: 3.8 MMOL/L — SIGNIFICANT CHANGE UP (ref 3.5–5.3)
POTASSIUM SERPL-SCNC: 3.8 MMOL/L — SIGNIFICANT CHANGE UP (ref 3.5–5.3)
PROT SERPL-MCNC: 6.9 G/DL — SIGNIFICANT CHANGE UP (ref 6–8.3)
PROTHROM AB SERPL-ACNC: 22.3 SEC — HIGH (ref 10.5–13.4)
RBC # BLD: 3.42 M/UL — LOW (ref 4.2–5.8)
RBC # FLD: 14.9 % — HIGH (ref 10.3–14.5)
SODIUM SERPL-SCNC: 137 MMOL/L — SIGNIFICANT CHANGE UP (ref 135–145)
SPECIMEN SOURCE: SIGNIFICANT CHANGE UP
SPECIMEN SOURCE: SIGNIFICANT CHANGE UP
WBC # BLD: 16.74 K/UL — HIGH (ref 3.8–10.5)
WBC # FLD AUTO: 16.74 K/UL — HIGH (ref 3.8–10.5)

## 2023-05-15 PROCEDURE — 99232 SBSQ HOSP IP/OBS MODERATE 35: CPT

## 2023-05-15 PROCEDURE — 71045 X-RAY EXAM CHEST 1 VIEW: CPT | Mod: 26

## 2023-05-15 RX ORDER — POTASSIUM CHLORIDE 20 MEQ
20 PACKET (EA) ORAL ONCE
Refills: 0 | Status: COMPLETED | OUTPATIENT
Start: 2023-05-15 | End: 2023-05-15

## 2023-05-15 RX ORDER — OXYCODONE HYDROCHLORIDE 5 MG/1
10 TABLET ORAL EVERY 4 HOURS
Refills: 0 | Status: DISCONTINUED | OUTPATIENT
Start: 2023-05-15 | End: 2023-05-18

## 2023-05-15 RX ORDER — WARFARIN SODIUM 2.5 MG/1
2.5 TABLET ORAL ONCE
Refills: 0 | Status: COMPLETED | OUTPATIENT
Start: 2023-05-15 | End: 2023-05-15

## 2023-05-15 RX ORDER — OXYCODONE HYDROCHLORIDE 5 MG/1
5 TABLET ORAL
Refills: 0 | Status: DISCONTINUED | OUTPATIENT
Start: 2023-05-15 | End: 2023-05-18

## 2023-05-15 RX ADMIN — AMIODARONE HYDROCHLORIDE 400 MILLIGRAM(S): 400 TABLET ORAL at 05:27

## 2023-05-15 RX ADMIN — IRON SUCROSE 110 MILLIGRAM(S): 20 INJECTION, SOLUTION INTRAVENOUS at 13:16

## 2023-05-15 RX ADMIN — Medication 20 MILLIEQUIVALENT(S): at 13:16

## 2023-05-15 RX ADMIN — AMLODIPINE BESYLATE 5 MILLIGRAM(S): 2.5 TABLET ORAL at 05:28

## 2023-05-15 RX ADMIN — GABAPENTIN 100 MILLIGRAM(S): 400 CAPSULE ORAL at 13:16

## 2023-05-15 RX ADMIN — Medication 81 MILLIGRAM(S): at 12:52

## 2023-05-15 RX ADMIN — Medication 500 MILLIGRAM(S): at 18:37

## 2023-05-15 RX ADMIN — HEPARIN SODIUM 5000 UNIT(S): 5000 INJECTION INTRAVENOUS; SUBCUTANEOUS at 05:27

## 2023-05-15 RX ADMIN — HYDROMORPHONE HYDROCHLORIDE 30 MILLILITER(S): 2 INJECTION INTRAMUSCULAR; INTRAVENOUS; SUBCUTANEOUS at 07:19

## 2023-05-15 RX ADMIN — Medication 25 MILLIGRAM(S): at 22:07

## 2023-05-15 RX ADMIN — WARFARIN SODIUM 2.5 MILLIGRAM(S): 2.5 TABLET ORAL at 22:08

## 2023-05-15 RX ADMIN — PANTOPRAZOLE SODIUM 40 MILLIGRAM(S): 20 TABLET, DELAYED RELEASE ORAL at 05:28

## 2023-05-15 RX ADMIN — GABAPENTIN 100 MILLIGRAM(S): 400 CAPSULE ORAL at 05:28

## 2023-05-15 RX ADMIN — SENNA PLUS 2 TABLET(S): 8.6 TABLET ORAL at 20:35

## 2023-05-15 RX ADMIN — Medication 25 MILLIGRAM(S): at 13:16

## 2023-05-15 RX ADMIN — HEPARIN SODIUM 5000 UNIT(S): 5000 INJECTION INTRAVENOUS; SUBCUTANEOUS at 22:07

## 2023-05-15 RX ADMIN — GABAPENTIN 100 MILLIGRAM(S): 400 CAPSULE ORAL at 22:07

## 2023-05-15 RX ADMIN — HEPARIN SODIUM 5000 UNIT(S): 5000 INJECTION INTRAVENOUS; SUBCUTANEOUS at 13:16

## 2023-05-15 RX ADMIN — POLYETHYLENE GLYCOL 3350 17 GRAM(S): 17 POWDER, FOR SOLUTION ORAL at 12:52

## 2023-05-15 RX ADMIN — OXYCODONE HYDROCHLORIDE 10 MILLIGRAM(S): 5 TABLET ORAL at 21:05

## 2023-05-15 RX ADMIN — Medication 650 MILLIGRAM(S): at 12:52

## 2023-05-15 RX ADMIN — SODIUM CHLORIDE 3 MILLILITER(S): 9 INJECTION INTRAMUSCULAR; INTRAVENOUS; SUBCUTANEOUS at 05:37

## 2023-05-15 RX ADMIN — Medication 650 MILLIGRAM(S): at 05:28

## 2023-05-15 RX ADMIN — Medication 650 MILLIGRAM(S): at 12:54

## 2023-05-15 RX ADMIN — SODIUM CHLORIDE 3 MILLILITER(S): 9 INJECTION INTRAMUSCULAR; INTRAVENOUS; SUBCUTANEOUS at 22:04

## 2023-05-15 RX ADMIN — SODIUM CHLORIDE 3 MILLILITER(S): 9 INJECTION INTRAMUSCULAR; INTRAVENOUS; SUBCUTANEOUS at 13:21

## 2023-05-15 RX ADMIN — OXYCODONE HYDROCHLORIDE 10 MILLIGRAM(S): 5 TABLET ORAL at 20:35

## 2023-05-15 RX ADMIN — Medication 650 MILLIGRAM(S): at 05:40

## 2023-05-15 RX ADMIN — Medication 25 MILLIGRAM(S): at 05:29

## 2023-05-15 RX ADMIN — Medication 500 MILLIGRAM(S): at 05:28

## 2023-05-15 RX ADMIN — CHLORHEXIDINE GLUCONATE 1 APPLICATION(S): 213 SOLUTION TOPICAL at 12:47

## 2023-05-15 NOTE — OCCUPATIONAL THERAPY INITIAL EVALUATION ADULT - PERTINENT HX OF CURRENT PROBLEM, REHAB EVAL
37M PMHx: DM, HTN, HLD, heart murmur secondary to an infection in my blood (3/2023), now presents to [facility] from [facility], for Severe AI/endocarditis eval. Patient initially presented to [facility] ER for evaluation of cough and shortness of breath x 4 days. Patient states that over the course the last 4 days he has been experiencing shortness of breath as well as cough productive of white to yellow sputum, and states that there was a small blood streak in the sputum today. Patient states that he has been experiencing chest discomfort for the last 2 days as well, and points to the epigastric and left side of the chest when asked where the pain is localized. Patient states that the shortness of breath is worse when laying down, and states he has never had this before. PE ruled out at [facility] and Negative troponin Of note Patient states he has step bacteremia in [facility] in March 2023 and was send for on antibiotics which he completed in April 2023.  Found to have severe AI, transferred to [facility] for cardiac surgery evaluation with Dr. Chavez. 5/10 ID consult called and appreciated for r/o encarditis (Dr. Curtis). Dental Consult called and appreciated to r/o potential source. BC x 2 results pending. CTA head ordered to r/o mycotic aneurysm. TTE ordered.  CT of heart with cors ordered for pre op cardiac surgery work up. s/p OR for Mechanical AVR on 5/12.

## 2023-05-15 NOTE — OCCUPATIONAL THERAPY INITIAL EVALUATION ADULT - BALANCE TRAINING, PT EVAL
Pt will improve dynamic standing balance by 1/2 grade to improve functional performance with ADLs within 2 weeks.

## 2023-05-15 NOTE — PROGRESS NOTE ADULT - PROBLEM SELECTOR PLAN 1
s/p AVR Mechanical on 5/12 /w Dr. Chavez  Continue AC on Coumadin, check daily PT/INR  Continue ASA 81mg QD and Lopressor 25mg Q8H  Titrate up beta-blocker as tolerated  Cough and deep breathe, Incentive Spirometry Q1h, Chest PT.  Ambulate 4x daily as tolerated and with PT.   C/W GI prophylaxis on Protonix and DVT prophylaxis on SQ Heparin.  Disposition: Home when INR therapeutic

## 2023-05-15 NOTE — PROGRESS NOTE ADULT - SUBJECTIVE AND OBJECTIVE BOX
Cardiovascular Disease Progress Note  Date of Service: 05-15-23 @ 08:40    Overnight events: No acute events overnight.    Patient says the pain is controlled.   Otherwise review of systems negative    Objective Findings:  T(C): 37 (05-15-23 @ 08:00), Max: 37.3 (05-15-23 @ 04:40)  HR: 67 (05-15-23 @ 08:00) (65 - 85)  BP: 146/81 (05-15-23 @ 08:00) (127/75 - 148/88)  RR: 18 (05-15-23 @ 08:00) (18 - 35)  SpO2: 98% (05-15-23 @ 08:00) (95% - 100%)  Wt(kg): --  Daily     Daily Weight in k.6 (15 May 2023 08:08)      Physical Exam:  Gen: NAD; Patient resting comfortably  HEENT: EOMI, Normocephalic/ atraumatic  CV: RRR, mechanical S2  Lungs:  Normal respiratory effort; decreased air entry to auscultation bilaterally  Abd: soft, non-tender; bowel sounds present  Ext: No edema; warm and well perfused    Telemetry: Sinus    Laboratory Data:                        9.3    23.17 )-----------( 196      ( 14 May 2023 00:30 )             28.5     05-14    133<L>  |  98  |  15  ----------------------------<  135<H>  4.8   |  24  |  0.83    Ca    8.7      14 May 2023 00:28  Phos  2.8     -  Mg     2.2     05-14      PT/INR - ( 14 May 2023 00:30 )   PT: 16.5 sec;   INR: 1.42 ratio                   Inpatient Medications:  MEDICATIONS  (STANDING):  acetaminophen     Tablet .. 650 milliGRAM(s) Oral every 6 hours  amLODIPine   Tablet 5 milliGRAM(s) Oral daily  ascorbic acid 500 milliGRAM(s) Oral two times a day  aspirin enteric coated 81 milliGRAM(s) Oral daily  bisacodyl Suppository 10 milliGRAM(s) Rectal once  chlorhexidine 2% Cloths 1 Application(s) Topical daily  gabapentin 100 milliGRAM(s) Oral every 8 hours  heparin   Injectable 5000 Unit(s) SubCutaneous every 8 hours  HYDROmorphone PCA (1 mG/mL) 30 milliLiter(s) PCA Continuous PCA Continuous  iron sucrose IVPB 200 milliGRAM(s) IV Intermittent every 24 hours  metoprolol tartrate 25 milliGRAM(s) Oral every 8 hours  pantoprazole    Tablet 40 milliGRAM(s) Oral before breakfast  polyethylene glycol 3350 17 Gram(s) Oral daily  senna 2 Tablet(s) Oral at bedtime  sodium chloride 0.9% lock flush 3 milliLiter(s) IV Push every 8 hours      Assessment: 37 year old man with HTN and HLD transferred for severe aortic regurgitation in the setting of bacterial endocarditis.     Plan of Care:    #Severe aortic regurgitation-  Prolapsed RCC in the setting of strep bacterial endocarditis, as per [facility].  BCx negative on this admission.  S/p mechanical AVR on 2023.   Patient well perfused and lactate WNL off of inotropic support.  ID input noted.  Warfarin dosing as per the CT surgery team.     Patient will need to establish a PMD for outpatient INR monitoring.         Over 35 minutes spent on total encounter; more than 50% of the visit was spent counseling and/or coordinating care by the attending physician.      Asad Cutler MD [facility]  Cardiovascular Disease  [phone]

## 2023-05-15 NOTE — PROGRESS NOTE ADULT - SUBJECTIVE AND OBJECTIVE BOX
Day 3 of Anesthesia Pain Management Service    SUBJECTIVE: Doing ok    Pain Scale Score:	[X] Refer to charted pain scores    THERAPY:    [ ] IV PCA Morphine		        [ ] 5 mg/mL	[ ] 1 mg/mL  [X] IV PCA Hydromorphone	[ ] 5 mg/mL	[X] 1 mg/mL  [ ] IV PCA Fentanyl		        [ ] 50 micrograms/mL    Demand dose: 0.3 mg     Lockout: 6 minutes   Continuous Rate: 0 mg/hr  4 Hour Limit: 5 mg    MEDICATIONS  (STANDING):  acetaminophen     Tablet .. 650 milliGRAM(s) Oral every 6 hours  amLODIPine   Tablet 5 milliGRAM(s) Oral daily  ascorbic acid 500 milliGRAM(s) Oral two times a day  aspirin enteric coated 81 milliGRAM(s) Oral daily  bisacodyl Suppository 10 milliGRAM(s) Rectal once  chlorhexidine 2% Cloths 1 Application(s) Topical daily  gabapentin 100 milliGRAM(s) Oral every 8 hours  heparin   Injectable 5000 Unit(s) SubCutaneous every 8 hours  HYDROmorphone PCA (1 mG/mL) 30 milliLiter(s) PCA Continuous PCA Continuous  iron sucrose IVPB 200 milliGRAM(s) IV Intermittent every 24 hours  metoprolol tartrate 25 milliGRAM(s) Oral every 8 hours  pantoprazole    Tablet 40 milliGRAM(s) Oral before breakfast  polyethylene glycol 3350 17 Gram(s) Oral daily  senna 2 Tablet(s) Oral at bedtime  sodium chloride 0.9% lock flush 3 milliLiter(s) IV Push every 8 hours    MEDICATIONS  (PRN):  acetaminophen Tablet 650 milliGRAM(s) Oral every 6 hours PRN Mild Pain (1 - 3)  HYDROmorphone PCA (1 mG/mL) Rescue Clinician Bolus 0.5 milliGRAM(s) IV Push every 15 minutes PRN for Pain Scale GREATER THAN 6  nalbuphine Injectable 2.5 milliGRAM(s) IV Push every 6 hours PRN Pruritus  naloxone Injectable 0.1 milliGRAM(s) IV Push every 3 minutes PRN For ANY of the following changes in patient status:  A. RR LESS THAN 10 breaths per minute, B. Oxygen saturation LESS THAN 90%, C. Sedation score of 6  ondansetron Injectable 4 milliGRAM(s) IV Push every 6 hours PRN Nausea  simethicone 80 milliGRAM(s) Chew every 4 hours PRN Gas      OBJECTIVE:    Sedation Score:	[ X] Alert	 [ ] Drowsy 	[ ] Arousable	[ ] Asleep	[ ] Unresponsive    Side Effects:	[X ] None	[ ] Nausea	[ ] Vomiting	[ ] Pruritus  		[ ] Other:    Vital Signs Last 24 Hrs  T(C): 37 (15 May 2023 08:00), Max: 37.3 (15 May 2023 04:40)  T(F): 98.6 (15 May 2023 08:00), Max: 99.2 (15 May 2023 04:40)  HR: 67 (15 May 2023 08:00) (65 - 85)  BP: 146/81 (15 May 2023 08:00) (127/75 - 148/88)  BP(mean): 102 (14 May 2023 13:00) (99 - 109)  RR: 18 (15 May 2023 08:00) (18 - 35)  SpO2: 98% (15 May 2023 08:00) (95% - 100%)    Parameters below as of 15 May 2023 08:00  Patient On (Oxygen Delivery Method): room air        ASSESSMENT/ PLAN    Therapy to  be:               [  ] Continued   [X ] Discontinued   [ X] Changed to PRN Analgesics    Documentation and Verification of current medications:   [X] Done	[ ] Not done, not eligible    Comments: Endorsing some incisional pain. +CT. OOB in chair. PCA use limited.   Will D\C PCA and transition to prn analgesics

## 2023-05-15 NOTE — PROGRESS NOTE ADULT - SUBJECTIVE AND OBJECTIVE BOX
Subjective: Pt states "Hello" denies any CP or SOB. No acute events overnight.     Telemetry:  SR 60 - 70  Vital Signs Last 24 Hrs  T(C): 37 (05-15-23 @ 08:00), Max: 37.3 (05-15-23 @ 04:40)  T(F): 98.6 (05-15-23 @ 08:00), Max: 99.2 (05-15-23 @ 04:40)  HR: 67 (05-15-23 @ 08:00) (65 - 85)  BP: 146/81 (05-15-23 @ 08:00) (127/75 - 148/88)  RR: 18 (05-15-23 @ 08:00) (18 - 34)  SpO2: 98% (05-15-23 @ 08:00) (95% - 100%)              @ 07:01  -  05-15 @ 07:00  --------------------------------------------------------  IN: 1180 mL / OUT: 1565 mL / NET: -385 mL      Daily Weight in k.6 (15 May 2023 08:08)                        9.3    23.17 )-----------( 196      ( 14 May 2023 00:30 )             28.5     133<L>  |  98  |  15  ----------------------------<  135<H>  4.8   |  24  |  0.83          CAPILLARY BLOOD GLUCOSE  99 - 108        PHYSICAL EXAM  Neurology: A&Ox3, NAD  CV : RRR+S1S2  Sternal Wound: MSI CDI JOEY, Stable  +PW - EPM VVI 50/10  Lungs: Respirations non-labored, B/L BS CTA  +mediastinal CT to LWS with serosanguinous drainage, no air leak  +phuong drain to CATE bulb self suction with serosanguinous drainage  Abdomen: Soft, NT/ND, +BSx4Q, last BM 5/15 (-)N/V/D  : Voiding without difficulty  Extremities: B/L LE no edema, negative calf tenderness, +PP            MEDICATIONS  acetaminophen     Tablet .. 650 milliGRAM(s) Oral every 6 hours  acetaminophen     Tablet .. 650 milliGRAM(s) Oral every 6 hours PRN  amLODIPine   Tablet 5 milliGRAM(s) Oral daily  ascorbic acid 500 milliGRAM(s) Oral two times a day  aspirin enteric coated 81 milliGRAM(s) Oral daily  bisacodyl Suppository 10 milliGRAM(s) Rectal once  chlorhexidine 2% Cloths 1 Application(s) Topical daily  gabapentin 100 milliGRAM(s) Oral every 8 hours  heparin   Injectable 5000 Unit(s) SubCutaneous every 8 hours  iron sucrose IVPB 200 milliGRAM(s) IV Intermittent every 24 hours  metoprolol tartrate 25 milliGRAM(s) Oral every 8 hours  nalbuphine Injectable 2.5 milliGRAM(s) IV Push every 6 hours PRN  naloxone Injectable 0.1 milliGRAM(s) IV Push every 3 minutes PRN  ondansetron Injectable 4 milliGRAM(s) IV Push every 6 hours PRN  oxyCODONE    IR 5 milliGRAM(s) Oral every 3 hours PRN  oxyCODONE    IR 10 milliGRAM(s) Oral every 4 hours PRN  pantoprazole    Tablet 40 milliGRAM(s) Oral before breakfast  polyethylene glycol 3350 17 Gram(s) Oral daily  senna 2 Tablet(s) Oral at bedtime  simethicone 80 milliGRAM(s) Chew every 4 hours PRN  sodium chloride 0.9% lock flush 3 milliLiter(s) IV Push every 8 hours      Physical Therapy Rec:   Home  [ X ]   Home w/ PT  [  ]  Rehab  [  ]    Discussed with Cardiothoracic Team at AM rounds.

## 2023-05-15 NOTE — PROGRESS NOTE ADULT - SUBJECTIVE AND OBJECTIVE BOX
Pain Management Attending Addendum    SUBJECTIVE: Patient doing well with IV PCA    Therapy:    [X] IV PCA         [ ] PRN Analgesics    OBJECTIVE:   [X] Pain appropriately controlled    [ ] Other:    Side Effects:  [X] None	             [ ] Nausea              [ ] Pruritis                	[ ] Other:    ASSESSMENT/PLAN: Continue current therapy

## 2023-05-15 NOTE — OCCUPATIONAL THERAPY INITIAL EVALUATION ADULT - LIVES WITH, PROFILE
Pt lives in apartment with mother +elevator access. Pt reports independence with all aspects of self care and functional mobility without AD PTA.

## 2023-05-15 NOTE — PROGRESS NOTE ADULT - SUBJECTIVE AND OBJECTIVE BOX
infectious diseases progress note:    Patient is a 37y old  Male who presents with a chief complaint of Endocarditis eval (15 May 2023 08:40)        Atherosclerosis of native coronary artery without angina pectoris             Allergies    No Known Allergies    Intolerances        ANTIBIOTICS/RELEVANT:  antimicrobials    immunologic:    OTHER:  acetaminophen     Tablet .. 650 milliGRAM(s) Oral every 6 hours  acetaminophen     Tablet .. 650 milliGRAM(s) Oral every 6 hours PRN  amLODIPine   Tablet 5 milliGRAM(s) Oral daily  ascorbic acid 500 milliGRAM(s) Oral two times a day  aspirin enteric coated 81 milliGRAM(s) Oral daily  bisacodyl Suppository 10 milliGRAM(s) Rectal once  chlorhexidine 2% Cloths 1 Application(s) Topical daily  gabapentin 100 milliGRAM(s) Oral every 8 hours  heparin   Injectable 5000 Unit(s) SubCutaneous every 8 hours  HYDROmorphone PCA (1 mG/mL) 30 milliLiter(s) PCA Continuous PCA Continuous  HYDROmorphone PCA (1 mG/mL) Rescue Clinician Bolus 0.5 milliGRAM(s) IV Push every 15 minutes PRN  iron sucrose IVPB 200 milliGRAM(s) IV Intermittent every 24 hours  metoprolol tartrate 25 milliGRAM(s) Oral every 8 hours  nalbuphine Injectable 2.5 milliGRAM(s) IV Push every 6 hours PRN  naloxone Injectable 0.1 milliGRAM(s) IV Push every 3 minutes PRN  ondansetron Injectable 4 milliGRAM(s) IV Push every 6 hours PRN  pantoprazole    Tablet 40 milliGRAM(s) Oral before breakfast  polyethylene glycol 3350 17 Gram(s) Oral daily  senna 2 Tablet(s) Oral at bedtime  simethicone 80 milliGRAM(s) Chew every 4 hours PRN  sodium chloride 0.9% lock flush 3 milliLiter(s) IV Push every 8 hours      Objective:  Vital Signs Last 24 Hrs  T(C): 37 (15 May 2023 08:00), Max: 37.3 (15 May 2023 04:40)  T(F): 98.6 (15 May 2023 08:00), Max: 99.2 (15 May 2023 04:40)  HR: 67 (15 May 2023 08:00) (65 - 85)  BP: 146/81 (15 May 2023 08:00) (127/75 - 148/88)  BP(mean): 102 (14 May 2023 13:00) (99 - 109)  RR: 18 (15 May 2023 08:00) (18 - 35)  SpO2: 98% (15 May 2023 08:00) (95% - 100%)    Parameters below as of 15 May 2023 08:00  Patient On (Oxygen Delivery Method): room air           Eyes:LORNE, EOMI  Ear/Nose/Throat: no oral lesion, no sinus tenderness on percussion	  Neck:no JVD, no lymphadenopathy, supple  Respiratory: CTA kay  Cardiovascular: S1S2 RRR, no murmurs  Gastrointestinal:soft, (+) BS, no HSM  Extremities:no e/e/c        LABS:                        9.3    23.17 )-----------( 196      ( 14 May 2023 00:30 )             28.5     05-14    133<L>  |  98  |  15  ----------------------------<  135<H>  4.8   |  24  |  0.83    Ca    8.7      14 May 2023 00:28  Phos  2.8     05-14  Mg     2.2     05-14      PT/INR - ( 14 May 2023 00:30 )   PT: 16.5 sec;   INR: 1.42 ratio                 MICROBIOLOGY:    RECENT CULTURES:  05-12 @ 14:57 .Tissue Other       No polymorphonuclear leukocytes seen per low power field  No organisms seen per oil power field           No growth    05-10 @ 01:13 .Blood Blood                No Growth Final          RESPIRATORY CULTURES:              RADIOLOGY & ADDITIONAL STUDIES:        Pager [phone]  After 5 pm/weekends or if no response :[phone]

## 2023-05-16 LAB
ANION GAP SERPL CALC-SCNC: 15 MMOL/L — SIGNIFICANT CHANGE UP (ref 5–17)
BASOPHILS # BLD AUTO: 0.03 K/UL — SIGNIFICANT CHANGE UP (ref 0–0.2)
BASOPHILS NFR BLD AUTO: 0.2 % — SIGNIFICANT CHANGE UP (ref 0–2)
BUN SERPL-MCNC: 15 MG/DL — SIGNIFICANT CHANGE UP (ref 7–23)
CALCIUM SERPL-MCNC: 9.4 MG/DL — SIGNIFICANT CHANGE UP (ref 8.4–10.5)
CHLORIDE SERPL-SCNC: 98 MMOL/L — SIGNIFICANT CHANGE UP (ref 96–108)
CO2 SERPL-SCNC: 25 MMOL/L — SIGNIFICANT CHANGE UP (ref 22–31)
CREAT SERPL-MCNC: 0.94 MG/DL — SIGNIFICANT CHANGE UP (ref 0.5–1.3)
EGFR: 106 ML/MIN/1.73M2 — SIGNIFICANT CHANGE UP
EOSINOPHIL # BLD AUTO: 0.02 K/UL — SIGNIFICANT CHANGE UP (ref 0–0.5)
EOSINOPHIL NFR BLD AUTO: 0.1 % — SIGNIFICANT CHANGE UP (ref 0–6)
GLUCOSE SERPL-MCNC: 80 MG/DL — SIGNIFICANT CHANGE UP (ref 70–99)
HCT VFR BLD CALC: 27.9 % — LOW (ref 39–50)
HGB BLD-MCNC: 8.9 G/DL — LOW (ref 13–17)
IMM GRANULOCYTES NFR BLD AUTO: 0.7 % — SIGNIFICANT CHANGE UP (ref 0–0.9)
INR BLD: 2.05 RATIO — HIGH (ref 0.88–1.16)
LYMPHOCYTES # BLD AUTO: 21.3 % — SIGNIFICANT CHANGE UP (ref 13–44)
LYMPHOCYTES # BLD AUTO: 3.21 K/UL — SIGNIFICANT CHANGE UP (ref 1–3.3)
MAGNESIUM SERPL-MCNC: 2.1 MG/DL — SIGNIFICANT CHANGE UP (ref 1.6–2.6)
MCHC RBC-ENTMCNC: 27.4 PG — SIGNIFICANT CHANGE UP (ref 27–34)
MCHC RBC-ENTMCNC: 31.9 GM/DL — LOW (ref 32–36)
MCV RBC AUTO: 85.8 FL — SIGNIFICANT CHANGE UP (ref 80–100)
MONOCYTES # BLD AUTO: 1.36 K/UL — HIGH (ref 0–0.9)
MONOCYTES NFR BLD AUTO: 9 % — SIGNIFICANT CHANGE UP (ref 2–14)
NEUTROPHILS # BLD AUTO: 10.36 K/UL — HIGH (ref 1.8–7.4)
NEUTROPHILS NFR BLD AUTO: 68.7 % — SIGNIFICANT CHANGE UP (ref 43–77)
NRBC # BLD: 0 /100 WBCS — SIGNIFICANT CHANGE UP (ref 0–0)
PLATELET # BLD AUTO: 280 K/UL — SIGNIFICANT CHANGE UP (ref 150–400)
POTASSIUM SERPL-MCNC: 4 MMOL/L — SIGNIFICANT CHANGE UP (ref 3.5–5.3)
POTASSIUM SERPL-SCNC: 4 MMOL/L — SIGNIFICANT CHANGE UP (ref 3.5–5.3)
PROTHROM AB SERPL-ACNC: 23.9 SEC — HIGH (ref 10.5–13.4)
RBC # BLD: 3.25 M/UL — LOW (ref 4.2–5.8)
RBC # FLD: 14.3 % — SIGNIFICANT CHANGE UP (ref 10.3–14.5)
SODIUM SERPL-SCNC: 138 MMOL/L — SIGNIFICANT CHANGE UP (ref 135–145)
WBC # BLD: 15.09 K/UL — HIGH (ref 3.8–10.5)
WBC # FLD AUTO: 15.09 K/UL — HIGH (ref 3.8–10.5)

## 2023-05-16 PROCEDURE — 99232 SBSQ HOSP IP/OBS MODERATE 35: CPT

## 2023-05-16 PROCEDURE — 71045 X-RAY EXAM CHEST 1 VIEW: CPT | Mod: 26

## 2023-05-16 RX ORDER — WARFARIN SODIUM 2.5 MG/1
2.5 TABLET ORAL ONCE
Refills: 0 | Status: COMPLETED | OUTPATIENT
Start: 2023-05-16 | End: 2023-05-16

## 2023-05-16 RX ADMIN — IRON SUCROSE 110 MILLIGRAM(S): 20 INJECTION, SOLUTION INTRAVENOUS at 11:05

## 2023-05-16 RX ADMIN — SODIUM CHLORIDE 3 MILLILITER(S): 9 INJECTION INTRAMUSCULAR; INTRAVENOUS; SUBCUTANEOUS at 14:08

## 2023-05-16 RX ADMIN — POLYETHYLENE GLYCOL 3350 17 GRAM(S): 17 POWDER, FOR SOLUTION ORAL at 11:06

## 2023-05-16 RX ADMIN — Medication 500 MILLIGRAM(S): at 17:06

## 2023-05-16 RX ADMIN — Medication 500 MILLIGRAM(S): at 05:22

## 2023-05-16 RX ADMIN — OXYCODONE HYDROCHLORIDE 10 MILLIGRAM(S): 5 TABLET ORAL at 00:33

## 2023-05-16 RX ADMIN — Medication 25 MILLIGRAM(S): at 05:22

## 2023-05-16 RX ADMIN — HEPARIN SODIUM 5000 UNIT(S): 5000 INJECTION INTRAVENOUS; SUBCUTANEOUS at 21:49

## 2023-05-16 RX ADMIN — OXYCODONE HYDROCHLORIDE 10 MILLIGRAM(S): 5 TABLET ORAL at 14:44

## 2023-05-16 RX ADMIN — HEPARIN SODIUM 5000 UNIT(S): 5000 INJECTION INTRAVENOUS; SUBCUTANEOUS at 05:23

## 2023-05-16 RX ADMIN — HEPARIN SODIUM 5000 UNIT(S): 5000 INJECTION INTRAVENOUS; SUBCUTANEOUS at 14:00

## 2023-05-16 RX ADMIN — OXYCODONE HYDROCHLORIDE 10 MILLIGRAM(S): 5 TABLET ORAL at 01:05

## 2023-05-16 RX ADMIN — Medication 25 MILLIGRAM(S): at 21:48

## 2023-05-16 RX ADMIN — GABAPENTIN 100 MILLIGRAM(S): 400 CAPSULE ORAL at 21:48

## 2023-05-16 RX ADMIN — GABAPENTIN 100 MILLIGRAM(S): 400 CAPSULE ORAL at 14:08

## 2023-05-16 RX ADMIN — CHLORHEXIDINE GLUCONATE 1 APPLICATION(S): 213 SOLUTION TOPICAL at 11:06

## 2023-05-16 RX ADMIN — OXYCODONE HYDROCHLORIDE 10 MILLIGRAM(S): 5 TABLET ORAL at 19:22

## 2023-05-16 RX ADMIN — PANTOPRAZOLE SODIUM 40 MILLIGRAM(S): 20 TABLET, DELAYED RELEASE ORAL at 05:23

## 2023-05-16 RX ADMIN — OXYCODONE HYDROCHLORIDE 10 MILLIGRAM(S): 5 TABLET ORAL at 10:34

## 2023-05-16 RX ADMIN — Medication 25 MILLIGRAM(S): at 13:59

## 2023-05-16 RX ADMIN — GABAPENTIN 100 MILLIGRAM(S): 400 CAPSULE ORAL at 05:21

## 2023-05-16 RX ADMIN — OXYCODONE HYDROCHLORIDE 10 MILLIGRAM(S): 5 TABLET ORAL at 11:30

## 2023-05-16 RX ADMIN — WARFARIN SODIUM 2.5 MILLIGRAM(S): 2.5 TABLET ORAL at 21:49

## 2023-05-16 RX ADMIN — AMLODIPINE BESYLATE 5 MILLIGRAM(S): 2.5 TABLET ORAL at 05:21

## 2023-05-16 RX ADMIN — OXYCODONE HYDROCHLORIDE 10 MILLIGRAM(S): 5 TABLET ORAL at 05:21

## 2023-05-16 RX ADMIN — SODIUM CHLORIDE 3 MILLILITER(S): 9 INJECTION INTRAMUSCULAR; INTRAVENOUS; SUBCUTANEOUS at 05:08

## 2023-05-16 RX ADMIN — Medication 81 MILLIGRAM(S): at 13:59

## 2023-05-16 RX ADMIN — OXYCODONE HYDROCHLORIDE 10 MILLIGRAM(S): 5 TABLET ORAL at 05:58

## 2023-05-16 RX ADMIN — SODIUM CHLORIDE 3 MILLILITER(S): 9 INJECTION INTRAMUSCULAR; INTRAVENOUS; SUBCUTANEOUS at 20:30

## 2023-05-16 RX ADMIN — OXYCODONE HYDROCHLORIDE 10 MILLIGRAM(S): 5 TABLET ORAL at 15:30

## 2023-05-16 RX ADMIN — OXYCODONE HYDROCHLORIDE 10 MILLIGRAM(S): 5 TABLET ORAL at 20:22

## 2023-05-16 NOTE — PROGRESS NOTE ADULT - PROBLEM SELECTOR PLAN 1
s/p AVR Mechanical on 5/12 /w Dr. Chavez  Continue AC on Coumadin, check daily PT/INR  Coumadin 2.5 mg Po tonight   D/C PW   D/C Mike drain   Continue ASA 81mg QD and Lopressor 25mg Q8H  Titrate up beta-blocker as tolerated  Cough and deep breathe, Incentive Spirometry Q1h, Chest PT.  Ambulate 4x daily as tolerated and with PT.   C/W GI prophylaxis on Protonix and DVT prophylaxis on SQ Heparin.  Disposition: Home in am

## 2023-05-16 NOTE — PROGRESS NOTE ADULT - SUBJECTIVE AND OBJECTIVE BOX
Cardiovascular Disease Progress Note  Date of service: 05-16-23 @ 08:08    Overnight events: No acute events overnight.  Pt denies chest pain or SOB.   Otherwise review of systems negative    Objective Findings:  T(C): 37.1 (05-16-23 @ 05:29), Max: 37.7 (05-15-23 @ 18:00)  HR: 75 (05-16-23 @ 05:29) (70 - 78)  BP: 142/89 (05-16-23 @ 05:29) (133/78 - 154/99)  RR: 18 (05-16-23 @ 05:29) (18 - 18)  SpO2: 97% (05-16-23 @ 05:29) (97% - 100%)  Wt(kg): --  Daily     Daily       Physical Exam:  Gen: NAD; Patient resting comfortably  HEENT: EOMI, Normocephalic/ atraumatic  CV: RRR, normal S1 + S2, no m/r/g  Lungs:  Normal respiratory effort; clear to auscultation bilaterally  Abd: soft, non-tender; bowel sounds present  Ext: No edema; warm and well perfused    Telemetry: Sinus     Laboratory Data:                        8.9    15.09 )-----------( 280      ( 16 May 2023 06:34 )             27.9     05-16    138  |  98  |  15  ----------------------------<  80  4.0   |  25  |  0.94    Ca    9.4      16 May 2023 06:34  Phos  2.0     05-15  Mg     2.1     05-16    TPro  6.9  /  Alb  3.8  /  TBili  0.4  /  DBili  x   /  AST  33  /  ALT  24  /  AlkPhos  45  05-15    PT/INR - ( 16 May 2023 06:34 )   PT: 23.9 sec;   INR: 2.05 ratio         PTT - ( 15 May 2023 11:25 )  PTT:31.2 sec          Inpatient Medications:  MEDICATIONS  (STANDING):  amLODIPine   Tablet 5 milliGRAM(s) Oral daily  ascorbic acid 500 milliGRAM(s) Oral two times a day  aspirin enteric coated 81 milliGRAM(s) Oral daily  bisacodyl Suppository 10 milliGRAM(s) Rectal once  chlorhexidine 2% Cloths 1 Application(s) Topical daily  gabapentin 100 milliGRAM(s) Oral every 8 hours  heparin   Injectable 5000 Unit(s) SubCutaneous every 8 hours  iron sucrose IVPB 200 milliGRAM(s) IV Intermittent every 24 hours  metoprolol tartrate 25 milliGRAM(s) Oral every 8 hours  pantoprazole    Tablet 40 milliGRAM(s) Oral before breakfast  polyethylene glycol 3350 17 Gram(s) Oral daily  senna 2 Tablet(s) Oral at bedtime  sodium chloride 0.9% lock flush 3 milliLiter(s) IV Push every 8 hours      Assessment:  37 year old man with HTN and HLD transferred for severe aortic regurgitation in the setting of bacterial endocarditis.     Plan of Care:    #Severe aortic regurgitation-  Prolapsed RCC in the setting of strep bacterial endocarditis, as per [facility].  BCx negative on this admission.  S/p mechanical AVR on 5/12/2023.   Patient well perfused and lactate WNL off of inotropic support.  ID input noted.  Warfarin dosing as per the CT surgery team.     Patient will need to establish a PMD for outpatient INR monitoring.               Over 25 minutes spent on total encounter; more than 50% of the visit was spent counseling and/or coordinating care by the attending physician.      Virgil Cutler DO [facility]  Cardiovascular Disease  [phone]

## 2023-05-16 NOTE — DIETITIAN INITIAL EVALUATION ADULT - PERSON TAUGHT/METHOD
Emphasized importance of adequate lean protein intake and optimal blood glucose levels for incision healing. Provided patient with coumadin education. Discussed examples to food containing vitamin K, amounts and to consume consistently in moderation. Provided pt with the following packet:  Vitamin K and Med-Nutrient Interactions. All questions answered and pt made aware that RD remains available./verbal instruction/written material/patient instructed

## 2023-05-16 NOTE — DIETITIAN INITIAL EVALUATION ADULT - REASON INDICATOR FOR ASSESSMENT
coumadin education. information obtained from pt, pt family at bedside, NP, electronic medical record

## 2023-05-16 NOTE — DIETITIAN NUTRITION RISK NOTIFICATION - TREATMENT: THE FOLLOWING DIET HAS BEEN RECOMMENDED
Diet, Regular:    Tube Feed Start Time: 20:00  Tube Feed Stop Time: 15:00  Supplement Feeding Modality:  Oral  Ensure Enlive Cans or Servings Per Day:  2       Frequency:  Daily (05-14-23 @ 09:12) [Active]

## 2023-05-16 NOTE — DIETITIAN INITIAL EVALUATION ADULT - PERTINENT MEDS FT
MEDICATIONS  (STANDING):  amLODIPine   Tablet 5 milliGRAM(s) Oral daily  ascorbic acid 500 milliGRAM(s) Oral two times a day  aspirin enteric coated 81 milliGRAM(s) Oral daily  bisacodyl Suppository 10 milliGRAM(s) Rectal once  chlorhexidine 2% Cloths 1 Application(s) Topical daily  gabapentin 100 milliGRAM(s) Oral every 8 hours  heparin   Injectable 5000 Unit(s) SubCutaneous every 8 hours  iron sucrose IVPB 200 milliGRAM(s) IV Intermittent every 24 hours  metoprolol tartrate 25 milliGRAM(s) Oral every 8 hours  pantoprazole    Tablet 40 milliGRAM(s) Oral before breakfast  polyethylene glycol 3350 17 Gram(s) Oral daily  senna 2 Tablet(s) Oral at bedtime  sodium chloride 0.9% lock flush 3 milliLiter(s) IV Push every 8 hours  warfarin 2.5 milliGRAM(s) Oral once    MEDICATIONS  (PRN):  acetaminophen     Tablet .. 650 milliGRAM(s) Oral every 6 hours PRN Mild Pain (1 - 3)  nalbuphine Injectable 2.5 milliGRAM(s) IV Push every 6 hours PRN Pruritus  naloxone Injectable 0.1 milliGRAM(s) IV Push every 3 minutes PRN For ANY of the following changes in patient status:  A. RR LESS THAN 10 breaths per minute, B. Oxygen saturation LESS THAN 90%, C. Sedation score of 6  ondansetron Injectable 4 milliGRAM(s) IV Push every 6 hours PRN Nausea  oxyCODONE    IR 5 milliGRAM(s) Oral every 3 hours PRN Moderate Pain (4 - 6)  oxyCODONE    IR 10 milliGRAM(s) Oral every 4 hours PRN Severe Pain (7 - 10)  simethicone 80 milliGRAM(s) Chew every 4 hours PRN Gas

## 2023-05-16 NOTE — PROGRESS NOTE ADULT - SUBJECTIVE AND OBJECTIVE BOX
infectious diseases progress note:    Patient is a 37y old  Male who presents with a chief complaint of Endocarditis eval (16 May 2023 08:08)        Atherosclerosis of native coronary artery without angina pectoris               Allergies    No Known Allergies    Intolerances        ANTIBIOTICS/RELEVANT:  antimicrobials    immunologic:    OTHER:  acetaminophen     Tablet .. 650 milliGRAM(s) Oral every 6 hours PRN  amLODIPine   Tablet 5 milliGRAM(s) Oral daily  ascorbic acid 500 milliGRAM(s) Oral two times a day  aspirin enteric coated 81 milliGRAM(s) Oral daily  bisacodyl Suppository 10 milliGRAM(s) Rectal once  chlorhexidine 2% Cloths 1 Application(s) Topical daily  gabapentin 100 milliGRAM(s) Oral every 8 hours  heparin   Injectable 5000 Unit(s) SubCutaneous every 8 hours  iron sucrose IVPB 200 milliGRAM(s) IV Intermittent every 24 hours  metoprolol tartrate 25 milliGRAM(s) Oral every 8 hours  nalbuphine Injectable 2.5 milliGRAM(s) IV Push every 6 hours PRN  naloxone Injectable 0.1 milliGRAM(s) IV Push every 3 minutes PRN  ondansetron Injectable 4 milliGRAM(s) IV Push every 6 hours PRN  oxyCODONE    IR 5 milliGRAM(s) Oral every 3 hours PRN  oxyCODONE    IR 10 milliGRAM(s) Oral every 4 hours PRN  pantoprazole    Tablet 40 milliGRAM(s) Oral before breakfast  polyethylene glycol 3350 17 Gram(s) Oral daily  senna 2 Tablet(s) Oral at bedtime  simethicone 80 milliGRAM(s) Chew every 4 hours PRN  sodium chloride 0.9% lock flush 3 milliLiter(s) IV Push every 8 hours      Objective:  Vital Signs Last 24 Hrs  T(C): 37.1 (16 May 2023 05:29), Max: 37.7 (15 May 2023 18:00)  T(F): 98.8 (16 May 2023 05:29), Max: 99.9 (15 May 2023 18:00)  HR: 75 (16 May 2023 05:29) (70 - 78)  BP: 142/89 (16 May 2023 05:29) (133/78 - 154/99)  BP(mean): 107 (16 May 2023 05:29) (107 - 107)  RR: 18 (16 May 2023 05:29) (18 - 18)  SpO2: 97% (16 May 2023 05:29) (97% - 100%)    Parameters below as of 16 May 2023 05:29  Patient On (Oxygen Delivery Method): room air           Eyes:LORNE, EOMI  Ear/Nose/Throat: no oral lesion, no sinus tenderness on percussion	  Neck:no JVD, no lymphadenopathy, supple  Respiratory: CTA kay  Cardiovascular: S1S2 RRR, no murmurs  Gastrointestinal:soft, (+) BS, no HSM  Extremities:no e/e/c        LABS:                        8.9    15.09 )-----------( 280      ( 16 May 2023 06:34 )             27.9     05-16    138  |  98  |  15  ----------------------------<  80  4.0   |  25  |  0.94    Ca    9.4      16 May 2023 06:34  Phos  2.0     05-15  Mg     2.1     05-16    TPro  6.9  /  Alb  3.8  /  TBili  0.4  /  DBili  x   /  AST  33  /  ALT  24  /  AlkPhos  45  05-15    PT/INR - ( 16 May 2023 06:34 )   PT: 23.9 sec;   INR: 2.05 ratio         PTT - ( 15 May 2023 11:25 )  PTT:31.2 sec        MICROBIOLOGY:    RECENT CULTURES:  05-12 @ 14:57 .Tissue Other       No polymorphonuclear leukocytes seen per low power field  No organisms seen per oil power field           No growth    05-10 @ 01:13 .Blood Blood                No Growth Final          RESPIRATORY CULTURES:              RADIOLOGY & ADDITIONAL STUDIES:        Pager [phone]  After 5 pm/weekends or if no response :[phone]

## 2023-05-16 NOTE — DIETITIAN INITIAL EVALUATION ADULT - PROBLEM SELECTOR PLAN 1
Admit to Dr. Chavez  Preop workup initiated - Carotids, PFTs, Labs  Full Echo  CXR  Dental consult  ID consult Dr. Curtis  Hx of Recent bacteremia/endocarditis Blood cultures sent  Complete Abx course on April 10th 2023  c/w Lasix 40 QD from [place]

## 2023-05-16 NOTE — PROGRESS NOTE ADULT - SUBJECTIVE AND OBJECTIVE BOX
VITAL SIGNS    Subjective: "I'm feeling ok" Denies CP, palpitation, SOB, BRUNSON, HA, dizziness, N/V/D, fever or chills.  No acute event noted overnight.     Telemetry:  NSR 60-80     Vital Signs Last 24 Hrs  T(C): 37.1 (23 @ 05:29), Max: 37.7 (05-15-23 @ 18:00)  T(F): 98.8 (23 @ 05:29), Max: 99.9 (05-15-23 @ 18:00)  HR: 75 (23:29) (70 - 78)  BP: 142/89 (23 @ 05:29) (133/78 - 154/99)  RR: 18 (23:29) (18 - 18)  SpO2: 97% (23 @ 05:29) (97% - 100%)           05-15 @ 07:01  -   @ 07:00  --------------------------------------------------------  IN: 970 mL / OUT: 1327 mL / NET: -357 mL     @ 07:01  -   @ 09:50  --------------------------------------------------------  IN: 120 mL / OUT: 300 mL / NET: -180 mL    LABS      138  |  98  |  15  ----------------------------<  80  4.0   |  25  |  0.94    Ca    9.4      16 May 2023 06:34  Phos  2.0     05-15  Mg     2.1         TPro  6.9  /  Alb  3.8  /  TBili  0.4  /  DBili  x   /  AST  33  /  ALT  24  /  AlkPhos  45  05-15                                 8.9    15.09 )-----------( 280      ( 16 May 2023 06:34 )             27.9          PT/INR - ( 16 May 2023 06:34 )   PT: 23.9 sec;   INR: 2.05 ratio         PTT - ( 15 May 2023 11:25 )  PTT:31.2 sec        Daily     Daily Weight in k.4 (16 May 2023 08:13)      CAPILLARY BLOOD GLUCOSE      POCT Blood Glucose.: 103 mg/dL (15 May 2023 11:25)          PHYSICAL EXAM    Neurology: alert and oriented x 3, nonfocal, no gross deficits    CV: (+) S1 and S2, (+) click    Sternal Wound: MSI -->JOEY --> CDI, sternum stable; PW x 2 --> EPM VVI 50 /10; MS Mckeon CATE --> SS; total 27 cc / 24 hours     Lungs: CTA B/L; Diminished at base     Abdomen: soft, nontender, nondistended, positive bowel sounds, (+) Flatus; (+) BM     :  Voiding               Extremities:  B/L LE (-) edema; negative calf tenderness; (+) 2 DP palpable          acetaminophen  Tablet .. 650 milliGRAM(s) Oral every 6 hours PRN  amLODIPine Tablet 5 milliGRAM(s) Oral daily  ascorbic acid 500 milliGRAM(s) Oral two times a day  aspirin enteric coated 81 milliGRAM(s) Oral daily  bisacodyl Suppository 10 milliGRAM(s) Rectal once  chlorhexidine 2% Cloths 1 Application(s) Topical daily  gabapentin 100 milliGRAM(s) Oral every 8 hours  heparin Injectable 5000 Unit(s) SubCutaneous every 8 hours  iron sucrose IVPB 200 milliGRAM(s) IV Intermittent every 24 hours  metoprolol tartrate 25 milliGRAM(s) Oral every 8 hours  nalbuphine Injectable 2.5 milliGRAM(s) IV Push every 6 hours PRN  naloxone Injectable 0.1 milliGRAM(s) IV Push every 3 minutes PRN  ondansetron Injectable 4 milliGRAM(s) IV Push every 6 hours PRN  oxyCODONE IR 10 milliGRAM(s) Oral every 4 hours PRN  oxyCODONE IR 5 milliGRAM(s) Oral every 3 hours PRN  pantoprazole Tablet 40 milliGRAM(s) Oral before breakfast  polyethylene glycol 3350 17 Gram(s) Oral daily  senna 2 Tablet(s) Oral at bedtime  simethicone 80 milliGRAM(s) Chew every 4 hours PRN  sodium chloride 0.9% lock flush 3 milliLiter(s) IV Push every 8 hours      Physical Therapy Rec:   Home  [ X]   Home w/ PT  [  ]  Rehab  [  ]    Discussed with Cardiothoracic Team at AM rounds.

## 2023-05-16 NOTE — DIETITIAN INITIAL EVALUATION ADULT - ORAL INTAKE PTA/DIET HISTORY
visited pt at bedside this morning. reports to not be following a specific diet PTA. No DM noted, current Hgb A1c 5.3% WNL. reports lactose intolerance.

## 2023-05-16 NOTE — DIETITIAN INITIAL EVALUATION ADULT - NS FNS DIET ORDER
Diet, Regular:      Supplement Feeding Modality:  Oral  Ensure Enlive Cans or Servings Per Day:  2       Frequency:  Daily (05-14-23 @ 09:12) [Active]

## 2023-05-16 NOTE — DIETITIAN INITIAL EVALUATION ADULT - ADD RECOMMEND
1) Will continue to monitor PO intake, weight, labs, skin, GI status and diet 2) Keep diet liberalized to optimize calories provided. Pt does not like Ensure oral supplement, can consider d/c and RD to add double protein at breakfast and dinner to aid in meeting nutrient needs. 3) continue vitamin C and iron per team. Vitamin C will aid in incision healing 4) nutrition risk placed in chart

## 2023-05-16 NOTE — DIETITIAN INITIAL EVALUATION ADULT - PHYSCIAL ASSESSMENT
reports UBW ~190 pounds PTA.   Weight loss noted since admission.   Current standing weight 177.2 pounds. This indicates a 12.8 pound / 6.7% loss x ~8 days.   RD will continue to monitor trends.

## 2023-05-16 NOTE — DIETITIAN INITIAL EVALUATION ADULT - PERTINENT LABORATORY DATA
05-16    138  |  98  |  15  ----------------------------<  80  4.0   |  25  |  0.94    Ca    9.4      16 May 2023 06:34  Phos  2.0     05-15  Mg     2.1     05-16    TPro  6.9  /  Alb  3.8  /  TBili  0.4  /  DBili  x   /  AST  33  /  ALT  24  /  AlkPhos  45  05-15  POCT Blood Glucose.: 103 mg/dL (05-15-23 @ 11:25)  A1C with Estimated Average Glucose Result: 5.3 % (05-10-23 @ 01:13)

## 2023-05-17 LAB
ANION GAP SERPL CALC-SCNC: 14 MMOL/L — SIGNIFICANT CHANGE UP (ref 5–17)
BUN SERPL-MCNC: 13 MG/DL — SIGNIFICANT CHANGE UP (ref 7–23)
CALCIUM SERPL-MCNC: 9.1 MG/DL — SIGNIFICANT CHANGE UP (ref 8.4–10.5)
CHLORIDE SERPL-SCNC: 97 MMOL/L — SIGNIFICANT CHANGE UP (ref 96–108)
CO2 SERPL-SCNC: 26 MMOL/L — SIGNIFICANT CHANGE UP (ref 22–31)
CREAT SERPL-MCNC: 0.84 MG/DL — SIGNIFICANT CHANGE UP (ref 0.5–1.3)
CULTURE RESULTS: SIGNIFICANT CHANGE UP
CULTURE RESULTS: SIGNIFICANT CHANGE UP
EGFR: 114 ML/MIN/1.73M2 — SIGNIFICANT CHANGE UP
GLUCOSE SERPL-MCNC: 95 MG/DL — SIGNIFICANT CHANGE UP (ref 70–99)
HCT VFR BLD CALC: 26.4 % — LOW (ref 39–50)
HGB BLD-MCNC: 8.5 G/DL — LOW (ref 13–17)
INR BLD: 1.95 RATIO — HIGH (ref 0.88–1.16)
MAGNESIUM SERPL-MCNC: 1.8 MG/DL — SIGNIFICANT CHANGE UP (ref 1.6–2.6)
MCHC RBC-ENTMCNC: 27.6 PG — SIGNIFICANT CHANGE UP (ref 27–34)
MCHC RBC-ENTMCNC: 32.2 GM/DL — SIGNIFICANT CHANGE UP (ref 32–36)
MCV RBC AUTO: 85.7 FL — SIGNIFICANT CHANGE UP (ref 80–100)
NRBC # BLD: 1 /100 WBCS — HIGH (ref 0–0)
PHOSPHATE SERPL-MCNC: 3.4 MG/DL — SIGNIFICANT CHANGE UP (ref 2.5–4.5)
PLATELET # BLD AUTO: 349 K/UL — SIGNIFICANT CHANGE UP (ref 150–400)
POTASSIUM SERPL-MCNC: 3.6 MMOL/L — SIGNIFICANT CHANGE UP (ref 3.5–5.3)
POTASSIUM SERPL-SCNC: 3.6 MMOL/L — SIGNIFICANT CHANGE UP (ref 3.5–5.3)
PROTHROM AB SERPL-ACNC: 22.6 SEC — HIGH (ref 10.5–13.4)
RBC # BLD: 3.08 M/UL — LOW (ref 4.2–5.8)
RBC # FLD: 14.3 % — SIGNIFICANT CHANGE UP (ref 10.3–14.5)
SODIUM SERPL-SCNC: 137 MMOL/L — SIGNIFICANT CHANGE UP (ref 135–145)
SPECIMEN SOURCE: SIGNIFICANT CHANGE UP
SPECIMEN SOURCE: SIGNIFICANT CHANGE UP
WBC # BLD: 12.18 K/UL — HIGH (ref 3.8–10.5)
WBC # FLD AUTO: 12.18 K/UL — HIGH (ref 3.8–10.5)

## 2023-05-17 PROCEDURE — 93308 TTE F-UP OR LMTD: CPT | Mod: 26

## 2023-05-17 PROCEDURE — 93321 DOPPLER ECHO F-UP/LMTD STD: CPT | Mod: 26

## 2023-05-17 PROCEDURE — 31575 DIAGNOSTIC LARYNGOSCOPY: CPT

## 2023-05-17 PROCEDURE — 99232 SBSQ HOSP IP/OBS MODERATE 35: CPT

## 2023-05-17 PROCEDURE — 99024 POSTOP FOLLOW-UP VISIT: CPT

## 2023-05-17 RX ORDER — METOPROLOL TARTRATE 50 MG
50 TABLET ORAL DAILY
Refills: 0 | Status: DISCONTINUED | OUTPATIENT
Start: 2023-05-17 | End: 2023-05-18

## 2023-05-17 RX ORDER — POTASSIUM BICARBONATE 978 MG/1
25 TABLET, EFFERVESCENT ORAL
Refills: 0 | Status: COMPLETED | OUTPATIENT
Start: 2023-05-17 | End: 2023-05-17

## 2023-05-17 RX ORDER — WARFARIN SODIUM 2.5 MG/1
5 TABLET ORAL ONCE
Refills: 0 | Status: COMPLETED | OUTPATIENT
Start: 2023-05-17 | End: 2023-05-17

## 2023-05-17 RX ADMIN — SODIUM CHLORIDE 3 MILLILITER(S): 9 INJECTION INTRAMUSCULAR; INTRAVENOUS; SUBCUTANEOUS at 05:14

## 2023-05-17 RX ADMIN — POTASSIUM BICARBONATE 25 MILLIEQUIVALENT(S): 978 TABLET, EFFERVESCENT ORAL at 14:23

## 2023-05-17 RX ADMIN — OXYCODONE HYDROCHLORIDE 10 MILLIGRAM(S): 5 TABLET ORAL at 05:08

## 2023-05-17 RX ADMIN — OXYCODONE HYDROCHLORIDE 10 MILLIGRAM(S): 5 TABLET ORAL at 09:42

## 2023-05-17 RX ADMIN — GABAPENTIN 100 MILLIGRAM(S): 400 CAPSULE ORAL at 05:08

## 2023-05-17 RX ADMIN — SENNA PLUS 2 TABLET(S): 8.6 TABLET ORAL at 21:06

## 2023-05-17 RX ADMIN — Medication 50 MILLIGRAM(S): at 18:07

## 2023-05-17 RX ADMIN — Medication 81 MILLIGRAM(S): at 13:09

## 2023-05-17 RX ADMIN — Medication 500 MILLIGRAM(S): at 05:08

## 2023-05-17 RX ADMIN — OXYCODONE HYDROCHLORIDE 10 MILLIGRAM(S): 5 TABLET ORAL at 13:39

## 2023-05-17 RX ADMIN — IRON SUCROSE 110 MILLIGRAM(S): 20 INJECTION, SOLUTION INTRAVENOUS at 13:45

## 2023-05-17 RX ADMIN — OXYCODONE HYDROCHLORIDE 10 MILLIGRAM(S): 5 TABLET ORAL at 00:55

## 2023-05-17 RX ADMIN — HEPARIN SODIUM 5000 UNIT(S): 5000 INJECTION INTRAVENOUS; SUBCUTANEOUS at 05:08

## 2023-05-17 RX ADMIN — AMLODIPINE BESYLATE 5 MILLIGRAM(S): 2.5 TABLET ORAL at 05:09

## 2023-05-17 RX ADMIN — OXYCODONE HYDROCHLORIDE 10 MILLIGRAM(S): 5 TABLET ORAL at 18:07

## 2023-05-17 RX ADMIN — PANTOPRAZOLE SODIUM 40 MILLIGRAM(S): 20 TABLET, DELAYED RELEASE ORAL at 05:08

## 2023-05-17 RX ADMIN — OXYCODONE HYDROCHLORIDE 10 MILLIGRAM(S): 5 TABLET ORAL at 18:37

## 2023-05-17 RX ADMIN — Medication 25 MILLIGRAM(S): at 13:10

## 2023-05-17 RX ADMIN — OXYCODONE HYDROCHLORIDE 10 MILLIGRAM(S): 5 TABLET ORAL at 06:08

## 2023-05-17 RX ADMIN — Medication 25 MILLIGRAM(S): at 05:09

## 2023-05-17 RX ADMIN — OXYCODONE HYDROCHLORIDE 10 MILLIGRAM(S): 5 TABLET ORAL at 13:09

## 2023-05-17 RX ADMIN — OXYCODONE HYDROCHLORIDE 10 MILLIGRAM(S): 5 TABLET ORAL at 23:56

## 2023-05-17 RX ADMIN — SODIUM CHLORIDE 3 MILLILITER(S): 9 INJECTION INTRAMUSCULAR; INTRAVENOUS; SUBCUTANEOUS at 14:10

## 2023-05-17 RX ADMIN — POTASSIUM BICARBONATE 25 MILLIEQUIVALENT(S): 978 TABLET, EFFERVESCENT ORAL at 12:30

## 2023-05-17 RX ADMIN — WARFARIN SODIUM 5 MILLIGRAM(S): 2.5 TABLET ORAL at 21:06

## 2023-05-17 RX ADMIN — OXYCODONE HYDROCHLORIDE 10 MILLIGRAM(S): 5 TABLET ORAL at 01:57

## 2023-05-17 RX ADMIN — POLYETHYLENE GLYCOL 3350 17 GRAM(S): 17 POWDER, FOR SOLUTION ORAL at 13:10

## 2023-05-17 RX ADMIN — SODIUM CHLORIDE 3 MILLILITER(S): 9 INJECTION INTRAMUSCULAR; INTRAVENOUS; SUBCUTANEOUS at 20:14

## 2023-05-17 RX ADMIN — OXYCODONE HYDROCHLORIDE 10 MILLIGRAM(S): 5 TABLET ORAL at 09:12

## 2023-05-17 NOTE — PROGRESS NOTE ADULT - SUBJECTIVE AND OBJECTIVE BOX
Subjective ' hello I am ok "    VITAL SIGNS    Telemetry: NSR 79- 90      Vital Signs Last 24 Hrs  T(C): 36.7 (23 @ 04:20), Max: 36.8 (23 @ 19:58)  T(F): 98.1 (23 @ 04:20), Max: 98.2 (23 @ 19:58)  HR: 83 (23 @ 04:20) (78 - 85)  BP: 114/80 (23 @ 04:20) (114/76 - 146/87)  RR: 18 (23 @ 04:20) (18 - 18)  SpO2: 96% (23 @ 04:20) (95% - 97%)              @ 07:01  -   @ 07:00  --------------------------------------------------------  IN: 660 mL / OUT: 1610 mL / NET: -950 mL     @ 07:01  -   @ 09:08  --------------------------------------------------------  IN: 120 mL / OUT: 0 mL / NET: 120 mL    Daily Weight in k.2 (17 May 2023 07:27)                          8.5    12.18 )-----------( 349      ( 17 May 2023 05:50 )             26.4           137  |  97  |  13  ----------------------------<  95  3.6   |  26  |  0.84    Ca    9.1      17 May 2023 05:49  Phos  3.4       Mg     1.8         TPro  6.9  /  Alb  3.8  /  TBili  0.4  /  DBili  x   /  AST  33  /  ALT  24  /  AlkPhos  45  05-15        CAPILLARY BLOOD GLUCOSE              Drains:     MS         [  ] Drainage:                 L Pleural  [  ]  Drainage:                R Pleural  [  ]  Drainage:    Pacing Wires        [  ]   Settings:                                  Isolated  [  ]    Coumadin    [ ] YES          [  ]      NO         Reason:                               PHYSICAL EXAM        Neurology: alert and oriented x 3, nonfocal, no gross deficits    CV :    Sternal Wound :  CDI , Stable    Lungs:    Abdomen: soft, nontender, nondistended, positive bowel sounds, last bowel movement     :               Extremities:                                           Physical Therapy Rec:   Home  [  ]   Home w/ PT  [  ]  Rehab  [  ]    Discussed with Cardiothoracic Team at AM rounds.

## 2023-05-17 NOTE — PROGRESS NOTE ADULT - PROBLEM SELECTOR PLAN 1
s/p AVR Mechanical on 5/12 /w Dr. Chavez  Continue AC on Coumadin, check daily PT/INR  Coumadin 5 mg Po tonight  INR 1.95   Continue ASA 81mg QD and Lopressor 25mg Q8H  Cough and deep breathe, Incentive Spirometry Q1h, Chest PT.  Ambulate 4x daily as tolerated and with PT.   C/W GI prophylaxis on Protonix and DVT prophylaxis on SQ Heparin.   5/17 ENT consulted- no acute findings- no lesion seen  ECHO today   Disposition: Home in am

## 2023-05-17 NOTE — CONSULT NOTE ADULT - PROBLEM SELECTOR RECOMMENDATION 9
- no findings on indirect laryngoscopy  - no ENT intervention required  - call with questions or concerns x [phone]

## 2023-05-17 NOTE — PROGRESS NOTE ADULT - SUBJECTIVE AND OBJECTIVE BOX
Cardiovascular Disease Progress Note  Date of service: 23 @ 07:54    Overnight events: No acute events overnight.  Pt is in no distress.   Otherwise review of systems negative    Objective Findings:  T(C): 36.7 (23 @ 04:20), Max: 36.8 (23 @ 19:58)  HR: 83 (23 @ 04:20) (78 - 85)  BP: 114/80 (23 @ 04:20) (114/76 - 146/87)  RR: 18 (23 @ 04:20) (18 - 18)  SpO2: 96% (23 @ 04:20) (95% - 97%)  Wt(kg): --  Daily     Daily Weight in k.2 (17 May 2023 07:27)      Physical Exam:  Gen: NAD; Patient resting comfortably  HEENT: EOMI, Normocephalic/ atraumatic  CV: RRR, normal S1 + S2, no m/r/g  Lungs:  Normal respiratory effort; clear to auscultation bilaterally  Abd: soft, non-tender; bowel sounds present  Ext: No edema; warm and well perfused    Telemetry: Sinus     Laboratory Data:                        8.5    12.18 )-----------( 349      ( 17 May 2023 05:50 )             26.4         137  |  97  |  13  ----------------------------<  95  3.6   |  26  |  0.84    Ca    9.1      17 May 2023 05:49  Phos  3.4     -  Mg     1.8         TPro  6.9  /  Alb  3.8  /  TBili  0.4  /  DBili  x   /  AST  33  /  ALT  24  /  AlkPhos  45  05-15    PT/INR - ( 17 May 2023 05:50 )   PT: 22.6 sec;   INR: 1.95 ratio         PTT - ( 15 May 2023 11:25 )  PTT:31.2 sec          Inpatient Medications:  MEDICATIONS  (STANDING):  amLODIPine   Tablet 5 milliGRAM(s) Oral daily  aspirin enteric coated 81 milliGRAM(s) Oral daily  bisacodyl Suppository 10 milliGRAM(s) Rectal once  chlorhexidine 2% Cloths 1 Application(s) Topical daily  heparin   Injectable 5000 Unit(s) SubCutaneous every 8 hours  iron sucrose IVPB 200 milliGRAM(s) IV Intermittent every 24 hours  metoprolol tartrate 25 milliGRAM(s) Oral every 8 hours  pantoprazole    Tablet 40 milliGRAM(s) Oral before breakfast  polyethylene glycol 3350 17 Gram(s) Oral daily  senna 2 Tablet(s) Oral at bedtime  sodium chloride 0.9% lock flush 3 milliLiter(s) IV Push every 8 hours      Assessment:  37 year old man with HTN and HLD transferred for severe aortic regurgitation in the setting of bacterial endocarditis.     Plan of Care:    #Severe aortic regurgitation-  Prolapsed RCC in the setting of strep bacterial endocarditis, as per [facility].  BCx negative on this admission.  S/p mechanical AVR on 2023.   Patient well perfused and lactate WNL off of inotropic support.  ID input noted.  Warfarin dosing as per the CT surgery team.       #HTN  - Continue current regimen      Patient will need to establish a PMD for outpatient INR monitoring.           Over 25 minutes spent on total encounter; more than 50% of the visit was spent counseling and/or coordinating care by the attending physician.      Virgil Cutler,  [facility]  Cardiovascular Disease  [phone]

## 2023-05-17 NOTE — PROGRESS NOTE ADULT - NUTRITIONAL ASSESSMENT
This patient has been assessed with a concern for Malnutrition and has been determined to have a diagnosis/diagnoses of Severe protein-calorie malnutrition.    This patient is being managed with:   Diet Regular-   Tube Feed Start Time: 20:00  Tube Feed Stop Time: 15:00  Supplement Feeding Modality:  Oral  Ensure Enlive Cans or Servings Per Day:  2       Frequency:  Daily  Entered: May 14 2023  9:11AM  
This patient has been assessed with a concern for Malnutrition and has been determined to have a diagnosis/diagnoses of Severe protein-calorie malnutrition.    This patient is being managed with:   Diet Regular-   Tube Feed Start Time: 20:00  Tube Feed Stop Time: 15:00  Supplement Feeding Modality:  Oral  Ensure Enlive Cans or Servings Per Day:  2       Frequency:  Daily  Entered: May 14 2023  9:11AM

## 2023-05-17 NOTE — PROGRESS NOTE ADULT - SUBJECTIVE AND OBJECTIVE BOX
infectious diseases progress note:    Patient is a 37y old  Male who presents with a chief complaint of Endocarditis  s/p AVR (17 May 2023 09:08)        Atherosclerosis of native coronary artery without angina pectoris             Allergies    No Known Allergies    Intolerances        ANTIBIOTICS/RELEVANT:  antimicrobials    immunologic:    OTHER:  acetaminophen     Tablet .. 650 milliGRAM(s) Oral every 6 hours PRN  amLODIPine   Tablet 5 milliGRAM(s) Oral daily  aspirin enteric coated 81 milliGRAM(s) Oral daily  bisacodyl Suppository 10 milliGRAM(s) Rectal once  chlorhexidine 2% Cloths 1 Application(s) Topical daily  heparin   Injectable 5000 Unit(s) SubCutaneous every 8 hours  iron sucrose IVPB 200 milliGRAM(s) IV Intermittent every 24 hours  metoprolol tartrate 25 milliGRAM(s) Oral every 8 hours  naloxone Injectable 0.1 milliGRAM(s) IV Push every 3 minutes PRN  ondansetron Injectable 4 milliGRAM(s) IV Push every 6 hours PRN  oxyCODONE    IR 10 milliGRAM(s) Oral every 4 hours PRN  oxyCODONE    IR 5 milliGRAM(s) Oral every 3 hours PRN  pantoprazole    Tablet 40 milliGRAM(s) Oral before breakfast  polyethylene glycol 3350 17 Gram(s) Oral daily  senna 2 Tablet(s) Oral at bedtime  simethicone 80 milliGRAM(s) Chew every 4 hours PRN  sodium chloride 0.9% lock flush 3 milliLiter(s) IV Push every 8 hours  warfarin 5 milliGRAM(s) Oral once      Objective:  Vital Signs Last 24 Hrs  T(C): 36.7 (17 May 2023 04:20), Max: 36.8 (16 May 2023 19:58)  T(F): 98.1 (17 May 2023 04:20), Max: 98.2 (16 May 2023 19:58)  HR: 83 (17 May 2023 04:20) (78 - 85)  BP: 114/80 (17 May 2023 04:20) (114/76 - 146/87)  BP(mean): 913 (17 May 2023 04:20) (913 - 913)  RR: 18 (17 May 2023 04:20) (18 - 18)  SpO2: 96% (17 May 2023 04:20) (95% - 97%)    Parameters below as of 17 May 2023 04:20  Patient On (Oxygen Delivery Method): room air           Eyes:LORNE, EOMI  Ear/Nose/Throat: no oral lesion, no sinus tenderness on percussion	  Neck:no JVD, no lymphadenopathy, supple  Respiratory: CTA kay  Cardiovascular: S1S2 RRR, no murmurs  Gastrointestinal:soft, (+) BS, no HSM  Extremities:no e/e/c        LABS:                        8.5    12.18 )-----------( 349      ( 17 May 2023 05:50 )             26.4     05-17    137  |  97  |  13  ----------------------------<  95  3.6   |  26  |  0.84    Ca    9.1      17 May 2023 05:49  Phos  3.4     05-17  Mg     1.8     05-17    TPro  6.9  /  Alb  3.8  /  TBili  0.4  /  DBili  x   /  AST  33  /  ALT  24  /  AlkPhos  45  05-15    PT/INR - ( 17 May 2023 05:50 )   PT: 22.6 sec;   INR: 1.95 ratio         PTT - ( 15 May 2023 11:25 )  PTT:31.2 sec        MICROBIOLOGY:    RECENT CULTURES:  05-12 @ 14:57 .Tissue Other       No polymorphonuclear leukocytes seen per low power field  No organisms seen per oil power field           No growth          RESPIRATORY CULTURES:              RADIOLOGY & ADDITIONAL STUDIES:        Pager [phone]  After 5 pm/weekends or if no response :[phone]

## 2023-05-17 NOTE — CONSULT NOTE ADULT - SUBJECTIVE AND OBJECTIVE BOX
Cardiovascular Disease Initial Evaluation  Date of Service: 05-12-23 @ 06:54    CHIEF COMPLAINT: Transfer for surgery    HISTORY OF PRESENT ILLNESS:    This is a 37 year old man with HTN, HLD, "heart murmur secondary to an infection in my blood (3/2023),"  who was transferred from [facility] to [facility] on 5/10/2023 for Severe AI/endocarditis eval. Patient initially presented to [place]'s ER for evaluation of cough and shortness of breath x 4 days. Patient states that over the course the last 4 days he has been experiencing shortness of breath as well as cough productive of white to yellow sputum, and states that there was a small blood streak in the sputum today. Patient states that he has been experiencing chest discomfort for the last 2 days as well, and points to the epigastric and left side of the chest when asked where the pain is localized. Patient states that the shortness of breath is worse when laying down, and states he has never had this before. PE ruled out at [facility] and Negative troponins. Of note Patient states he has step bacteremia in [facility] in March 2023 and was send for on antibiotics which he completed in April 2023. Denies any fever, chills, palpitations, headache, dizziness, abdominal pain, nausea, vomiting, diarrhea, constipation, back pain, tearing sensation, pedal edema, calf discomfort.     Allergies  No Known Allergies        MEDICATIONS:    cefuroxime  IVPB 1500 milliGRAM(s) IV Intermittent once      acetaminophen     Tablet .. 1000 milliGRAM(s) Oral once  gabapentin 300 milliGRAM(s) Oral once      insulin lispro (ADMELOG) corrective regimen sliding scale   SubCutaneous at bedtime  insulin lispro (ADMELOG) corrective regimen sliding scale   SubCutaneous three times a day before meals    ascorbic acid 2000 milliGRAM(s) Oral once  chlorhexidine 0.12% Liquid 30 milliLiter(s) Swish and Spit once  potassium bicarbonate/potassium citrate 25 milliEquivalent(s) Oral every 1 hour  potassium chloride    Tablet ER 20 milliEquivalent(s) Oral daily  sodium chloride 0.9% lock flush 3 milliLiter(s) IV Push every 8 hours      PAST MEDICAL & SURGICAL HISTORY:  HTN (hypertension)      HLD (hyperlipidemia)      DM (diabetes mellitus)          FAMILY HISTORY:  HTN    SOCIAL HISTORY:    The patient is a nonsmoker       REVIEW OF SYSTEMS:  See HPI, otherwise complete 14 point review of systems negative        PHYSICAL EXAM:  T(C): 36.7 (05-12-23 @ 05:11), Max: 36.9 (05-11-23 @ 12:36)  HR: 75 (05-12-23 @ 05:11) (60 - 75)  BP: 120/59 (05-12-23 @ 05:11) (93/54 - 143/69)  RR: 18 (05-12-23 @ 05:11) (16 - 18)  SpO2: 95% (05-12-23 @ 05:11) (95% - 99%)  Wt(kg): --  I&O's Summary    10 May 2023 07:01  -  11 May 2023 07:00  --------------------------------------------------------  IN: 920 mL / OUT: 0 mL / NET: 920 mL    11 May 2023 07:01  -  12 May 2023 06:54  --------------------------------------------------------  IN: 960 mL / OUT: 250 mL / NET: 710 mL        Appearance: No Acute Distress; resting comfortably  HEENT:  Normal oral mucosa, PERRL, EOMI	  Cardiovascular: Normal S1 S2, No JVD, Diastolic murmur  Respiratory: Normal respiratory effort; Lungs clear to auscultation bilaterally  Gastrointestinal:  Soft, Non-tender, + BS	  Skin: No rashes, No ecchymoses, No cyanosis	  Neurologic: Non-focal; no weakness  Extremities: No clubbing, cyanosis or edema  Vascular: Peripheral pulses palpable 2+ bilaterally  Psychiatry: A & O x 3, Mood & affect appropriate    Laboratory Data:	 	    CBC Full  -  ( 11 May 2023 06:24 )  WBC Count : 6.41 K/uL  Hemoglobin : 12.2 g/dL  Hematocrit : 37.7 %  Platelet Count - Automated : 253 K/uL  Mean Cell Volume : 85.5 fl  Mean Cell Hemoglobin : 27.7 pg  Mean Cell Hemoglobin Concentration : 32.4 gm/dL  Auto Neutrophil # : x  Auto Lymphocyte # : x  Auto Monocyte # : x  Auto Eosinophil # : x  Auto Basophil # : x  Auto Neutrophil % : x  Auto Lymphocyte % : x  Auto Monocyte % : x  Auto Eosinophil % : x  Auto Basophil % : x    05-12    141  |  105  |  22  ----------------------------<  93  3.7   |  23  |  1.16  05-11    138  |  100  |  18  ----------------------------<  108<H>  3.5   |  24  |  1.07    Ca    9.4      12 May 2023 05:02  Ca    9.4      11 May 2023 06:24  Phos  4.7     05-12  Mg     2.1     05-12    TPro  7.7  /  Alb  4.3  /  TBili  0.3  /  DBili  x   /  AST  19  /  ALT  21  /  AlkPhos  58  05-12      Interpretation of Telemetry: Sinus	    ECG:  	Sinus      Assessment:  37 year old man with HTN and HLD transferred for severe aortic regurgitation in the setting of bacterial endocarditis.     Plan of Care:    #Severe aortic regurgitation-  Prolapsed RCC in the setting of bacterial endocarditis as per [facility].  BCx negative here.  Plan for AVR today.  ID input noted.    #HTN-  BP controlled.     Patient will need to establish a PMD for outpatient INR monitoring.     Critically ill patient with plan for CTICU care post AVR.       72 minutes spent on total encounter; more than 50% of the visit was spent counseling and/or coordinating care by the attending physician.   	  Asad Cutler MD [facility]  Cardiovascular Diseases  [phone]    
Patient is a 37y old  Male who presents with a chief complaint of Endocarditis eval (10 May 2023 12:51)    Dental consulted to r/o odontogenic infection as source of endocarditis. Patient denies recent dental work or dental discomfort.    HPI:  37M, PmHx DM, HTN, HLD, "heart murmur secondary to an infection in my blood (3/2023)," Now presents to [facility] from [facility], for Severe AI/endocarditis eval. Patient initially presented to [place] ER for evaluation of cough and shortness of breath x 4 days. Patient states that over the course the last 4 days he has been experiencing shortness of breath as well as cough productive of white to yellow sputum, and states that there was a small blood streak in the sputum today. Patient states that he has been experiencing chest discomfort for the last 2 days as well, and points to the epigastric and left side of the chest when asked where the pain is localized. Patient states that the shortness of breath is worse when laying down, and states he has never had this before. PE ruled out at [facility] and Negative troponins. Of note Patient states he has step bacteremia in [facility] in March 2023 and was send for on antibiotics which he completed in April 2023. Denies any fever, chills, palpitations, headache, dizziness, abdominal pain, nausea, vomiting, diarrhea, constipation, back pain, tearing sensation, pedal edema, calf discomfort.  (09 May 2023 22:47)      PAST MEDICAL & SURGICAL HISTORY:  HTN (hypertension)      HLD (hyperlipidemia)      DM (diabetes mellitus)    MEDICATIONS  (STANDING):  heparin   Injectable 5000 Unit(s) SubCutaneous every 12 hours  insulin lispro (ADMELOG) corrective regimen sliding scale   SubCutaneous at bedtime  insulin lispro (ADMELOG) corrective regimen sliding scale   SubCutaneous three times a day before meals  potassium bicarbonate/potassium citrate 25 milliEquivalent(s) Oral every 1 hour  sodium chloride 0.9% lock flush 3 milliLiter(s) IV Push every 8 hours    Allergies    No Known Allergies    EOE: (-) trismus, (-) asymmetry, (-) LAD, (-) palpation, (-) dysphagia, (-) swelling  IOE: (-) swelling, (-) percussion  (+) palpation: #17, partial soft-tissue impaction     Rads: Pan taken and interpreted  Permanent dentition grossly intact.   #16 bony impaction  #17 partial soft-tissue impaction approximating JAREN    Assessment: No evidence of acute odontogenic infection. Dental source unlikely cause of patient’s endocarditis.    Procedure: Limited clinical and radiographic exam completed w/ patient’s verbal consent. Discussed findings w/ patient, all questions answered. Demonstrated w/ patient how to clean #17 area.     Recommendations:  1. F/u w/ outpatient dentist for comprehensive dental care    Cristina Zambrano DDS #[id number]
Patient is a 37y old  Male who presents with a chief complaint of Endocarditis eval (09 May 2023 22:47)    HPI:  37M, PmHx DM, HTN, HLD, "heart murmur secondary to an infection in my blood (3/2023)," Now presents to [facility] from [facility], for Severe AI/endocarditis eval. Patient initially presented to [place]'s ER for evaluation of cough and shortness of breath x 4 days. Patient states that over the course the last 4 days he has been experiencing shortness of breath as well as cough productive of white to yellow sputum, and states that there was a small blood streak in the sputum today. Patient states that he has been experiencing chest discomfort for the last 2 days as well, and points to the epigastric and left side of the chest when asked where the pain is localized. Patient states that the shortness of breath is worse when laying down, and states he has never had this before. PE ruled out at [facility] and Negative troponins. Of note Patient states he has step bacteremia in [facility] in March 2023 and was send for on antibiotics which he completed in April 2023. Denies any fever, chills, palpitations, headache, dizziness, abdominal pain, nausea, vomiting, diarrhea, constipation, back pain, tearing sensation, pedal edema, calf discomfort.  (09 May 2023 22:47)      PAST MEDICAL & SURGICAL HISTORY:  HTN (hypertension)      HLD (hyperlipidemia)      DM (diabetes mellitus)          Social history:    FAMILY HISTORY:    REVIEW OF SYSTEMS  General:	Denies any malaise fatigue or chills. Fevers absent    Skin:No rash  	  Ophthalmologic:Denies any visual complaints,discharge redness or photophobia  	  ENMT:No nasal discharge,headache,sinus congestion or throat pain.No dental complaints    Respiratory and Thorax:No cough,sputum or chest pain.Denies shortness of breath  	  Cardiovascular:	No chest pain,palpitaions or dizziness    Gastrointestinal:	NO nausea,abdominal pain or diarrhea.    Genitourinary:	No dysuria,frequency. No flank pain    Musculoskeletal:	No joint swelling or pain.No weakness    Neurological:No confusion,diziness.No extremity weakness.No bladder or bowel incontinence	    Psychiatric:No delusions or hallucinations	    Hematology/Lymphatics:	No LN swelling.No gum bleeding     Endocrine:	No recent weight gain or loss.No abnormal heat/cold intolerance    Allergic/Immunologic:	No hives or rash   Allergies    No Known Allergies    Intolerances        Antimicrobials:          Vital Signs Last 24 Hrs  T(C): 36.7 (10 May 2023 05:35), Max: 37 (09 May 2023 21:20)  T(F): 98 (10 May 2023 05:35), Max: 98.6 (09 May 2023 21:20)  HR: 61 (10 May 2023 05:35) (61 - 66)  BP: 112/66 (10 May 2023 05:35) (112/66 - 130/62)  BP(mean): --  RR: 18 (10 May 2023 05:35) (18 - 18)  SpO2: 98% (10 May 2023 05:35) (98% - 98%)    Parameters below as of 10 May 2023 05:35  Patient On (Oxygen Delivery Method): room air        PHYSICAL EXAM:Pleasant patient in no acute distress.      Constitutional:Comfortable.Awake and alert  No cachexia     Eyes:PERRL EOMI.NO discharge or conjunctival injection    ENMT:No sinus tenderness.No thrush.No pharyngeal exudate or erythema.Fair dental hygiene    Neck:Supple,No LN,no JVD      Respiratory:Good air entry bilaterally,CTA    Cardiovascular:S1 S2 wnl, No murmurs,rub or gallops    Gastrointestinal:Soft BS(+) no tenderness no masses ,No rebound or guarding    Genitourinary:No CVA tendereness     Rectal:    Extremities:No cyanosis,clubbing or edema.    Vascular:peripheral pulses felt    Neurological:AAO X 3,No grossly focal deficits    Skin:No rash                                      11.9   6.35  )-----------( 263      ( 10 May 2023 05:41 )             37.0         05-10    139  |  100  |  23  ----------------------------<  93  3.3<L>   |  23  |  1.25    Ca    9.2      10 May 2023 05:39  Phos  4.3     05-10  Mg     2.1     05-10    TPro  7.6  /  Alb  4.4  /  TBili  0.6  /  DBili  x   /  AST  18  /  ALT  23  /  AlkPhos  49  05-10      RECENT CULTURES:      MICROBIOLOGY:          Radiology:      Assessment:        Recommendations and Plan:    Pager [phone]  After 5 pm/weekends or if no response :[phone]      
CC: ?lesion in throat    HPI: 36yo male with PMHx DM, HTN, HLD, admitted for endocarditis, s/p mechanical AVR on 5/12. ENT called to evaluate ?lesion in throat. Per team, pt was reported to have a lesion in his throat noted by anesthesia during intubation - no further information was able to be obtained. Pt denies fever, chills, n/v, HA, SOB, dysphagia, odynophagia, hemoptysis, hoarseness, unintentional weight loss. Pt is able to tolerate regular diet without issues.       PAST MEDICAL & SURGICAL HISTORY:  HTN (hypertension)      HLD (hyperlipidemia)      DM (diabetes mellitus)        Allergies    No Known Allergies    Intolerances      MEDICATIONS  (STANDING):  amLODIPine   Tablet 5 milliGRAM(s) Oral daily  aspirin enteric coated 81 milliGRAM(s) Oral daily  bisacodyl Suppository 10 milliGRAM(s) Rectal once  chlorhexidine 2% Cloths 1 Application(s) Topical daily  heparin   Injectable 5000 Unit(s) SubCutaneous every 8 hours  iron sucrose IVPB 200 milliGRAM(s) IV Intermittent every 24 hours  metoprolol tartrate 25 milliGRAM(s) Oral every 8 hours  pantoprazole    Tablet 40 milliGRAM(s) Oral before breakfast  polyethylene glycol 3350 17 Gram(s) Oral daily  senna 2 Tablet(s) Oral at bedtime  sodium chloride 0.9% lock flush 3 milliLiter(s) IV Push every 8 hours  warfarin 5 milliGRAM(s) Oral once    MEDICATIONS  (PRN):  acetaminophen     Tablet .. 650 milliGRAM(s) Oral every 6 hours PRN Mild Pain (1 - 3)  naloxone Injectable 0.1 milliGRAM(s) IV Push every 3 minutes PRN For ANY of the following changes in patient status:  A. RR LESS THAN 10 breaths per minute, B. Oxygen saturation LESS THAN 90%, C. Sedation score of 6  ondansetron Injectable 4 milliGRAM(s) IV Push every 6 hours PRN Nausea  oxyCODONE    IR 10 milliGRAM(s) Oral every 4 hours PRN Severe Pain (7 - 10)  oxyCODONE    IR 5 milliGRAM(s) Oral every 3 hours PRN Moderate Pain (4 - 6)  simethicone 80 milliGRAM(s) Chew every 4 hours PRN Gas      Social History: occasional marijuana smoker, denies tobacco use    Family history: Pt denies any significant family history     ROS:   ENT: all negative except as noted in HPI   CV: denies palpitations  Pulm: denies SOB, cough, hemoptysis  GI: denies change in apetite, indigestion, n/v  : denies pertinent urinary symptoms, urgency  Neuro: denies numbness/tingling, loss of sensation  Psych: denies anxiety  MS: denies muscle weakness, instability  Heme: denies easy bruising or bleeding  Endo: denies heat/cold intolerance, excessive sweating  Vascular: denies LE edema    Vital Signs Last 24 Hrs  T(C): 36.7 (17 May 2023 04:20), Max: 36.8 (16 May 2023 19:58)  T(F): 98.1 (17 May 2023 04:20), Max: 98.2 (16 May 2023 19:58)  HR: 83 (17 May 2023 04:20) (78 - 85)  BP: 114/80 (17 May 2023 04:20) (114/76 - 146/87)  BP(mean): 913 (17 May 2023 04:20) (913 - 913)  RR: 18 (17 May 2023 04:20) (18 - 18)  SpO2: 96% (17 May 2023 04:20) (95% - 97%)    Parameters below as of 17 May 2023 04:20  Patient On (Oxygen Delivery Method): room air                              8.5    12.18 )-----------( 349      ( 17 May 2023 05:50 )             26.4    05-17    137  |  97  |  13  ----------------------------<  95  3.6   |  26  |  0.84    Ca    9.1      17 May 2023 05:49  Phos  3.4     05-17  Mg     1.8     05-17     PT/INR - ( 17 May 2023 05:50 )   PT: 22.6 sec;   INR: 1.95 ratio             PHYSICAL EXAM:  Gen: NAD  Skin: No rashes, bruises, or lesions  Head: Normocephalic, Atraumatic  Face: no edema, erythema, or fluctuance. Parotid glands soft without mass  Eyes: no scleral injection  Nose: Nares bilaterally patent, no discharge  Mouth: No Stridor / Drooling / Trismus.  Mucosa moist, tongue/uvula midline, oropharynx clear  Neck: Flat, supple, no lymphadenopathy, trachea midline, no masses  Lymphatic: No lymphadenopathy  Resp: breathing easily, no stridor  CV: no peripheral edema/cyanosis  GI: nondistended   Peripheral vascular: no JVD or edema  Neuro: facial nerve intact, no facial droop      Fiberoptic Indirect Laryngoscopy (Ambu scope used):    Reason for Laryngoscopy: ?lesion in throat    Patient was unable to cooperate with mirror.  Nasopharynx, oropharynx, and hypopharynx clear, no bleeding. Tongue base, posterior pharyngeal wall, vallecula, epiglottis, and subglottis appear normal. No erythema, edema, pooling of secretions, masses or lesions. Airway patent, no foreign body visualized. No glottic/supraglottic edema. True vocal cords, arytenoids, vestibular folds, ventricles, pyriform sinuses, and aryepiglottic folds appear normal bilaterally. Vocal cords mobile with good contact b/l.

## 2023-05-17 NOTE — CONSULT NOTE ADULT - ASSESSMENT
37 year old was treated with 4 weeks of ceftriaxone for strep endocarditis after being hospitalized at . Prior to presentation has weeks of fever, chills and anorexia.   completed antibiotics and midline was pulled on april 10  Has had no systemic symptoms at all but develops sob and was admitted with  severe AI . Denies IVDA, recent dental work but has had dental issues in the past.    Likely cured endocarditis with residual AI  BC pending  No signs or symptoms of CNS disease  will get micro  results from .  reasonable to hold antibiotics atthis time       
 38yo male with PMHx DM, HTN, HLD, admitted for endocarditis, s/p mechanical AVR on 5/12. ENT called to evaluate ?lesion in throat. Per team, pt was reported to have a lesion in his throat noted by anesthesia during intubation - no further information was able to be obtained. Pt denies fever, chills, n/v, HA, SOB, dysphagia, odynophagia, hemoptysis, hoarseness. Indirect laryngoscopy unremarkable.

## 2023-05-17 NOTE — PROGRESS NOTE ADULT - PROBLEM SELECTOR PROBLEM 1
S/P AVR (aortic valve replacement)
S/P AVR (aortic valve replacement)
Severe aortic regurgitation
S/P AVR (aortic valve replacement)
S/P AVR (aortic valve replacement)

## 2023-05-17 NOTE — PROGRESS NOTE ADULT - SUBJECTIVE AND OBJECTIVE BOX
subjective ' hello I am feeling better"    VITAL SIGNS    Telemetry:  NSR 70-90    Vital Signs Last 24 Hrs  T(C): 36.7 (23 @ 04:20), Max: 36.8 (23 @ 19:58)  T(F): 98.1 (23 @ 04:20), Max: 98.2 (23 @ 19:58)  HR: 83 (23 @ 04:20) (78 - 85)  BP: 114/80 (23 @ 04:20) (114/76 - 146/87)      RR: 18 (23 @ 04:20) (18 - 18)  SpO2: 96% (23 @ 04:20) (95% - 97%)              @ 07:01  -   @ 07:00  --------------------------------------------------------  IN: 660 mL / OUT: 1610 mL / NET: -950 mL     @ 07:01  -   @ 11:31  --------------------------------------------------------  IN: 120 mL / OUT: 0 mL / NET: 120 mL    Daily Weight in k.2 (17 May 2023 07:27)                          8.5    12.18 )-----------( 349      ( 17 May 2023 05:50 )             26.4       137  |  97  |  13  ----------------------------<  95  3.6   |  26  |  0.84    Ca    9.1      17 May 2023 05:49  Phos  3.4     -  Mg     1.8           CAPILLARY BLOOD GLUCOSE  MEDICATIONS  (STANDING):  amLODIPine   Tablet 5 milliGRAM(s) Oral daily  aspirin enteric coated 81 milliGRAM(s) Oral daily  bisacodyl Suppository 10 milliGRAM(s) Rectal once  chlorhexidine 2% Cloths 1 Application(s) Topical daily  heparin   Injectable 5000 Unit(s) SubCutaneous every 8 hours  iron sucrose IVPB 200 milliGRAM(s) IV Intermittent every 24 hours  metoprolol tartrate 25 milliGRAM(s) Oral every 8 hours  pantoprazole    Tablet 40 milliGRAM(s) Oral before breakfast  polyethylene glycol 3350 17 Gram(s) Oral daily  potassium bicarbonate/potassium citrate 25 milliEquivalent(s) Oral every 1 hour  senna 2 Tablet(s) Oral at bedtime  sodium chloride 0.9% lock flush 3 milliLiter(s) IV Push every 8 hours  warfarin 5 milliGRAM(s) Oral once    MEDICATIONS  (PRN):  acetaminophen     Tablet .. 650 milliGRAM(s) Oral every 6 hours PRN Mild Pain (1 - 3)  naloxone Injectable 0.1 milliGRAM(s) IV Push every 3 minutes PRN For ANY of the following changes in patient status:  A. RR LESS THAN 10 breaths per minute, B. Oxygen saturation LESS THAN 90%, C. Sedation score of 6  ondansetron Injectable 4 milliGRAM(s) IV Push every 6 hours PRN Nausea  oxyCODONE    IR 5 milliGRAM(s) Oral every 3 hours PRN Moderate Pain (4 - 6)  oxyCODONE    IR 10 milliGRAM(s) Oral every 4 hours PRN Severe Pain (7 - 10)  simethicone 80 milliGRAM(s) Chew every 4 hours PRN Gas           PHYSICAL EXAM        Neurology: alert and oriented x 3, nonfocal, no gross deficits    CV :[id number]    Sternal Wound : JOEY sternum stable     Lungs:  B/l cta on room air     Abdomen: soft, nontender, nondistended, positive bowel sounds, + BM    : voids              Extremities:   equal strength throughout    b/lle = DP no calf tenderness                                          Physical Therapy Rec:   Home  [ x ]   Home w/ PT  [  ]  Rehab  [  ]    Discussed with Cardiothoracic Team at AM rounds.

## 2023-05-18 ENCOUNTER — TRANSCRIPTION ENCOUNTER (OUTPATIENT)
Age: 38
End: 2023-05-18

## 2023-05-18 VITALS
RESPIRATION RATE: 18 BRPM | HEART RATE: 84 BPM | DIASTOLIC BLOOD PRESSURE: 70 MMHG | TEMPERATURE: 99 F | SYSTOLIC BLOOD PRESSURE: 120 MMHG | OXYGEN SATURATION: 94 %

## 2023-05-18 LAB
ALBUMIN SERPL ELPH-MCNC: 3.8 G/DL — SIGNIFICANT CHANGE UP (ref 3.3–5)
ALP SERPL-CCNC: 54 U/L — SIGNIFICANT CHANGE UP (ref 40–120)
ALT FLD-CCNC: 39 U/L — SIGNIFICANT CHANGE UP (ref 10–45)
ANION GAP SERPL CALC-SCNC: 12 MMOL/L — SIGNIFICANT CHANGE UP (ref 5–17)
APTT BLD: 36.7 SEC — HIGH (ref 27.5–35.5)
AST SERPL-CCNC: 26 U/L — SIGNIFICANT CHANGE UP (ref 10–40)
BASOPHILS # BLD AUTO: 0 K/UL — SIGNIFICANT CHANGE UP (ref 0–0.2)
BASOPHILS NFR BLD AUTO: 0 % — SIGNIFICANT CHANGE UP (ref 0–2)
BILIRUB SERPL-MCNC: 0.5 MG/DL — SIGNIFICANT CHANGE UP (ref 0.2–1.2)
BUN SERPL-MCNC: 12 MG/DL — SIGNIFICANT CHANGE UP (ref 7–23)
CALCIUM SERPL-MCNC: 9.4 MG/DL — SIGNIFICANT CHANGE UP (ref 8.4–10.5)
CHLORIDE SERPL-SCNC: 95 MMOL/L — LOW (ref 96–108)
CO2 SERPL-SCNC: 25 MMOL/L — SIGNIFICANT CHANGE UP (ref 22–31)
CREAT SERPL-MCNC: 0.85 MG/DL — SIGNIFICANT CHANGE UP (ref 0.5–1.3)
EGFR: 114 ML/MIN/1.73M2 — SIGNIFICANT CHANGE UP
EOSINOPHIL # BLD AUTO: 0.21 K/UL — SIGNIFICANT CHANGE UP (ref 0–0.5)
EOSINOPHIL NFR BLD AUTO: 1.7 % — SIGNIFICANT CHANGE UP (ref 0–6)
GLUCOSE SERPL-MCNC: 101 MG/DL — HIGH (ref 70–99)
HCT VFR BLD CALC: 29.5 % — LOW (ref 39–50)
HGB BLD-MCNC: 9.3 G/DL — LOW (ref 13–17)
INR BLD: 1.77 RATIO — HIGH (ref 0.88–1.16)
INR BLD: 2.08 RATIO — HIGH (ref 0.88–1.16)
LYMPHOCYTES # BLD AUTO: 2.99 K/UL — SIGNIFICANT CHANGE UP (ref 1–3.3)
LYMPHOCYTES # BLD AUTO: 24.4 % — SIGNIFICANT CHANGE UP (ref 13–44)
MAGNESIUM SERPL-MCNC: 1.9 MG/DL — SIGNIFICANT CHANGE UP (ref 1.6–2.6)
MANUAL SMEAR VERIFICATION: SIGNIFICANT CHANGE UP
MCHC RBC-ENTMCNC: 26.9 PG — LOW (ref 27–34)
MCHC RBC-ENTMCNC: 31.5 GM/DL — LOW (ref 32–36)
MCV RBC AUTO: 85.3 FL — SIGNIFICANT CHANGE UP (ref 80–100)
MONOCYTES # BLD AUTO: 2.34 K/UL — HIGH (ref 0–0.9)
MONOCYTES NFR BLD AUTO: 19.1 % — HIGH (ref 2–14)
NEUTROPHILS # BLD AUTO: 6.6 K/UL — SIGNIFICANT CHANGE UP (ref 1.8–7.4)
NEUTROPHILS NFR BLD AUTO: 53.9 % — SIGNIFICANT CHANGE UP (ref 43–77)
PHOSPHATE SERPL-MCNC: 3.4 MG/DL — SIGNIFICANT CHANGE UP (ref 2.5–4.5)
PLAT MORPH BLD: NORMAL — SIGNIFICANT CHANGE UP
PLATELET # BLD AUTO: 409 K/UL — HIGH (ref 150–400)
POLYCHROMASIA BLD QL SMEAR: SLIGHT — SIGNIFICANT CHANGE UP
POTASSIUM SERPL-MCNC: 4 MMOL/L — SIGNIFICANT CHANGE UP (ref 3.5–5.3)
POTASSIUM SERPL-SCNC: 4 MMOL/L — SIGNIFICANT CHANGE UP (ref 3.5–5.3)
PROT SERPL-MCNC: 7.6 G/DL — SIGNIFICANT CHANGE UP (ref 6–8.3)
PROTHROM AB SERPL-ACNC: 20.7 SEC — HIGH (ref 10.5–13.4)
PROTHROM AB SERPL-ACNC: 24.1 SEC — HIGH (ref 10.5–13.4)
RBC # BLD: 3.46 M/UL — LOW (ref 4.2–5.8)
RBC # FLD: 14.2 % — SIGNIFICANT CHANGE UP (ref 10.3–14.5)
RBC BLD AUTO: SIGNIFICANT CHANGE UP
SODIUM SERPL-SCNC: 132 MMOL/L — LOW (ref 135–145)
VARIANT LYMPHS # BLD: 0.9 % — SIGNIFICANT CHANGE UP (ref 0–6)
WBC # BLD: 12.25 K/UL — HIGH (ref 3.8–10.5)
WBC # FLD AUTO: 12.25 K/UL — HIGH (ref 3.8–10.5)

## 2023-05-18 PROCEDURE — 83036 HEMOGLOBIN GLYCOSYLATED A1C: CPT

## 2023-05-18 PROCEDURE — 85014 HEMATOCRIT: CPT

## 2023-05-18 PROCEDURE — C1889: CPT

## 2023-05-18 PROCEDURE — 87075 CULTR BACTERIA EXCEPT BLOOD: CPT

## 2023-05-18 PROCEDURE — 93005 ELECTROCARDIOGRAM TRACING: CPT

## 2023-05-18 PROCEDURE — 94010 BREATHING CAPACITY TEST: CPT

## 2023-05-18 PROCEDURE — 93308 TTE F-UP OR LMTD: CPT

## 2023-05-18 PROCEDURE — 85384 FIBRINOGEN ACTIVITY: CPT

## 2023-05-18 PROCEDURE — 86891 AUTOLOGOUS BLOOD OP SALVAGE: CPT

## 2023-05-18 PROCEDURE — 71045 X-RAY EXAM CHEST 1 VIEW: CPT

## 2023-05-18 PROCEDURE — 84134 ASSAY OF PREALBUMIN: CPT

## 2023-05-18 PROCEDURE — C1769: CPT

## 2023-05-18 PROCEDURE — 99232 SBSQ HOSP IP/OBS MODERATE 35: CPT

## 2023-05-18 PROCEDURE — 84439 ASSAY OF FREE THYROXINE: CPT

## 2023-05-18 PROCEDURE — 80048 BASIC METABOLIC PNL TOTAL CA: CPT

## 2023-05-18 PROCEDURE — 86850 RBC ANTIBODY SCREEN: CPT

## 2023-05-18 PROCEDURE — 84100 ASSAY OF PHOSPHORUS: CPT

## 2023-05-18 PROCEDURE — 86901 BLOOD TYPING SEROLOGIC RH(D): CPT

## 2023-05-18 PROCEDURE — 71046 X-RAY EXAM CHEST 2 VIEWS: CPT

## 2023-05-18 PROCEDURE — 85018 HEMOGLOBIN: CPT

## 2023-05-18 PROCEDURE — 85396 CLOTTING ASSAY WHOLE BLOOD: CPT

## 2023-05-18 PROCEDURE — 84132 ASSAY OF SERUM POTASSIUM: CPT

## 2023-05-18 PROCEDURE — 87640 STAPH A DNA AMP PROBE: CPT

## 2023-05-18 PROCEDURE — 82962 GLUCOSE BLOOD TEST: CPT

## 2023-05-18 PROCEDURE — 85025 COMPLETE CBC W/AUTO DIFF WBC: CPT

## 2023-05-18 PROCEDURE — 84443 ASSAY THYROID STIM HORMONE: CPT

## 2023-05-18 PROCEDURE — 85610 PROTHROMBIN TIME: CPT

## 2023-05-18 PROCEDURE — 84295 ASSAY OF SERUM SODIUM: CPT

## 2023-05-18 PROCEDURE — 97165 OT EVAL LOW COMPLEX 30 MIN: CPT

## 2023-05-18 PROCEDURE — 93321 DOPPLER ECHO F-UP/LMTD STD: CPT

## 2023-05-18 PROCEDURE — 86923 COMPATIBILITY TEST ELECTRIC: CPT

## 2023-05-18 PROCEDURE — 93880 EXTRACRANIAL BILAT STUDY: CPT

## 2023-05-18 PROCEDURE — C1751: CPT

## 2023-05-18 PROCEDURE — 81001 URINALYSIS AUTO W/SCOPE: CPT

## 2023-05-18 PROCEDURE — 75574 CT ANGIO HRT W/3D IMAGE: CPT

## 2023-05-18 PROCEDURE — 82947 ASSAY GLUCOSE BLOOD QUANT: CPT

## 2023-05-18 PROCEDURE — 97162 PT EVAL MOD COMPLEX 30 MIN: CPT

## 2023-05-18 PROCEDURE — 87040 BLOOD CULTURE FOR BACTERIA: CPT

## 2023-05-18 PROCEDURE — 82803 BLOOD GASES ANY COMBINATION: CPT

## 2023-05-18 PROCEDURE — 86900 BLOOD TYPING SEROLOGIC ABO: CPT

## 2023-05-18 PROCEDURE — 85730 THROMBOPLASTIN TIME PARTIAL: CPT

## 2023-05-18 PROCEDURE — 71046 X-RAY EXAM CHEST 2 VIEWS: CPT | Mod: 26

## 2023-05-18 PROCEDURE — 93306 TTE W/DOPPLER COMPLETE: CPT

## 2023-05-18 PROCEDURE — 85027 COMPLETE CBC AUTOMATED: CPT

## 2023-05-18 PROCEDURE — 70496 CT ANGIOGRAPHY HEAD: CPT

## 2023-05-18 PROCEDURE — 97116 GAIT TRAINING THERAPY: CPT

## 2023-05-18 PROCEDURE — 82330 ASSAY OF CALCIUM: CPT

## 2023-05-18 PROCEDURE — 87641 MR-STAPH DNA AMP PROBE: CPT

## 2023-05-18 PROCEDURE — 83735 ASSAY OF MAGNESIUM: CPT

## 2023-05-18 PROCEDURE — 83605 ASSAY OF LACTIC ACID: CPT

## 2023-05-18 PROCEDURE — 87070 CULTURE OTHR SPECIMN AEROBIC: CPT

## 2023-05-18 PROCEDURE — 36415 COLL VENOUS BLD VENIPUNCTURE: CPT

## 2023-05-18 PROCEDURE — 88305 TISSUE EXAM BY PATHOLOGIST: CPT

## 2023-05-18 PROCEDURE — 82565 ASSAY OF CREATININE: CPT

## 2023-05-18 PROCEDURE — 82435 ASSAY OF BLOOD CHLORIDE: CPT

## 2023-05-18 PROCEDURE — 80053 COMPREHEN METABOLIC PANEL: CPT

## 2023-05-18 PROCEDURE — 83880 ASSAY OF NATRIURETIC PEPTIDE: CPT

## 2023-05-18 PROCEDURE — 93356 MYOCRD STRAIN IMG SPCKL TRCK: CPT

## 2023-05-18 PROCEDURE — C9399: CPT

## 2023-05-18 RX ORDER — OXYCODONE HYDROCHLORIDE 5 MG/1
1 TABLET ORAL
Qty: 28 | Refills: 0
Start: 2023-05-18 | End: 2023-05-24

## 2023-05-18 RX ORDER — ACETAMINOPHEN 500 MG
2 TABLET ORAL
Qty: 42 | Refills: 0
Start: 2023-05-18 | End: 2023-05-24

## 2023-05-18 RX ORDER — WARFARIN SODIUM 2.5 MG/1
1 TABLET ORAL
Qty: 30 | Refills: 0
Start: 2023-05-18 | End: 2023-06-16

## 2023-05-18 RX ORDER — PANTOPRAZOLE SODIUM 20 MG/1
1 TABLET, DELAYED RELEASE ORAL
Qty: 30 | Refills: 0
Start: 2023-05-18 | End: 2023-06-16

## 2023-05-18 RX ORDER — OXYCODONE AND ACETAMINOPHEN 5; 325 MG/1; MG/1
1 TABLET ORAL
Qty: 28 | Refills: 0
Start: 2023-05-18 | End: 2023-05-24

## 2023-05-18 RX ORDER — ASPIRIN/CALCIUM CARB/MAGNESIUM 324 MG
1 TABLET ORAL
Qty: 30 | Refills: 0
Start: 2023-05-18 | End: 2023-06-16

## 2023-05-18 RX ORDER — SENNA PLUS 8.6 MG/1
2 TABLET ORAL
Qty: 14 | Refills: 0
Start: 2023-05-18 | End: 2023-05-24

## 2023-05-18 RX ORDER — METOPROLOL TARTRATE 50 MG
1 TABLET ORAL
Qty: 30 | Refills: 0
Start: 2023-05-18 | End: 2023-06-16

## 2023-05-18 RX ADMIN — IRON SUCROSE 110 MILLIGRAM(S): 20 INJECTION, SOLUTION INTRAVENOUS at 11:00

## 2023-05-18 RX ADMIN — SODIUM CHLORIDE 3 MILLILITER(S): 9 INJECTION INTRAMUSCULAR; INTRAVENOUS; SUBCUTANEOUS at 13:18

## 2023-05-18 RX ADMIN — OXYCODONE HYDROCHLORIDE 10 MILLIGRAM(S): 5 TABLET ORAL at 08:24

## 2023-05-18 RX ADMIN — Medication 50 MILLIGRAM(S): at 05:59

## 2023-05-18 RX ADMIN — OXYCODONE HYDROCHLORIDE 10 MILLIGRAM(S): 5 TABLET ORAL at 13:48

## 2023-05-18 RX ADMIN — OXYCODONE HYDROCHLORIDE 10 MILLIGRAM(S): 5 TABLET ORAL at 00:54

## 2023-05-18 RX ADMIN — OXYCODONE HYDROCHLORIDE 10 MILLIGRAM(S): 5 TABLET ORAL at 05:16

## 2023-05-18 RX ADMIN — OXYCODONE HYDROCHLORIDE 10 MILLIGRAM(S): 5 TABLET ORAL at 04:16

## 2023-05-18 RX ADMIN — PANTOPRAZOLE SODIUM 40 MILLIGRAM(S): 20 TABLET, DELAYED RELEASE ORAL at 05:59

## 2023-05-18 RX ADMIN — OXYCODONE HYDROCHLORIDE 10 MILLIGRAM(S): 5 TABLET ORAL at 08:54

## 2023-05-18 RX ADMIN — SODIUM CHLORIDE 3 MILLILITER(S): 9 INJECTION INTRAMUSCULAR; INTRAVENOUS; SUBCUTANEOUS at 05:52

## 2023-05-18 RX ADMIN — OXYCODONE HYDROCHLORIDE 10 MILLIGRAM(S): 5 TABLET ORAL at 14:18

## 2023-05-18 RX ADMIN — Medication 81 MILLIGRAM(S): at 13:57

## 2023-05-18 NOTE — DISCHARGE NOTE PROVIDER - HOSPITAL COURSE
37M PMHx: DM, HTN, HLD, "heart murmur secondary to an infection in my blood (3/2023)," Now presents to [facility] from [facility], for Severe AI/endocarditis eval. Patient initially presented to [facility] ER for evaluation of cough and shortness of breath x 4 days. Patient states that over the course the last 4 days he has been experiencing shortness of breath as well as cough productive of white to yellow sputum, and states that there was a small blood streak in the sputum today. Patient states that he has been experiencing chest discomfort for the last 2 days as well, and points to the epigastric and left side of the chest when asked where the pain is localized. Patient states that the shortness of breath is worse when laying down, and states he has never had this before. PE ruled out at [facility] and Negative troponin Of note Patient states he has step bacteremia in [facility] in March 2023 and was send for on antibiotics which he completed in April 2023.  Found to have severe AI, transferred to [facility] for cardiac surgery evaluation with Dr. Chavez.     Hospital Course:  5/10 ID consult called and appreciated for r/o encarditis (Dr. Curtis).  Dental Consult called and appreciated to r/o potential source.  BC x 2 results pending. CTA head ordered to r/o mycotic aneurysm. TTE ordered.  CT of heart with cors ordered for pre op cardiac surgery work up. OR tentative for Friday 5/12.     5/12 OR for Mechanical AVR  (Nash 23mm) /w Dr. Chavez. No intra-op blood products.  Extubated in OR.  Coumadin 5mg  Post op Course:  5/13 Coumadin 5mg. Lopressor 25mg Q8 started  5/14 Zendejas Dc'd-- voiding Mediastinal chest tube x1 DC'd. Coumadin 5mg tonight for INR 1.42.  TX to floor.  No BM post op yet.  Pain well controlled on PCA.  Ambulated x 1 while in ICU  5/15 VSS, mediastinal chest tube output 100cc/24h - d/c today per Dr. Chavez, keep phuong drain. Pt ambulating without assist. PCA pump dc'd by anesthesia. INR, Coumadin.   5/16 VVS; Continue on current medication regimen.  PW D/C'd.  Phuong drain to be removed later this afternoon.  INR 2.05 --> Coumadin 2.5 mg PO tonight.  Nutrition consult for coumadin / diet education.    5/17 VSS INR 1.95 Coumadin 5mg tonight ENT consulted evaluate throat  lesion seen on intubation- ECHO today   Plan for home PT in AM.     5/18 no findings on indirect laryngoscopy  - no ENT intervention required. < from: TTE W or WO Ultrasound Enhancing Agent (05.17.23 @ 11:19) >    FINDINGS:  Left Ventricle:  Normal left ventricular cavity size. The left ventricular systolic function is normal with a calculated ejection fraction of 61 % by the Melchor's biplane method of disks.     Right Ventricle:  Normal right ventricular cavity size, normal wall thickness and normal systolic function.     Aortic Valve:  A mechanical valve replacement present in the aortic position. The aortic valve prosthesis is well seated with normal function. There is trace valvular regurgitation.     Mitral Valve:  There is trace mitral regurgitation.     Tricuspid Valve:  Structurally normal tricuspid valve with normal leaflet excursion.     Pulmonic Valve:  Structurally normal pulmonic valve with normal leaflet excursion.     Pericardium:  No pericardial effusion seen.    INR 1.66 will repeat this afternoon. INR check scheduled 5/22 with PCP

## 2023-05-18 NOTE — PROGRESS NOTE ADULT - PROVIDER SPECIALTY LIST ADULT
Anesthesia
CT Surgery
Cardiology
Cardiology
Infectious Disease
Anesthesia
Anesthesia
CT Surgery
Cardiology
Cardiology
Infectious Disease
Anesthesia
CT Surgery
Cardiology
Cardiology
Critical Care
Infectious Disease
CT Surgery
Infectious Disease
CT Surgery
CT Surgery

## 2023-05-18 NOTE — DISCHARGE NOTE PROVIDER - CARE PROVIDER_API CALL
Kindra Chavez)  Surgery; Thoracic and Cardiac Surgery  [address] [place] [address]  Phone: [phone]  Fax: [phone]  Follow Up Time: 2 weeks    BEBETO KAMINSKI  [facility]  Phone: [phone]  Fax: ([phone]  Scheduled Appointment: 05/22/2023 01:30 PM

## 2023-05-18 NOTE — PROGRESS NOTE ADULT - SUBJECTIVE AND OBJECTIVE BOX
Cardiovascular Disease Progress Note  Date of service: 05-18-23 @ 07:47    Overnight events: No acute events overnight.  Pt is in no distress.   Otherwise review of systems negative    Objective Findings:  T(C): 37 (05-18-23 @ 04:30), Max: 37.5 (05-17-23 @ 19:04)  HR: 81 (05-18-23 @ 04:30) (79 - 81)  BP: 137/89 (05-18-23 @ 04:30) (117/80 - 152/94)  RR: 18 (05-18-23 @ 04:30) (18 - 18)  SpO2: 98% (05-18-23 @ 04:30) (97% - 98%)  Wt(kg): --  Daily     Daily       Physical Exam:  Gen: NAD; Patient resting comfortably  HEENT: EOMI, Normocephalic/ atraumatic  CV: RRR, normal S1 + S2, no m/r/g  Lungs:  Normal respiratory effort; clear to auscultation bilaterally  Abd: soft, non-tender; bowel sounds present  Ext: No edema; warm and well perfused     Telemetry: Sinus    Laboratory Data:                        9.3    12.25 )-----------( 409      ( 18 May 2023 05:43 )             29.5     05-18    132<L>  |  95<L>  |  12  ----------------------------<  101<H>  4.0   |  25  |  0.85    Ca    9.4      18 May 2023 05:43  Phos  3.4     05-18  Mg     1.9     05-18    TPro  7.6  /  Alb  3.8  /  TBili  0.5  /  DBili  x   /  AST  26  /  ALT  39  /  AlkPhos  54  05-18    PT/INR - ( 18 May 2023 05:43 )   PT: 20.7 sec;   INR: 1.77 ratio                   Inpatient Medications:  MEDICATIONS  (STANDING):  aspirin enteric coated 81 milliGRAM(s) Oral daily  bisacodyl Suppository 10 milliGRAM(s) Rectal once  iron sucrose IVPB 200 milliGRAM(s) IV Intermittent every 24 hours  metoprolol succinate ER 50 milliGRAM(s) Oral daily  pantoprazole    Tablet 40 milliGRAM(s) Oral before breakfast  polyethylene glycol 3350 17 Gram(s) Oral daily  senna 2 Tablet(s) Oral at bedtime  sodium chloride 0.9% lock flush 3 milliLiter(s) IV Push every 8 hours      Assessment:  37 year old man with HTN and HLD transferred for severe aortic regurgitation in the setting of bacterial endocarditis.     Plan of Care:    #Severe aortic regurgitation-  Prolapsed RCC in the setting of strep bacterial endocarditis, as per [facility].  S/p mechanical AVR on 5/12/2023.   TTE 5/17/2023 shows normal LV systolic function with a well seated prosthetic aortic valve with normal function.   Patient well perfused and lactate WNL off of inotropic support.  ID input noted.  Warfarin dosing as per the CT surgery team.       #HTN  - Continue current regimen      Patient will need to establish a PMD for outpatient INR monitoring.     #ACP (advance care planning)-  Advanced care planning was discussed with the patient.  Risks, benefits and alternatives of medical treatment and procedures were discussed in detail and all questions were answered. 30 additional minutes spent addressing advance care plans.          Over 25 minutes spent on total encounter; more than 50% of the visit was spent counseling and/or coordinating care by the attending physician.      Virgil Cutler,  [facility]  Cardiovascular Disease  [phone]

## 2023-05-18 NOTE — DISCHARGE NOTE PROVIDER - PROVIDER TOKENS
PROVIDER:[TOKEN:[[id number]],FOLLOWUP:[2 weeks]],PROVIDER:[TOKEN:[[id number]],SCHEDULEDAPPT:[05/22/2023],SCHEDULEDAPPTTIME:[01:30 PM]]

## 2023-05-18 NOTE — PROGRESS NOTE ADULT - SUBJECTIVE AND OBJECTIVE BOX
infectious diseases progress note:    Patient is a 37y old  Male who presents with a chief complaint of Endocarditis eval (18 May 2023 07:46)        Atherosclerosis of native coronary artery without angina pectoris             Allergies    No Known Allergies    Intolerances        ANTIBIOTICS/RELEVANT:  antimicrobials    immunologic:    OTHER:  acetaminophen     Tablet .. 650 milliGRAM(s) Oral every 6 hours PRN  aspirin enteric coated 81 milliGRAM(s) Oral daily  bisacodyl Suppository 10 milliGRAM(s) Rectal once  iron sucrose IVPB 200 milliGRAM(s) IV Intermittent every 24 hours  metoprolol succinate ER 50 milliGRAM(s) Oral daily  naloxone Injectable 0.1 milliGRAM(s) IV Push every 3 minutes PRN  ondansetron Injectable 4 milliGRAM(s) IV Push every 6 hours PRN  oxyCODONE    IR 5 milliGRAM(s) Oral every 3 hours PRN  oxyCODONE    IR 10 milliGRAM(s) Oral every 4 hours PRN  pantoprazole    Tablet 40 milliGRAM(s) Oral before breakfast  polyethylene glycol 3350 17 Gram(s) Oral daily  senna 2 Tablet(s) Oral at bedtime  simethicone 80 milliGRAM(s) Chew every 4 hours PRN  sodium chloride 0.9% lock flush 3 milliLiter(s) IV Push every 8 hours      Objective:  Vital Signs Last 24 Hrs  T(C): 37 (18 May 2023 04:30), Max: 37.5 (17 May 2023 19:04)  T(F): 98.6 (18 May 2023 04:30), Max: 99.5 (17 May 2023 19:04)  HR: 81 (18 May 2023 04:30) (79 - 81)  BP: 137/89 (18 May 2023 04:30) (117/80 - 152/94)  BP(mean): 105 (18 May 2023 04:30) (105 - 105)  RR: 18 (18 May 2023 04:30) (18 - 18)  SpO2: 98% (18 May 2023 04:30) (97% - 98%)    Parameters below as of 18 May 2023 04:30  Patient On (Oxygen Delivery Method): room air           Eyes:LORNE, EOMI  Ear/Nose/Throat: no oral lesion, no sinus tenderness on percussion	  Neck:no JVD, no lymphadenopathy, supple  Respiratory: CTA kay  Cardiovascular: S1S2 RRR, no murmurs  Gastrointestinal:soft, (+) BS, no HSM  Extremities:no e/e/c        LABS:                        9.3    12.25 )-----------( 409      ( 18 May 2023 05:43 )             29.5     05-18    132<L>  |  95<L>  |  12  ----------------------------<  101<H>  4.0   |  25  |  0.85    Ca    9.4      18 May 2023 05:43  Phos  3.4     05-18  Mg     1.9     05-18    TPro  7.6  /  Alb  3.8  /  TBili  0.5  /  DBili  x   /  AST  26  /  ALT  39  /  AlkPhos  54  05-18    PT/INR - ( 18 May 2023 05:43 )   PT: 20.7 sec;   INR: 1.77 ratio                 MICROBIOLOGY:    RECENT CULTURES:  05-12 @ 14:57 .Tissue Other       No polymorphonuclear leukocytes seen per low power field  No organisms seen per oil power field           No growth at 5 days          RESPIRATORY CULTURES:              RADIOLOGY & ADDITIONAL STUDIES:        Pager [phone]  After 5 pm/weekends or if no response :[phone]

## 2023-05-18 NOTE — DISCHARGE NOTE PROVIDER - NSDCFUADDINST_GEN_ALL_CORE_FT
Resume activity as tolerated  Resume low cholesterol low sodium diet  Shower daily and wash all incisions with soap and water  Ambulate at least four times daily  Use incentive spirometer every hour during the day  Record daily weight and report changes greater than 3lbs  Please follow up with your cardiologist in 7-10 days  Please follow up with Dr Chavez in 2 weeks. Please call for an appointment  Please follow up with PCP 5/22 for blood draw. INR level and coumadin dosing to be managed by Dr Frias [phone]   Resume activity as tolerated  Resume low cholesterol low sodium diet  Shower daily and wash all incisions with soap and water  Ambulate at least four times daily  Use incentive spirometer every hour during the day  Record daily weight and report changes greater than 3lbs  Please follow up with your cardiologist in 7-10 days  Please follow up with Dr Chavez in 2 weeks. Please call for an appointment  Please follow up with PCP 5/22 for blood draw. INR level and coumadin dosing to be managed by Dr Frias [phone]  prophylactic antibiotics prior to invasive  dental procedures advised  Potential risk of uncontrolled bleeding while on anticoagulant. Please avoid contact sports or activities that may place you at risk for trauma.

## 2023-05-18 NOTE — DISCHARGE NOTE PROVIDER - NSDCPNSUBOBJ_GEN_ALL_CORE
Please check  first    Last visit:1/18//19  Next visit: 7/18/19  Previous refill 10/23/18(100+5R)    Requested Prescriptions     Pending Prescriptions Disp Refills    acetaminophen-codeine (TYLENOL-CODEINE #3) 300-30 mg per tablet 100 Tab 5     Sig: Take 2 Tabs by mouth every four (4) hours as needed for Pain. VITAL SIGNS    Telemetry:    Vital Signs Last 24 Hrs  T(C): 37 (23 @ 12:37), Max: 37.5 (23 @ 19:04)  T(F): 98.6 (23 @ 12:37), Max: 99.5 (23 @ 19:04)  HR: 84 (23 @ 12:37) (79 - 84)  BP: 120/70 (23 @ 12:37) (117/80 - 137/89)  RR: 18 (23 @ 12:37) (18 - 18)  SpO2: 94% (23 @ 12:37) (94% - 98%)             @ 07:01  -   @ 07:00  --------------------------------------------------------  IN: 730 mL / OUT: 1150 mL / NET: -420 mL     @ 07:01  -   @ 13:27  --------------------------------------------------------  IN: 0 mL / OUT: 900 mL / NET: -900 mL       Daily     Daily Weight in k (18 May 2023 08:37)  Admit Wt: Drug Dosing Weight  Height (cm): 167.6 (11 May 2023 18:13)  Weight (kg): 80.5 (11 May 2023 18:13)  BMI (kg/m2): 28.7 (11 May 2023 18:13)  BSA (m2): 1.9 (11 May 2023 18:13)    Bilirubin Total, Serum: 0.5 mg/dL ( @ 05:43)    CAPILLARY BLOOD GLUCOSE              MEDICATIONS  acetaminophen     Tablet .. 650 milliGRAM(s) Oral every 6 hours PRN  aspirin enteric coated 81 milliGRAM(s) Oral daily  bisacodyl Suppository 10 milliGRAM(s) Rectal once  metoprolol succinate ER 50 milliGRAM(s) Oral daily  naloxone Injectable 0.1 milliGRAM(s) IV Push every 3 minutes PRN  ondansetron Injectable 4 milliGRAM(s) IV Push every 6 hours PRN  oxyCODONE    IR 10 milliGRAM(s) Oral every 4 hours PRN  oxyCODONE    IR 5 milliGRAM(s) Oral every 3 hours PRN  pantoprazole    Tablet 40 milliGRAM(s) Oral before breakfast  polyethylene glycol 3350 17 Gram(s) Oral daily  senna 2 Tablet(s) Oral at bedtime  simethicone 80 milliGRAM(s) Chew every 4 hours PRN  sodium chloride 0.9% lock flush 3 milliLiter(s) IV Push every 8 hours      >>> <<<  PHYSICAL EXAM  Subjective: NAD  Neurology: alert and oriented x 3, nonfocal, no gross deficits  CV : s1s2  Sternal Wound :  CDI , Stable  Lungs: CTA  Abdomen: soft, NT,ND, ( +)BM  :  voiding  Extremities: -c/c/e      LABS      132<L>  |  95<L>  |  12  ----------------------------<  101<H>  4.0   |  25  |  0.85    Ca    9.4      18 May 2023 05:43  Phos  3.4       Mg     1.9         TPro  7.6  /  Alb  3.8  /  TBili  0.5  /  DBili  x   /  AST  26  /  ALT  39  /  AlkPhos  54                                   9.3    12.25 )-----------( 409      ( 18 May 2023 05:43 )             29.5          PT/INR - ( 18 May 2023 05:43 )   PT: 20.7 sec;   INR: 1.77 ratio                PAST MEDICAL & SURGICAL HISTORY:  HTN (hypertension)      HLD (hyperlipidemia)      DM (diabetes mellitus)

## 2023-05-18 NOTE — DISCHARGE NOTE PROVIDER - CARE PROVIDERS DIRECT ADDRESSES
[email or web address]

## 2023-05-18 NOTE — PROGRESS NOTE ADULT - ASSESSMENT
37 year old was treated with 4 weeks of ceftriaxone for strep endocarditis after being hospitalized at . Prior to presentation has weeks of fever, chills and anorexia.   completed antibiotics and midline was pulled on april 10  Has had no systemic symptoms at all but develops sob and was admitted with  severe AI . Denies IVDA, recent dental work but has had dental issues in the past.    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          
37 year old was treated with 4 weeks of ceftriaxone for strep endocarditis after being hospitalized at . Prior to presentation has weeks of fever, chills and anorexia.   completed antibiotics and midline was pulled on april 10  Has had no systemic symptoms at all but develops sob and was admitted with  severe AI . Denies IVDA, recent dental work but has had dental issues in the past.    Likely cured endocarditis with residual AI  BC  negative       called  It was a sensitive streptococcus  valve  nos no PMN  follow WBC  doing well post op  out of bed    culture for any clinical change   concern about the elevated wbc but no signs at all of infection    wbc seems to be coming down slowly  no symptoms of infection          
37 year old was treated with 4 weeks of ceftriaxone for strep endocarditis after being hospitalized at . Prior to presentation has weeks of fever, chills and anorexia.   completed antibiotics and midline was pulled on april 10  Has had no systemic symptoms at all but develops sob and was admitted with  severe AI . Denies IVDA, recent dental work but has had dental issues in the past.    Likely cured endocarditis with residual AI  BC  negative       called  It was a sensitive streptococcus  valve  nos no PMN  follow WBC  doing well post op  out of bed    culture for any clinical change   concern about the elevated wbc but no signs at all of infection  if it stay up, we would send BC          
37 year old was treated with 4 weeks of ceftriaxone for strep endocarditis after being hospitalized at . Prior to presentation has weeks of fever, chills and anorexia.   completed antibiotics and midline was pulled on april 10  Has had no systemic symptoms at all but develops sob and was admitted with  severe AI . Denies IVDA, recent dental work but has had dental issues in the past.    Likely cured endocarditis with residual AI  BC  negative       called  It was a sensitive streptococcus  valve  nos no PMN  follow WBC  doing well post op  out of bed in a chair   culture for any clincial change          
37 year old was treated with 4 weeks of ceftriaxone for strep endocarditis after being hospitalized at . Prior to presentation has weeks of fever, chills and anorexia.   completed antibiotics and midline was pulled on april 10  Has had no systemic symptoms at all but develops sob and was admitted with  severe AI . Denies IVDA, recent dental work but has had dental issues in the past.    Likely cured endocarditis with residual AI  BC  negative   No signs or symptoms of CNS disease  will get micro  results from .  reasonable to hold antibiotics atthis time     janak noted  for surgery/    
37M PMHx: DM, HTN, HLD, "heart murmur secondary to an infection in my blood (3/2023)," Now presents to [facility] from [facility], for Severe AI/endocarditis eval. Patient initially presented to [facility] ER for evaluation of cough and shortness of breath x 4 days. Patient states that over the course the last 4 days he has been experiencing shortness of breath as well as cough productive of white to yellow sputum, and states that there was a small blood streak in the sputum today. Patient states that he has been experiencing chest discomfort for the last 2 days as well, and points to the epigastric and left side of the chest when asked where the pain is localized. Patient states that the shortness of breath is worse when laying down, and states he has never had this before. PE ruled out at [facility] and Negative troponin Of note Patient states he has step bacteremia in [facility] in March 2023 and was send for on antibiotics which he completed in April 2023.  Found to have severe AI, transferred to [facility] for cardiac surgery evaluation with Dr. Chavez.     Hospital Course:   5/10 ID consult called and appreciated for r/o encarditis (Dr. Curtis).  Dental Consult called and appreciated to r/o potential source.  BC x 2 results pending. CTA head ordered to r/o mycotic aneurysm. TTE ordered.  CT of heart with cors ordered for pre op cardiac surgery work up. OR tentative for Friday 5/12.     5/12 OR for Mechanical AVR  ([place] 23mm) /w Dr. Chavez. No intra-op blood products.  Extubated in OR.  Coumadin 5mg    5/13 Coumadin 5mg. Lopressor 25mg Q8 started    5/14 Zendejas Dc'd--voiding Mediastinal chest tube x1 DC'd. Coumadin 5mg tonight for INR 1.42.  TX to floor.  No BM post op yet.  Pain well controlled on PCA.  Ambulated x 1 while in ICU
37 year old was treated with 4 weeks of ceftriaxone for strep endocarditis after being hospitalized at . Prior to presentation has weeks of fever, chills and anorexia.   completed antibiotics and midline was pulled on april 10  Has had no systemic symptoms at all but develops sob and was admitted with  severe AI . Denies IVDA, recent dental work but has had dental issues in the past.    Likely cured endocarditis with residual AI  BC  negative       called  It was a sensitive streptococcus  valve  nos no PMN  follow WBC  doing well post op  out of bed    culture for any clinical change   concern about the elevated wbc but no signs at all of infection    wbc seems to be coming down slowly  no symptoms of infection          
37M PMHx: DM, HTN, HLD, "heart murmur secondary to an infection in my blood (3/2023)," Now presents to [facility] from [facility], for Severe AI/endocarditis eval. Patient initially presented to [facility] ER for evaluation of cough and shortness of breath x 4 days. Patient states that over the course the last 4 days he has been experiencing shortness of breath as well as cough productive of white to yellow sputum, and states that there was a small blood streak in the sputum today. Patient states that he has been experiencing chest discomfort for the last 2 days as well, and points to the epigastric and left side of the chest when asked where the pain is localized. Patient states that the shortness of breath is worse when laying down, and states he has never had this before. PE ruled out at [facility] and Negative troponin Of note Patient states he has step bacteremia in [facility] in March 2023 and was send for on antibiotics which he completed in April 2023.  Found to have severe AI, transferred to [facility] for cardiac surgery evaluation with Dr. Chavez.     Hospital Course:  5/10 ID consult called and appreciated for r/o encarditis (Dr. Curtis).  Dental Consult called and appreciated to r/o potential source.  BC x 2 results pending. CTA head ordered to r/o mycotic aneurysm. TTE ordered.  CT of heart with cors ordered for pre op cardiac surgery work up. OR tentative for Friday 5/12.     5/12 OR for Mechanical AVR  ([place] 23mm) /w Dr. Chavez. No intra-op blood products.  Extubated in OR.  Coumadin 5mg  Post op Course:  5/13 Coumadin 5mg. Lopressor 25mg Q8 started  5/14 Zendejas Dc'd-- voiding Mediastinal chest tube x1 DC'd. Coumadin 5mg tonight for INR 1.42.  TX to floor.  No BM post op yet.  Pain well controlled on PCA.  Ambulated x 1 while in ICU  5/15 VSS, mediastinal chest tube output 100cc/24h - d/c today per Dr. Chavez, keep phuong drain. Pt ambulating without assist. PCA pump dc'd by anesthesia. INR, Coumadin.   5/16 VVS; Continue on current medication regimen.  PW D/C'd.  Phuong drain to be removed later this afternoon.  INR 2.05 --> Coumadin 2.5 mg PO tonight.  Nutrition consult for coumadin / diet education.    5/17 VSS INR 1.95 Coumadin 5mg tonight ENT consulted evaluate throat  lesion seen on intubation- ECHO today   Plan for home PT in AM. 
37M PMHx: DM, HTN, HLD, "heart murmur secondary to an infection in my blood (3/2023)," Now presents to [facility] from [facility], for Severe AI/endocarditis eval. Patient initially presented to [facility] ER for evaluation of cough and shortness of breath x 4 days. Patient states that over the course the last 4 days he has been experiencing shortness of breath as well as cough productive of white to yellow sputum, and states that there was a small blood streak in the sputum today. Patient states that he has been experiencing chest discomfort for the last 2 days as well, and points to the epigastric and left side of the chest when asked where the pain is localized. Patient states that the shortness of breath is worse when laying down, and states he has never had this before. PE ruled out at [facility] and Negative troponin Of note Patient states he has step bacteremia in [facility] in March 2023 and was send for on antibiotics which he completed in April 2023.  Found to have severe AI, transferred to [facility] for cardiac surgery evaluation with Dr. Chavez.     Hospital Course:   5/10 ID consult called and appreciated for r/o encarditis (Dr. Curtis).  Dental Consult called and appreciated to r/o potential source.  BC x 2 results pending. CTA head ordered to r/o mycotic aneurysm. TTE ordered.  CT of heart with cors ordered for pre op cardiac surgery work up. OR tentative for Friday 5/12.       
37M PMHx: DM, HTN, HLD, "heart murmur secondary to an infection in my blood (3/2023)," Now presents to [facility] from [facility], for Severe AI/endocarditis eval. Patient initially presented to [facility] ER for evaluation of cough and shortness of breath x 4 days. Patient states that over the course the last 4 days he has been experiencing shortness of breath as well as cough productive of white to yellow sputum, and states that there was a small blood streak in the sputum today. Patient states that he has been experiencing chest discomfort for the last 2 days as well, and points to the epigastric and left side of the chest when asked where the pain is localized. Patient states that the shortness of breath is worse when laying down, and states he has never had this before. PE ruled out at [facility] and Negative troponin Of note Patient states he has step bacteremia in [facility] in March 2023 and was send for on antibiotics which he completed in April 2023.  Found to have severe AI, transferred to [facility] for cardiac surgery evaluation with Dr. Chavez.     Hospital Course:   5/10 ID consult called and appreciated for r/o encarditis (Dr. Curtis).  Dental Consult called and appreciated to r/o potential source.  BC x 2 results pending. CTA head ordered to r/o mycotic aneurysm. TTE ordered.  CT of heart with cors ordered for pre op cardiac surgery work up. OR tentative for Friday 5/12.     5/12 OR for Mechanical AVR  ([place] 23mm) /w Dr. Chavez. No intra-op blood products.  Extubated in OR.  Coumadin 5mg  5/13 Coumadin 5mg. Lopressor 25mg Q8 started  5/14 Zendejas Dc'd--voiding Mediastinal chest tube x1 DC'd. Coumadin 5mg tonight for INR 1.42.  TX to floor.  No BM post op yet.  Pain well controlled on PCA.  Ambulated x 1 while in ICU  5/15 VSS, mediastinal chest tube output 100cc/24h - d/c today per Dr. Chavez, keep phuong drain. Pt ambulating without assist. PCA pump dc'd by anesthesia. INR, Coumadin. 
37M PMHx: DM, HTN, HLD, "heart murmur secondary to an infection in my blood (3/2023)," Now presents to [facility] from [facility], for Severe AI/endocarditis eval. Patient initially presented to [facility] ER for evaluation of cough and shortness of breath x 4 days. Patient states that over the course the last 4 days he has been experiencing shortness of breath as well as cough productive of white to yellow sputum, and states that there was a small blood streak in the sputum today. Patient states that he has been experiencing chest discomfort for the last 2 days as well, and points to the epigastric and left side of the chest when asked where the pain is localized. Patient states that the shortness of breath is worse when laying down, and states he has never had this before. PE ruled out at [facility] and Negative troponin Of note Patient states he has step bacteremia in [facility] in March 2023 and was send for on antibiotics which he completed in April 2023.  Found to have severe AI, transferred to [facility] for cardiac surgery evaluation with Dr. Chavez.     Hospital Course:  5/10 ID consult called and appreciated for r/o encarditis (Dr. Curtis).  Dental Consult called and appreciated to r/o potential source.  BC x 2 results pending. CTA head ordered to r/o mycotic aneurysm. TTE ordered.  CT of heart with cors ordered for pre op cardiac surgery work up. OR tentative for Friday 5/12.     5/12 OR for Mechanical AVR  ([place] 23mm) /w Dr. Chavez. No intra-op blood products.  Extubated in OR.  Coumadin 5mg  Post op Course:  5/13 Coumadin 5mg. Lopressor 25mg Q8 started  5/14 Zendejas Dc'd-- voiding Mediastinal chest tube x1 DC'd. Coumadin 5mg tonight for INR 1.42.  TX to floor.  No BM post op yet.  Pain well controlled on PCA.  Ambulated x 1 while in ICU  5/15 VSS, mediastinal chest tube output 100cc/24h - d/c today per Dr. Chavez, keep phuong drain. Pt ambulating without assist. PCA pump dc'd by anesthesia. INR, Coumadin.   5/16 VVS; Continue on current medication regimen.  PW D/C'd.  Phuong drain to be removed later this afternoon.  INR 2.05 --> Coumadin 2.5 mg PO tonight.  Nutrition consult for coumadin / diet education.    Plan for home PT in AM.

## 2023-05-18 NOTE — DISCHARGE NOTE PROVIDER - DETAILS OF MALNUTRITION DIAGNOSIS/DIAGNOSES
This patient has been assessed with a concern for Malnutrition and was treated during this hospitalization for the following Nutrition diagnosis/diagnoses:     -  05/16/2023: Severe protein-calorie malnutrition

## 2023-05-18 NOTE — DISCHARGE NOTE PROVIDER - NSDCMRMEDTOKEN_GEN_ALL_CORE_FT
acetaminophen 650 mg oral tablet, extended release: 2 tab(s) orally every 8 hours as needed for  chest pain  aspirin 81 mg oral delayed release tablet: 1 tab(s) orally once a day  metoprolol succinate 50 mg oral tablet, extended release: 1 tab(s) orally once a day  oxyCODONE 10 mg oral tablet: 1 tab(s) orally every 6 hours MDD: four tablets  pantoprazole 40 mg oral delayed release tablet: 1 tab(s) orally once a day (before a meal)  senna leaf extract oral tablet: 2 tab(s) orally once a day (at bedtime) as needed for  constipation  warfarin 3 mg oral tablet: 1 tab(s) orally once a day (at bedtime)   acetaminophen 650 mg oral tablet, extended release: 2 tab(s) orally every 8 hours as needed for  chest pain  aspirin 81 mg oral delayed release tablet: 1 tab(s) orally once a day  metoprolol succinate 50 mg oral tablet, extended release: 1 tab(s) orally once a day  pantoprazole 40 mg oral delayed release tablet: 1 tab(s) orally once a day (before a meal)  senna leaf extract oral tablet: 2 tab(s) orally once a day (at bedtime) as needed for  constipation  warfarin 3 mg oral tablet: 1 tab(s) orally once a day (at bedtime)

## 2023-05-18 NOTE — DISCHARGE NOTE PROVIDER - NSDCCPCAREPLAN_GEN_ALL_CORE_FT
PRINCIPAL DISCHARGE DIAGNOSIS  Diagnosis: Severe aortic regurgitation  Assessment and Plan of Treatment:

## 2023-05-18 NOTE — PROGRESS NOTE ADULT - REASON FOR ADMISSION
Endocarditis  s/p AVR
Endocarditis eval
Endocarditis evaluation
Endocarditis eval
Endocarditis, AVR
Endocarditis eval

## 2023-05-19 ENCOUNTER — APPOINTMENT (OUTPATIENT)
Dept: CARE COORDINATION | Facility: HOME HEALTH | Age: 38
End: 2023-05-19
Payer: MEDICAID

## 2023-05-19 VITALS — RESPIRATION RATE: 17 BRPM

## 2023-05-19 PROBLEM — E78.5 HYPERLIPIDEMIA, UNSPECIFIED: Chronic | Status: ACTIVE | Noted: 2023-05-09

## 2023-05-19 PROBLEM — E11.9 TYPE 2 DIABETES MELLITUS WITHOUT COMPLICATIONS: Chronic | Status: ACTIVE | Noted: 2023-05-09

## 2023-05-19 PROBLEM — I10 ESSENTIAL (PRIMARY) HYPERTENSION: Chronic | Status: ACTIVE | Noted: 2023-05-09

## 2023-05-19 PROCEDURE — 99024 POSTOP FOLLOW-UP VISIT: CPT

## 2023-05-19 RX ORDER — SENNOSIDES 8.6 MG/1
8.6 CAPSULE, GELATIN COATED ORAL
Qty: 28 | Refills: 0 | Status: ACTIVE | COMMUNITY

## 2023-05-19 RX ORDER — ACETAMINOPHEN 500 MG/1
TABLET ORAL EVERY 8 HOURS
Refills: 0 | Status: ACTIVE | COMMUNITY

## 2023-05-19 RX ORDER — WARFARIN 3 MG/1
3 TABLET ORAL DAILY
Refills: 0 | Status: ACTIVE | COMMUNITY

## 2023-05-19 RX ORDER — ASPIRIN 81 MG/1
81 TABLET ORAL DAILY
Refills: 0 | Status: ACTIVE | COMMUNITY

## 2023-05-19 NOTE — PHYSICAL EXAM
[] : no respiratory distress [Exaggerated Use Of Accessory Muscles For Inspiration] : no accessory muscle use [Chest Visual Inspection Thoracic Asymmetry] : no chest asymmetry [Oriented To Time, Place, And Person] : oriented to person, place, and time [FreeTextEntry1] : LBM 5/17

## 2023-05-19 NOTE — HISTORY OF PRESENT ILLNESS
[Home] : at home, [unfilled] , at the time of the visit. [Other Location: e.g. Home (Enter Location, City,State)___] : at [unfilled] [Spouse] : spouse [FreeTextEntry1] : 37M PMHx: DM, HTN, HLD, "heart murmur secondary to an infection in my blood\par (3/2023)," Now presents to [facility] from [facility], for Severe\par AI/endocarditis eval. Patient initially presented to [facility] ER for\par evaluation of cough and shortness of breath x 4 days. Patient states that over\par the course the last 4 days he has been experiencing shortness of breath as well\par as cough productive of white to yellow sputum, and states that there was a\par small blood streak in the sputum today. Patient states that he has been\par experiencing chest discomfort for the last 2 days as well, and points to the\par epigastric and left side of the chest when asked where the pain is localized.\par Patient states that the shortness of breath is worse when laying down, and\par states he has never had this before. PE ruled out at [facility] and Negative\par troponin Of note Patient states he has step bacteremia in [facility] in\par March 2023 and was send for on antibiotics which he completed in April 2023.\par Found to have severe AI, transferred to [facility] for cardiac surgery evaluation\par with Dr. Chavez.\par  \par Hospital Course:\par 5/10 ID consult called and appreciated for r/o encarditis (Dr. Curtis).  Dental\par Consult called and appreciated to r/o potential source.  BC x 2 results\par pending. CTA head ordered to r/o mycotic aneurysm. TTE ordered.  CT of heart\par with cors ordered for pre op cardiac surgery work up. OR tentative for Friday\par 5/12.\par  \par 5/12 OR for Mechanical AVR  ([place] 23mm) /w Dr. Chavez. No intra-op blood\par products.  Extubated in OR.  Coumadin 5mg\par Post op Course:\par 5/13 Coumadin 5mg. Lopressor 25mg Q8 started\par 5/14 Zendejas Dc'd-- voiding Mediastinal chest tube x1 DC'd. Coumadin 5mg tonight\par for INR 1.42.  TX to floor.  No BM post op yet.  Pain well controlled on PCA.\par Ambulated x 1 while in ICU\par 5/15 VSS, mediastinal chest tube output 100cc/24h - d/c today per Dr. Chavez,\par keep phuong drain. Pt ambulating without assist. PCA pump dc'd by anesthesia.\par INR, Coumadin.\par 5/16 VVS; Continue on current medication regimen.  PW D/C'd.  Phuong drain to be\par removed later this afternoon.  INR 2.05 --> Coumadin 2.5 mg PO tonight.\par Nutrition consult for coumadin / diet education.\par 5/17 VSS INR 1.95 Coumadin 5mg tonight ENT consulted evaluate throat  lesion\par seen on intubation- ECHO today\par Plan for home PT in AM.\par  \par 5/18 no findings on indirect laryngoscopy\par - no ENT intervention required. < from: TTE W or WO Ultrasound Enhancing Agent\par (05.17.23 @ 11:19) >\par  \par FINDINGS:\par Left Ventricle:\par Normal left ventricular cavity size. The left ventricular systolic function is\par normal with a calculated ejection fraction of 61 % by the Melchor's biplane\par method of disks.\par  \par Right Ventricle:\par Normal right ventricular cavity size, normal wall thickness and normal systolic\par function.\par  \par Aortic Valve:\par A mechanical valve replacement present in the aortic position. The aortic valve\par prosthesis is well seated with normal function. There is trace valvular\par regurgitation.\par  \par Mitral Valve:\par There is trace mitral regurgitation.\par  \par Tricuspid Valve:\par Structurally normal tricuspid valve with normal leaflet excursion.\par  \par Pulmonic Valve:\par Structurally normal pulmonic valve with normal leaflet excursion.\par  \par Pericardium:\par No pericardial effusion seen.\par  \par INR 1.66 will repeat this afternoon. INR check scheduled 5/22 with PCP\par 5/19 Initial visit completed via [facility]\par CC " I am doing well"

## 2023-05-23 ENCOUNTER — NON-APPOINTMENT (OUTPATIENT)
Age: 38
End: 2023-05-23

## 2023-05-24 PROBLEM — Z98.890 S/P LEFT ATRIAL APPENDAGE LIGATION: Status: ACTIVE | Noted: 2023-05-22

## 2023-05-24 PROBLEM — Z09 POSTOPERATIVE FOLLOW-UP: Status: ACTIVE | Noted: 2023-05-22

## 2023-05-24 PROBLEM — Z95.2 S/P AVR (AORTIC VALVE REPLACEMENT): Status: ACTIVE | Noted: 2023-05-22

## 2023-05-25 ENCOUNTER — APPOINTMENT (OUTPATIENT)
Dept: CARDIOTHORACIC SURGERY | Facility: CLINIC | Age: 38
End: 2023-05-25
Payer: MEDICAID

## 2023-05-25 ENCOUNTER — LABORATORY RESULT (OUTPATIENT)
Age: 38
End: 2023-05-25

## 2023-05-25 VITALS
WEIGHT: 170 LBS | HEART RATE: 92 BPM | SYSTOLIC BLOOD PRESSURE: 110 MMHG | RESPIRATION RATE: 16 BRPM | OXYGEN SATURATION: 98 % | DIASTOLIC BLOOD PRESSURE: 75 MMHG | HEIGHT: 66 IN | BODY MASS INDEX: 27.32 KG/M2 | TEMPERATURE: 97.7 F

## 2023-05-25 DIAGNOSIS — Z98.890 OTHER SPECIFIED POSTPROCEDURAL STATES: ICD-10-CM

## 2023-05-25 DIAGNOSIS — Z95.2 PRESENCE OF PROSTHETIC HEART VALVE: ICD-10-CM

## 2023-05-25 DIAGNOSIS — Z09 ENCOUNTER FOR FOLLOW-UP EXAMINATION AFTER COMPLETED TREATMENT FOR CONDITIONS OTHER THAN MALIGNANT NEOPLASM: ICD-10-CM

## 2023-05-25 PROCEDURE — 99024 POSTOP FOLLOW-UP VISIT: CPT

## 2023-05-25 RX ORDER — OXYCODONE AND ACETAMINOPHEN 5; 325 MG/1; MG/1
5-325 TABLET ORAL
Qty: 40 | Refills: 0 | Status: ACTIVE | COMMUNITY
Start: 1900-01-01 | End: 1900-01-01

## 2023-05-25 RX ORDER — WARFARIN 6 MG/1
6 TABLET ORAL DAILY
Qty: 15 | Refills: 0 | Status: ACTIVE | COMMUNITY
Start: 2023-05-25 | End: 1900-01-01

## 2023-05-25 RX ORDER — METOPROLOL SUCCINATE 50 MG/1
50 TABLET, EXTENDED RELEASE ORAL DAILY
Qty: 135 | Refills: 0 | Status: ACTIVE | COMMUNITY
Start: 1900-01-01 | End: 1900-01-01

## 2023-05-25 NOTE — END OF VISIT
[FreeTextEntry3] : \par I personally scribed for JACKFLAKO on May 25 2023 10:00AM . \par \par \par \par \par Physician Attestation:\par Documented by JOSE G FUNK acting as a scribe for JESSFLAKO FALCON 05/25/2023 . \par                 All medical record entries made by the Scribe were at my, FLAKO SANTIAGO , direction and personally dictated by me on 05/25/2023 . I have reviewed the chart and agree that the record accurately reflects my personal performance of the history, physical exam, assessment and plan\par \par

## 2023-05-25 NOTE — DISCUSSION/SUMMARY
[Doing Well] : is doing well [Fair Pain Control] : has fair pain control [No Sign of Infection] : is showing no signs of infection [Remove Sutures/Staples] : removed sutures/staples [7] : 7 [de-identified] : msi and chest [de-identified] : 2 to 2.5

## 2023-05-25 NOTE — ASSESSMENT
[FreeTextEntry1] : Mr. REED is a 37 year old male with  past medical history of DM, HTN, HLD, "heart murmur secondary to an infection in my blood (3/2023)," Now presents to [facility] from [facility], for Severe AI/endocarditis eval. Patient initially presented to [facility] for evaluation of cough and shortness of breath x 4 days. Patient states that over the course the last 4 days he has been experiencing shortness of breath as well as cough productive of white to yellow sputum, and states that there was a small blood streak in the sputum t. Patient states that he has been experiencing chest discomfort for the last 2 days as well, and points to the epigastric and left side of the chest when asked where the pain is localized. Patient states that the shortness of breath is worse when laying down, and states he has never had this before. PE ruled out at [facility] and Negative troponin Of note Patient states he has step bacteremia in [facility] in March 2023 and was send for on antibiotics which he completed in April 2023.  Found to have severe AI, transferred to [facility] for cardiac surgery evaluation with Dr. Chavez. \par \par \par He is S/P Aortic valve replacement utilizing a 23-mm On- X mechanical prosthetic valve,  Exclusion of left atrial appendage with an AtriClip on 5/12/23. On Coumadin, INR managed by PCP , INR scheduled on 5/22/23 with PCP.  ENT consulted evaluate throat  lesion seen on intubation, 5/18 no findings on indirect laryngoscopy  no ENT intervention required. Discharge to Home. He is here for post op visit. \par \par Today he presents and reports that he has shortness of breath today with walking to the office from bus stop and fatigue.Denies any chest pain, palpitations, dizziness or pedal edema. \par \par \par Today on exam patient's lungs clear bilaterally, normal sinus rhythm, sternum stable, incision clean, dry and intact.  No peripheral edema noted. Staples removed today.\par \par  Instructed patient on importance of optimal glycemic control, daily showering, daily weights, any signs of fever (temperature greater than 101F, chills,  incentive spirometer use, and increase ambulation as tolerated. Instructed to call office with any signs or symptoms of infection or weight gain of 2 or more pounds in 1 day or 3 or more pounds in 1 week.  \par \par Discussed intake of plant based foods, including vegetables, fruits, and whole grain foods: legumes, nuts and seeds, fish or seafood, lean meats, and non-fat or low-fat diary foods. Plant based oils (non-tropical) in place of solid fats. Instructed patient to limit intake of high fat meats and processed meats, high-fat diary foods, dietary cholesterol and sodium, foods and beverages with added sugars. \par \par Plan:\par 1) Continue current medication regimen\par 2) Follow up with cardiologist ( Dr. Barrios ) and PCP \par 3) Follow up on June 5 2023 \par 4) Follow up PT/INR for Coumadin dosing adjustment ( )\par 5) SBE antibiotic prophylaxis discussed at length \par 6) Continue to increase activity and walk daily as tolerated. Continue to use incentive spirometer. \par 7) Keep legs elevated above heart when resting/sitting/sleeping. \par 8) Call MD if you experience fever, fatigue, dizziness, confusion, syncope, shortness of breath, chest pain not relieved with analgesics, increased redness/drainage from the surgical  incision site\par 9) Virtual Cardiac Rehab referral\par 10) Increase Toprol XL to 75 mg daily \par 11) Labs: INR \par \par \par

## 2023-05-25 NOTE — CONSULT LETTER
[Courtesy Letter:] : I had the pleasure of seeing your patient, [unfilled], in my office today. [Please see my note below.] : Please see my note below. [Consult Closing:] : Thank you very much for allowing me to participate in the care of this patient.  If you have any questions, please do not hesitate to contact me. [Sincerely,] : Sincerely, [FreeTextEntry2] :  [FreeTextEntry3] : Kindra Chavez MD\par Attending Surgeon \par [facility]\par  \par Cardiovascular & Thoracic Surgery\par [employer] [facility] [employer]\par \par

## 2023-05-26 ENCOUNTER — NON-APPOINTMENT (OUTPATIENT)
Age: 38
End: 2023-05-26

## 2023-05-29 ENCOUNTER — INPATIENT (INPATIENT)
Facility: HOSPITAL | Age: 38
LOS: 1 days | Discharge: HOME | DRG: 948 | End: 2023-05-31
Attending: THORACIC SURGERY (CARDIOTHORACIC VASCULAR SURGERY) | Admitting: TRANSPLANT SURGERY
Payer: MEDICAID

## 2023-05-29 ENCOUNTER — TRANSCRIPTION ENCOUNTER (OUTPATIENT)
Age: 38
End: 2023-05-29

## 2023-05-29 VITALS
SYSTOLIC BLOOD PRESSURE: 118 MMHG | HEART RATE: 81 BPM | HEIGHT: 66 IN | DIASTOLIC BLOOD PRESSURE: 80 MMHG | RESPIRATION RATE: 18 BRPM | TEMPERATURE: 98 F | OXYGEN SATURATION: 99 % | WEIGHT: 169.98 LBS

## 2023-05-29 DIAGNOSIS — R07.9 CHEST PAIN, UNSPECIFIED: ICD-10-CM

## 2023-05-29 DIAGNOSIS — Z95.2 PRESENCE OF PROSTHETIC HEART VALVE: ICD-10-CM

## 2023-05-29 DIAGNOSIS — L76.82 OTHER POSTPROCEDURAL COMPLICATIONS OF SKIN AND SUBCUTANEOUS TISSUE: ICD-10-CM

## 2023-05-29 LAB
ALBUMIN SERPL ELPH-MCNC: 4.4 G/DL — SIGNIFICANT CHANGE UP (ref 3.3–5)
ALP SERPL-CCNC: 90 U/L — SIGNIFICANT CHANGE UP (ref 40–120)
ALT FLD-CCNC: 72 U/L — HIGH (ref 10–45)
ANION GAP SERPL CALC-SCNC: 13 MMOL/L — SIGNIFICANT CHANGE UP (ref 5–17)
APTT BLD: 40.1 SEC — HIGH (ref 27.5–35.5)
AST SERPL-CCNC: 52 U/L — HIGH (ref 10–40)
BASE EXCESS BLDV CALC-SCNC: -0.4 MMOL/L — SIGNIFICANT CHANGE UP (ref -2–3)
BASOPHILS # BLD AUTO: 0.06 K/UL — SIGNIFICANT CHANGE UP (ref 0–0.2)
BASOPHILS NFR BLD AUTO: 1 % — SIGNIFICANT CHANGE UP (ref 0–2)
BILIRUB SERPL-MCNC: 0.1 MG/DL — LOW (ref 0.2–1.2)
BUN SERPL-MCNC: 18 MG/DL — SIGNIFICANT CHANGE UP (ref 7–23)
CA-I SERPL-SCNC: 1.26 MMOL/L — SIGNIFICANT CHANGE UP (ref 1.15–1.33)
CALCIUM SERPL-MCNC: 9.3 MG/DL — SIGNIFICANT CHANGE UP (ref 8.4–10.5)
CHLORIDE BLDV-SCNC: 107 MMOL/L — SIGNIFICANT CHANGE UP (ref 96–108)
CHLORIDE SERPL-SCNC: 104 MMOL/L — SIGNIFICANT CHANGE UP (ref 96–108)
CK MB CFR SERPL CALC: 1.6 NG/ML — SIGNIFICANT CHANGE UP (ref 0–6.7)
CK SERPL-CCNC: 60 U/L — SIGNIFICANT CHANGE UP (ref 30–200)
CO2 BLDV-SCNC: 26 MMOL/L — SIGNIFICANT CHANGE UP (ref 22–26)
CO2 SERPL-SCNC: 23 MMOL/L — SIGNIFICANT CHANGE UP (ref 22–31)
CREAT SERPL-MCNC: 0.89 MG/DL — SIGNIFICANT CHANGE UP (ref 0.5–1.3)
CRP SERPL-MCNC: 9 MG/L — HIGH (ref 0–4)
EGFR: 112 ML/MIN/1.73M2 — SIGNIFICANT CHANGE UP
EOSINOPHIL # BLD AUTO: 0.13 K/UL — SIGNIFICANT CHANGE UP (ref 0–0.5)
EOSINOPHIL NFR BLD AUTO: 2.3 % — SIGNIFICANT CHANGE UP (ref 0–6)
ERYTHROCYTE [SEDIMENTATION RATE] IN BLOOD: 88 MM/HR — HIGH (ref 0–15)
GAS PNL BLDV: 139 MMOL/L — SIGNIFICANT CHANGE UP (ref 136–145)
GAS PNL BLDV: SIGNIFICANT CHANGE UP
GLUCOSE BLDV-MCNC: 117 MG/DL — HIGH (ref 70–99)
GLUCOSE SERPL-MCNC: 100 MG/DL — HIGH (ref 70–99)
HCO3 BLDV-SCNC: 25 MMOL/L — SIGNIFICANT CHANGE UP (ref 22–29)
HCT VFR BLD CALC: 33 % — LOW (ref 39–50)
HCT VFR BLDA CALC: 22 % — LOW (ref 39–51)
HGB BLD CALC-MCNC: 7.2 G/DL — LOW (ref 12.6–17.4)
HGB BLD-MCNC: 10.3 G/DL — LOW (ref 13–17)
IMM GRANULOCYTES NFR BLD AUTO: 0.2 % — SIGNIFICANT CHANGE UP (ref 0–0.9)
INR BLD: 3.61 RATIO — HIGH (ref 0.88–1.16)
LACTATE BLDV-MCNC: 1.7 MMOL/L — SIGNIFICANT CHANGE UP (ref 0.5–2)
LIDOCAIN IGE QN: 51 U/L — SIGNIFICANT CHANGE UP (ref 7–60)
LYMPHOCYTES # BLD AUTO: 2.62 K/UL — SIGNIFICANT CHANGE UP (ref 1–3.3)
LYMPHOCYTES # BLD AUTO: 45.6 % — HIGH (ref 13–44)
MAGNESIUM SERPL-MCNC: 1.9 MG/DL — SIGNIFICANT CHANGE UP (ref 1.6–2.6)
MCHC RBC-ENTMCNC: 27.8 PG — SIGNIFICANT CHANGE UP (ref 27–34)
MCHC RBC-ENTMCNC: 31.2 GM/DL — LOW (ref 32–36)
MCV RBC AUTO: 89.2 FL — SIGNIFICANT CHANGE UP (ref 80–100)
MONOCYTES # BLD AUTO: 0.51 K/UL — SIGNIFICANT CHANGE UP (ref 0–0.9)
MONOCYTES NFR BLD AUTO: 8.9 % — SIGNIFICANT CHANGE UP (ref 2–14)
NEUTROPHILS # BLD AUTO: 2.41 K/UL — SIGNIFICANT CHANGE UP (ref 1.8–7.4)
NEUTROPHILS NFR BLD AUTO: 42 % — LOW (ref 43–77)
NRBC # BLD: 0 /100 WBCS — SIGNIFICANT CHANGE UP (ref 0–0)
NT-PROBNP SERPL-SCNC: 323 PG/ML — HIGH (ref 0–300)
PCO2 BLDV: 43 MMHG — SIGNIFICANT CHANGE UP (ref 42–55)
PH BLDV: 7.37 — SIGNIFICANT CHANGE UP (ref 7.32–7.43)
PHOSPHATE SERPL-MCNC: 3.5 MG/DL — SIGNIFICANT CHANGE UP (ref 2.5–4.5)
PLATELET # BLD AUTO: 522 K/UL — HIGH (ref 150–400)
PO2 BLDV: 67 MMHG — HIGH (ref 25–45)
POTASSIUM BLDV-SCNC: 4.1 MMOL/L — SIGNIFICANT CHANGE UP (ref 3.5–5.1)
POTASSIUM SERPL-MCNC: 4.1 MMOL/L — SIGNIFICANT CHANGE UP (ref 3.5–5.3)
POTASSIUM SERPL-SCNC: 4.1 MMOL/L — SIGNIFICANT CHANGE UP (ref 3.5–5.3)
PROT SERPL-MCNC: 8.4 G/DL — HIGH (ref 6–8.3)
PROTHROM AB SERPL-ACNC: 42 SEC — HIGH (ref 10.5–13.4)
RBC # BLD: 3.7 M/UL — LOW (ref 4.2–5.8)
RBC # FLD: 15.2 % — HIGH (ref 10.3–14.5)
SAO2 % BLDV: 96.5 % — HIGH (ref 67–88)
SODIUM SERPL-SCNC: 140 MMOL/L — SIGNIFICANT CHANGE UP (ref 135–145)
TROPONIN T, HIGH SENSITIVITY RESULT: 52 NG/L — HIGH (ref 0–51)
TROPONIN T, HIGH SENSITIVITY RESULT: 56 NG/L — HIGH (ref 0–51)
WBC # BLD: 5.74 K/UL — SIGNIFICANT CHANGE UP (ref 3.8–10.5)
WBC # FLD AUTO: 5.74 K/UL — SIGNIFICANT CHANGE UP (ref 3.8–10.5)

## 2023-05-29 PROCEDURE — 93306 TTE W/DOPPLER COMPLETE: CPT | Mod: 26

## 2023-05-29 PROCEDURE — 93356 MYOCRD STRAIN IMG SPCKL TRCK: CPT

## 2023-05-29 PROCEDURE — 76376 3D RENDER W/INTRP POSTPROCES: CPT | Mod: 26

## 2023-05-29 PROCEDURE — 99232 SBSQ HOSP IP/OBS MODERATE 35: CPT | Mod: 24

## 2023-05-29 PROCEDURE — 93308 TTE F-UP OR LMTD: CPT | Mod: 26

## 2023-05-29 PROCEDURE — 71045 X-RAY EXAM CHEST 1 VIEW: CPT | Mod: 26

## 2023-05-29 PROCEDURE — 99285 EMERGENCY DEPT VISIT HI MDM: CPT

## 2023-05-29 RX ORDER — WARFARIN SODIUM 2.5 MG/1
1 TABLET ORAL ONCE
Refills: 0 | Status: COMPLETED | OUTPATIENT
Start: 2023-05-29 | End: 2023-05-29

## 2023-05-29 RX ORDER — INSULIN LISPRO 100/ML
VIAL (ML) SUBCUTANEOUS
Refills: 0 | Status: DISCONTINUED | OUTPATIENT
Start: 2023-05-29 | End: 2023-05-29

## 2023-05-29 RX ORDER — METOPROLOL TARTRATE 50 MG
50 TABLET ORAL DAILY
Refills: 0 | Status: DISCONTINUED | OUTPATIENT
Start: 2023-05-29 | End: 2023-05-31

## 2023-05-29 RX ORDER — WARFARIN SODIUM 2.5 MG/1
2 TABLET ORAL ONCE
Refills: 0 | Status: DISCONTINUED | OUTPATIENT
Start: 2023-05-29 | End: 2023-05-29

## 2023-05-29 RX ORDER — PANTOPRAZOLE SODIUM 20 MG/1
40 TABLET, DELAYED RELEASE ORAL
Refills: 0 | Status: DISCONTINUED | OUTPATIENT
Start: 2023-05-29 | End: 2023-05-31

## 2023-05-29 RX ORDER — ACETAMINOPHEN 500 MG
650 TABLET ORAL ONCE
Refills: 0 | Status: DISCONTINUED | OUTPATIENT
Start: 2023-05-29 | End: 2023-05-29

## 2023-05-29 RX ORDER — ASPIRIN/CALCIUM CARB/MAGNESIUM 324 MG
81 TABLET ORAL DAILY
Refills: 0 | Status: DISCONTINUED | OUTPATIENT
Start: 2023-05-29 | End: 2023-05-31

## 2023-05-29 RX ORDER — INSULIN LISPRO 100/ML
VIAL (ML) SUBCUTANEOUS AT BEDTIME
Refills: 0 | Status: DISCONTINUED | OUTPATIENT
Start: 2023-05-29 | End: 2023-05-29

## 2023-05-29 RX ADMIN — WARFARIN SODIUM 1 MILLIGRAM(S): 2.5 TABLET ORAL at 21:31

## 2023-05-29 RX ADMIN — Medication 50 MILLIGRAM(S): at 18:30

## 2023-05-29 NOTE — ED ADULT NURSE NOTE - NSFALLUNIVINTERV_ED_ALL_ED
Bed/Stretcher in lowest position, wheels locked, appropriate side rails in place/Call bell, personal items and telephone in reach/Instruct patient to call for assistance before getting out of bed/chair/stretcher/Non-slip footwear applied when patient is off stretcher/[place] to call system/Physically safe environment - no spills, clutter or unnecessary equipment/Purposeful proactive rounding/Room/bathroom lighting operational, light cord in reach

## 2023-05-29 NOTE — H&P ADULT - NSHPPHYSICALEXAM_GEN_ALL_CORE
General: WN/WD NAD  Neurology: A&Ox3, nonfocal, ISRAEL x 4  Head:  Normocephalic, atraumatic  ENT:  Mucosa moist, no ulcerations  Neck:  Supple, no sinuses or palpable masses  Lymphatic:  No palpable cervical, supraclavicular, axillary or inguinal adenopathy  Respiratory: CTA B/L  CV: RRR, S1S2, no murmur  Abdominal: Soft, NT, ND no palpable mass  MSK: No edema, + peripheral pulses, FROM all 4 extremity  Incisions: intact, no erythema or drainage  + tenderness to midsternal incision

## 2023-05-29 NOTE — PATIENT PROFILE ADULT - FALL HARM RISK - HARM RISK INTERVENTIONS

## 2023-05-29 NOTE — ED PROVIDER NOTE - CLINICAL SUMMARY MEDICAL DECISION MAKING FREE TEXT BOX
36 yo m with hx of DM, HTN, HLD, hx of Severe AI/endocarditis s/p AVR on 5/12 presenting with chest pain. Differential diagnosis includes but is not limited to ACS, PE, pleuritis, pericarditis. would get labs, ecg, cxr, and consult ct surgery. dispo pending workup. 38 yo m with hx of DM, HTN, HLD, hx of Severe AI/endocarditis s/p AVR on 5/12 presenting with chest pain. Differential diagnosis includes but is not limited to ACS, PE, pleuritis, pericarditis. would get labs, ecg, cxr, and consult ct surgery. dispo pending workup.  Attending Kiera Womack: 37-year-old male with complex past medical history including history of endocarditis with severe aortic insufficiency status post aortic valve replacement in mid May presenting with concern for chest pain.  Upon arrival patient hemodynamically stable.  Patient does endorse some pleuritic component of chest pain as well as pain with inspiration.  Point-of-care ultrasound performed showing no evidence of large pericardial effusion.  Patient is tender to palpation in his chest wall.  Concern for possible pericarditis versus pleuritis versus irritation secondary to surgical procedure.  Patient is on Coumadin and will check INR if therapeutic less likely pulmonary embolism.  Will discuss with CT surgery as patiently recent postop, check labs, check inflammatory markers and reeval. n ofevers or cough making PNA less likely.

## 2023-05-29 NOTE — ED PROVIDER NOTE - OBJECTIVE STATEMENT
36 yo m with hx of DM, HTN, HLD, hx of Severe AI/endocarditis s/p AVR on 5/12 presenting with chest pain. No fever, chills, nausea, vomiting, diarrhea. He has some associated dyspnea. He denies falls or trauma. No LE pain or swelling. He took tylenol and percocet w/o relief.

## 2023-05-29 NOTE — H&P ADULT - PROBLEM SELECTOR PLAN 2
c/w anticoagulation for mechanical louis goal 2-2.5   c/w bblocker, asa  PPI,  incentive spirometry, ambulation  telemetry, strict I/O  wound care and sternal precautions

## 2023-05-29 NOTE — ED PROVIDER NOTE - PHYSICAL EXAMINATION
General: well -appearing, no acute distress  Head: Atraumatic, normocephalic  Eyes: EOM grossly in tact, no scleral icterus, no discharge  Neurology: A&Ox 3, nonfocal, ISRAEL x 4  Chest: healed sternotomy scar  Respiratory: CTAB, no wheezing, normal respiratory effort  CV: RRR, good s1/s2, no S3, Extremities warm and well perfused  Abdominal: Soft, non-distended, non-tender, no masses  Extremities: No edema, no deformities  Skin: warm and dry. No rashes General: well -appearing, no acute distress  Head: Atraumatic, normocephalic  Eyes: EOM grossly in tact, no scleral icterus, no discharge  Neurology: A&Ox 3, nonfocal, ISRAEL x 4  Chest: healed sternotomy scar  Respiratory: CTAB, no wheezing, normal respiratory effort  CV: RRR, good s1/s2, no S3, Extremities warm and well perfused  Abdominal: Soft, non-distended, non-tender, no masses  Extremities: No edema, no deformities  Skin: warm and dry. No rashes  Attending Kiera Womack: Gen: NAD, heent: atrauamtic, eomi, , mmm, op pink, uvula midline, neck; nttp, no nuchal rigidity, chest: ttp sternal area, no eruytyema surgical scar no crepitus, cv: rrr, +murmur, lungs: ctab, abd: soft, nontender, nondistended, no peritoneal signs, no guarding, ext: wwp, neg homans, skin: no rash, neuro: awake and alert, following commands, speech clear, sensation and strength intact, no focal deficits

## 2023-05-29 NOTE — ED CLERICAL - NS ED CLERK NOTE PRE-ARRIVAL INFORMATION; ADDITIONAL PRE-ARRIVAL INFORMATION
This patient is enrolled in the Follow Your Heart program and has undergone a cardiac surgery procedure within the last 30 days and has active care navigation.   This patient can be followed up by the care navigation team within 24 hours. To arrange close follow-up or to obtain additional clinical information about this patient, please call the contact number above.   Please call the cardiac surgery team once patient is registered at [phone] for consultation PRIOR to disposition decision.  The patient recently underwent a cardiac surgery procedure and the team can assist in acute medical management.

## 2023-05-29 NOTE — ED PROVIDER NOTE - ATTENDING CONTRIBUTION TO CARE
Attending MD Kiera Womack:  I personally have seen and examined this patient.  Resident note reviewed and agree on plan of care and except where noted.  See HPI, PE, and MDM for details.

## 2023-05-29 NOTE — H&P ADULT - ASSESSMENT
37M PMHx: DM, HTN, HLD s/p  5/12 OR for Mechanical AVR  (Scotty 23mm) /w Dr. Chavez for severe AI secondary strep bacteremia. Pt discharged 5/18 and presents to ED c/o reproducible pain midsternal incision.

## 2023-05-29 NOTE — H&P ADULT - HISTORY OF PRESENT ILLNESS
37M PMHx: DM, HTN, HLD s/p  5/12 OR for Mechanical AVR  (Scotty 23mm) /w Dr. Chavez for severe AI h/o strep bacteremia. Pt discharged 5/18 and presents to ED c/o reproducible pain midsternal incision. Reports relief of pain with percocet. CE negative, CXR clear. INR therapeutic.

## 2023-05-29 NOTE — H&P ADULT - PROBLEM SELECTOR PLAN 1
CE negative, CXR clear.  C/w analgesics for adequate pain control  Consider Toradol as an analgesic adjuvant     Sternal precautions

## 2023-05-29 NOTE — PROVIDER CONTACT NOTE (CHANGE IN STATUS NOTIFICATION) - ASSESSMENT
Pt a&Ox4. Pt c/o midsternal chest pain 10/10. non radiating.  b/p=97/66, HR=86, R=18, H3=587% RA, T=98.3

## 2023-05-29 NOTE — H&P ADULT - NSHPLABSRESULTS_GEN_ALL_CORE
< end of copied text >    < from: TTE W or WO Ultrasound Enhancing Agent (05.29.23 @ 10:37) >    Aortic Valve:  A tilting disk mechanical valve replacement present inthe aortic position. The aortic valve prosthesis is well seated with normal function. The aortic root is thickened. The aortic valve is tricuspid with normal structure without stenosis. The peak transaortic velocity is 2.27 m/s, peak transaortic gradient is 20.6 mmHg and mean transaortic gradient is 10.6 mmHg with an LVOT/aortic valve VTI ratio of 0.51. The aortic valve area is estimated at 1.44 cm² by the continuity equation. There is no evidence of aortic regurgitation.     Mitral Valve:  Structurally normal mitral valve with normal leaflet excursion. There is no evidence of mitral regurgitation.     Tricuspid Valve:  Structurally normal tricuspid valve with normal leaflet excursion. There is trace tricuspid regurgitation.     Pulmonic Valve:  Structurally normal pulmonic valve with normal leaflet excursion. There is no evidence of pulmonic regurgitation.     Aorta:  The aortic annulus and aortic root appear normal in size.     Systemic Veins:  The inferior vena cava is normal measuring1.80 cm in diameter, (normal <2.1cm) with normal inspiratory collapse (normal >50%) consistent with normal right atrial pressure (~3, range 0-5mmHg).  ____________________________________________________________________    < end of copied text >

## 2023-05-29 NOTE — ED ADULT TRIAGE NOTE - CHIEF COMPLAINT QUOTE
BIBA hx recent aortic valve replacement, DC on 5/18.   C/o chest pain x yesterday a/w shortness of breath. Describes as nonradiating midsternal. denies nv.

## 2023-05-30 LAB
ANION GAP SERPL CALC-SCNC: 14 MMOL/L — SIGNIFICANT CHANGE UP (ref 5–17)
BUN SERPL-MCNC: 15 MG/DL — SIGNIFICANT CHANGE UP (ref 7–23)
CALCIUM SERPL-MCNC: 9.4 MG/DL — SIGNIFICANT CHANGE UP (ref 8.4–10.5)
CHLORIDE SERPL-SCNC: 101 MMOL/L — SIGNIFICANT CHANGE UP (ref 96–108)
CO2 SERPL-SCNC: 23 MMOL/L — SIGNIFICANT CHANGE UP (ref 22–31)
CREAT SERPL-MCNC: 0.9 MG/DL — SIGNIFICANT CHANGE UP (ref 0.5–1.3)
EGFR: 112 ML/MIN/1.73M2 — SIGNIFICANT CHANGE UP
GLUCOSE SERPL-MCNC: 89 MG/DL — SIGNIFICANT CHANGE UP (ref 70–99)
HCT VFR BLD CALC: 30.5 % — LOW (ref 39–50)
HGB BLD-MCNC: 9.6 G/DL — LOW (ref 13–17)
INR BLD: 3.3 RATIO — HIGH (ref 0.88–1.16)
MCHC RBC-ENTMCNC: 27.8 PG — SIGNIFICANT CHANGE UP (ref 27–34)
MCHC RBC-ENTMCNC: 31.5 GM/DL — LOW (ref 32–36)
MCV RBC AUTO: 88.4 FL — SIGNIFICANT CHANGE UP (ref 80–100)
NRBC # BLD: 0 /100 WBCS — SIGNIFICANT CHANGE UP (ref 0–0)
PLATELET # BLD AUTO: 440 K/UL — HIGH (ref 150–400)
POTASSIUM SERPL-MCNC: 4.1 MMOL/L — SIGNIFICANT CHANGE UP (ref 3.5–5.3)
POTASSIUM SERPL-SCNC: 4.1 MMOL/L — SIGNIFICANT CHANGE UP (ref 3.5–5.3)
PROTHROM AB SERPL-ACNC: 38.8 SEC — HIGH (ref 10.5–13.4)
RBC # BLD: 3.45 M/UL — LOW (ref 4.2–5.8)
RBC # FLD: 15 % — HIGH (ref 10.3–14.5)
SODIUM SERPL-SCNC: 138 MMOL/L — SIGNIFICANT CHANGE UP (ref 135–145)
WBC # BLD: 5.64 K/UL — SIGNIFICANT CHANGE UP (ref 3.8–10.5)
WBC # FLD AUTO: 5.64 K/UL — SIGNIFICANT CHANGE UP (ref 3.8–10.5)

## 2023-05-30 PROCEDURE — 99232 SBSQ HOSP IP/OBS MODERATE 35: CPT | Mod: 24

## 2023-05-30 RX ORDER — SENNA PLUS 8.6 MG/1
2 TABLET ORAL AT BEDTIME
Refills: 0 | Status: DISCONTINUED | OUTPATIENT
Start: 2023-05-30 | End: 2023-05-31

## 2023-05-30 RX ORDER — WARFARIN SODIUM 2.5 MG/1
1 TABLET ORAL ONCE
Refills: 0 | Status: COMPLETED | OUTPATIENT
Start: 2023-05-30 | End: 2023-05-30

## 2023-05-30 RX ADMIN — SENNA PLUS 2 TABLET(S): 8.6 TABLET ORAL at 05:46

## 2023-05-30 RX ADMIN — SENNA PLUS 2 TABLET(S): 8.6 TABLET ORAL at 21:42

## 2023-05-30 RX ADMIN — Medication 81 MILLIGRAM(S): at 11:21

## 2023-05-30 RX ADMIN — Medication 50 MILLIGRAM(S): at 05:46

## 2023-05-30 RX ADMIN — WARFARIN SODIUM 1 MILLIGRAM(S): 2.5 TABLET ORAL at 21:42

## 2023-05-30 RX ADMIN — PANTOPRAZOLE SODIUM 40 MILLIGRAM(S): 20 TABLET, DELAYED RELEASE ORAL at 05:45

## 2023-05-30 NOTE — CHART NOTE - NSCHARTNOTEFT_GEN_A_CORE
Nutrition  Chart note  Verbal Request for coumadin education    Diet, Consistent Carbohydrate Renal/No Snacks:   DASH/TLC {Sodium & Cholesterol Restricted} (DASH) (23 @ 08:26) [Active]    Weights:   Daily Weight in k.9 ()    MEDICATIONS  (STANDING):  metoprolol succinate ER  pantoprazole    Tablet  senna    Warfarin PTA    Pertinent Labs:  @ 06:29: Na 138, BUN 15, Cr 0.90, BG 89, K+ 4.1, Phos --, Mg --, Alk Phos --, ALT/SGPT --, AST/SGOT --, HbA1c --    A1C with Estimated Average Glucose Result: 5.3 % (05-10-23 @ 01:13) - indicates WNL    Spoke with patient and family at bedside, provided Vitamin K and coumadin/warfarin interaction. Discussed foods containing vitamin K, to eat consistently and in moderation. Provided pt with packet: Vitamin K and medications. All questions answered.     Pt and family made aware that RD remains available.     Julieta Cohen, MS, RD, CDN #[phone]

## 2023-05-30 NOTE — PROGRESS NOTE ADULT - ASSESSMENT
37M PMHx: DM, HTN, HLD s/p  5/12 OR for Mechanical AVR  (Scotty 23mm) /w Dr. Chavez for severe AI secondary strep bacteremia. Pt discharged 5/18 and presents to ED c/o reproducible pain midsternal incision.      5/30 Patient cleared for discharge. Refusing to go home because of INR. 24 hour notice given. Plan for discharge home tomorrow.

## 2023-05-30 NOTE — PROGRESS NOTE ADULT - SUBJECTIVE AND OBJECTIVE BOX
VITAL SIGNS    Telemetry:  SR 70-80  Vital Signs Last 24 Hrs  T(C): 36.6 (23 @ 04:55), Max: 36.9 (23 @ 12:49)  T(F): 97.8 (23 @ 04:55), Max: 98.5 (23 @ 12:49)  HR: 72 (23 @ 04:55) (72 - 85)  BP: 104/69 (23 @ 04:55) (103/70 - 120/83)  RR: 18 (23 @ 04:55) (17 - 18)  SpO2: 99% (23 @ 04:55) (99% - 100%)             @ 07:01  -   @ 07:00  --------------------------------------------------------  IN: 0 mL / OUT: 750 mL / NET: -750 mL       Daily     Daily Weight in k.9 (30 May 2023 07:23)  Admit Wt: Drug Dosing Weight  Height (cm): 167.6 (29 May 2023 02:04)  Weight (kg): 77.1 (29 May 2023 02:04)  BMI (kg/m2): 27.4 (29 May 2023 02:04)  BSA (m2): 1.87 (29 May 2023 02:04)      CAPILLARY BLOOD GLUCOSE              MEDICATIONS  aspirin enteric coated 81 milliGRAM(s) Oral daily  metoprolol succinate ER 50 milliGRAM(s) Oral daily  oxycodone    5 mG/acetaminophen 325 mG 2 Tablet(s) Oral every 3 hours PRN  oxycodone    5 mG/acetaminophen 325 mG 1 Tablet(s) Oral every 3 hours PRN  pantoprazole    Tablet 40 milliGRAM(s) Oral before breakfast  senna 2 Tablet(s) Oral at bedtime  warfarin 1 milliGRAM(s) Oral once      >>> <<<  PHYSICAL EXAM  Subjective: " Hi, I feel fine"  Neurology: alert and oriented x 3, nonfocal, no gross deficits  CV : RRR S1S2, no murmurs, rubs or gallops   Sternal Wound :  CDI , Stable  Lungs: CTA B/L, No wheezing, rales, rhonchi. Non labored respirations   Abdomen: soft, NT,ND, ( )BM  :  voiding  Extremities:  No LE edema bilaterally, +DP, No calf tenderness     LABS      138  |  101  |  15  ----------------------------<  89  4.1   |  23  |  0.90    Ca    9.4      30 May 2023 06:29  Phos  3.5       Mg     1.9         TPro  8.4<H>  /  Alb  4.4  /  TBili  0.1<L>  /  DBili  x   /  AST  52<H>  /  ALT  72<H>  /  AlkPhos  90                                   9.6    5.64  )-----------( 440      ( 30 May 2023 06:31 )             30.5          PT/INR - ( 30 May 2023 06:31 )   PT: 38.8 sec;   INR: 3.30 ratio         PTT - ( 29 May 2023 04:18 )  PTT:40.1 sec       PAST MEDICAL & SURGICAL HISTORY:  HTN (hypertension)      HLD (hyperlipidemia)      DM (diabetes mellitus)

## 2023-05-31 ENCOUNTER — TRANSCRIPTION ENCOUNTER (OUTPATIENT)
Age: 38
End: 2023-05-31

## 2023-05-31 VITALS
DIASTOLIC BLOOD PRESSURE: 68 MMHG | SYSTOLIC BLOOD PRESSURE: 103 MMHG | OXYGEN SATURATION: 100 % | HEART RATE: 83 BPM | RESPIRATION RATE: 18 BRPM | TEMPERATURE: 99 F

## 2023-05-31 LAB
ANION GAP SERPL CALC-SCNC: 12 MMOL/L — SIGNIFICANT CHANGE UP (ref 5–17)
BUN SERPL-MCNC: 16 MG/DL — SIGNIFICANT CHANGE UP (ref 7–23)
CALCIUM SERPL-MCNC: 9.6 MG/DL — SIGNIFICANT CHANGE UP (ref 8.4–10.5)
CHLORIDE SERPL-SCNC: 100 MMOL/L — SIGNIFICANT CHANGE UP (ref 96–108)
CO2 SERPL-SCNC: 24 MMOL/L — SIGNIFICANT CHANGE UP (ref 22–31)
CREAT SERPL-MCNC: 1.03 MG/DL — SIGNIFICANT CHANGE UP (ref 0.5–1.3)
EGFR: 95 ML/MIN/1.73M2 — SIGNIFICANT CHANGE UP
GLUCOSE SERPL-MCNC: 95 MG/DL — SIGNIFICANT CHANGE UP (ref 70–99)
HCT VFR BLD CALC: 29.8 % — LOW (ref 39–50)
HGB BLD-MCNC: 9.4 G/DL — LOW (ref 13–17)
INR BLD: 2.28 RATIO — HIGH (ref 0.88–1.16)
MCHC RBC-ENTMCNC: 27.5 PG — SIGNIFICANT CHANGE UP (ref 27–34)
MCHC RBC-ENTMCNC: 31.5 GM/DL — LOW (ref 32–36)
MCV RBC AUTO: 87.1 FL — SIGNIFICANT CHANGE UP (ref 80–100)
NRBC # BLD: 0 /100 WBCS — SIGNIFICANT CHANGE UP (ref 0–0)
PLATELET # BLD AUTO: 427 K/UL — HIGH (ref 150–400)
POTASSIUM SERPL-MCNC: 4.1 MMOL/L — SIGNIFICANT CHANGE UP (ref 3.5–5.3)
POTASSIUM SERPL-SCNC: 4.1 MMOL/L — SIGNIFICANT CHANGE UP (ref 3.5–5.3)
PROTHROM AB SERPL-ACNC: 26.4 SEC — HIGH (ref 10.5–13.4)
RBC # BLD: 3.42 M/UL — LOW (ref 4.2–5.8)
RBC # FLD: 14.9 % — HIGH (ref 10.3–14.5)
SODIUM SERPL-SCNC: 136 MMOL/L — SIGNIFICANT CHANGE UP (ref 135–145)
WBC # BLD: 5.46 K/UL — SIGNIFICANT CHANGE UP (ref 3.8–10.5)
WBC # FLD AUTO: 5.46 K/UL — SIGNIFICANT CHANGE UP (ref 3.8–10.5)

## 2023-05-31 PROCEDURE — 82435 ASSAY OF BLOOD CHLORIDE: CPT

## 2023-05-31 PROCEDURE — 82550 ASSAY OF CK (CPK): CPT

## 2023-05-31 PROCEDURE — 85025 COMPLETE CBC W/AUTO DIFF WBC: CPT

## 2023-05-31 PROCEDURE — 80048 BASIC METABOLIC PNL TOTAL CA: CPT

## 2023-05-31 PROCEDURE — 85610 PROTHROMBIN TIME: CPT

## 2023-05-31 PROCEDURE — 85730 THROMBOPLASTIN TIME PARTIAL: CPT

## 2023-05-31 PROCEDURE — 93356 MYOCRD STRAIN IMG SPCKL TRCK: CPT

## 2023-05-31 PROCEDURE — 86140 C-REACTIVE PROTEIN: CPT

## 2023-05-31 PROCEDURE — 83735 ASSAY OF MAGNESIUM: CPT

## 2023-05-31 PROCEDURE — 83690 ASSAY OF LIPASE: CPT

## 2023-05-31 PROCEDURE — 84295 ASSAY OF SERUM SODIUM: CPT

## 2023-05-31 PROCEDURE — 84484 ASSAY OF TROPONIN QUANT: CPT

## 2023-05-31 PROCEDURE — 82553 CREATINE MB FRACTION: CPT

## 2023-05-31 PROCEDURE — 71045 X-RAY EXAM CHEST 1 VIEW: CPT

## 2023-05-31 PROCEDURE — 84100 ASSAY OF PHOSPHORUS: CPT

## 2023-05-31 PROCEDURE — 82947 ASSAY GLUCOSE BLOOD QUANT: CPT

## 2023-05-31 PROCEDURE — 85018 HEMOGLOBIN: CPT

## 2023-05-31 PROCEDURE — 93308 TTE F-UP OR LMTD: CPT

## 2023-05-31 PROCEDURE — 82565 ASSAY OF CREATININE: CPT

## 2023-05-31 PROCEDURE — 87040 BLOOD CULTURE FOR BACTERIA: CPT

## 2023-05-31 PROCEDURE — 36415 COLL VENOUS BLD VENIPUNCTURE: CPT

## 2023-05-31 PROCEDURE — 85652 RBC SED RATE AUTOMATED: CPT

## 2023-05-31 PROCEDURE — 83605 ASSAY OF LACTIC ACID: CPT

## 2023-05-31 PROCEDURE — 76376 3D RENDER W/INTRP POSTPROCES: CPT

## 2023-05-31 PROCEDURE — 80053 COMPREHEN METABOLIC PANEL: CPT

## 2023-05-31 PROCEDURE — 93306 TTE W/DOPPLER COMPLETE: CPT

## 2023-05-31 PROCEDURE — 99285 EMERGENCY DEPT VISIT HI MDM: CPT

## 2023-05-31 PROCEDURE — 82330 ASSAY OF CALCIUM: CPT

## 2023-05-31 PROCEDURE — 85014 HEMATOCRIT: CPT

## 2023-05-31 PROCEDURE — 82803 BLOOD GASES ANY COMBINATION: CPT

## 2023-05-31 PROCEDURE — 83880 ASSAY OF NATRIURETIC PEPTIDE: CPT

## 2023-05-31 PROCEDURE — 85027 COMPLETE CBC AUTOMATED: CPT

## 2023-05-31 PROCEDURE — 84132 ASSAY OF SERUM POTASSIUM: CPT

## 2023-05-31 RX ORDER — ASPIRIN/CALCIUM CARB/MAGNESIUM 324 MG
1 TABLET ORAL
Qty: 0 | Refills: 0 | DISCHARGE
Start: 2023-05-31

## 2023-05-31 RX ORDER — ASPIRIN/CALCIUM CARB/MAGNESIUM 324 MG
1 TABLET ORAL
Qty: 30 | Refills: 0
Start: 2023-05-31 | End: 2023-06-29

## 2023-05-31 RX ORDER — PANTOPRAZOLE SODIUM 20 MG/1
1 TABLET, DELAYED RELEASE ORAL
Qty: 0 | Refills: 0 | DISCHARGE
Start: 2023-05-31

## 2023-05-31 RX ORDER — OXYCODONE HYDROCHLORIDE 5 MG/1
1 TABLET ORAL
Qty: 28 | Refills: 0
Start: 2023-05-31 | End: 2023-06-06

## 2023-05-31 RX ORDER — WARFARIN SODIUM 2.5 MG/1
1 TABLET ORAL
Qty: 30 | Refills: 0
Start: 2023-05-31 | End: 2023-06-29

## 2023-05-31 RX ORDER — SENNA PLUS 8.6 MG/1
2 TABLET ORAL
Qty: 30 | Refills: 0
Start: 2023-05-31

## 2023-05-31 RX ADMIN — Medication 50 MILLIGRAM(S): at 06:10

## 2023-05-31 RX ADMIN — Medication 81 MILLIGRAM(S): at 11:51

## 2023-05-31 RX ADMIN — PANTOPRAZOLE SODIUM 40 MILLIGRAM(S): 20 TABLET, DELAYED RELEASE ORAL at 06:13

## 2023-05-31 NOTE — DISCHARGE NOTE PROVIDER - NSDCFUADDINST_GEN_ALL_CORE_FT
Please take 3mg Coumadin tonight on 5/31/23.   Please follow up with Dr. Colt Kam in [place] on 6/5/23 for next INR check.

## 2023-05-31 NOTE — DISCHARGE NOTE NURSING/CASE MANAGEMENT/SOCIAL WORK - PATIENT PORTAL LINK FT
You can access the FollowMyHealth Patient Portal offered by [facility] by registering at the following website: [email or web address]. By joining [facility] FollowMyHealth portal, you will also be able to view your health information using other applications (apps) compatible with our system.

## 2023-05-31 NOTE — DISCHARGE NOTE PROVIDER - HOSPITAL COURSE
37M PMHx: DM, HTN, HLD s/p  5/12 OR for Mechanical AVR  (Scotty 23mm) /w Dr. Chavez for severe AI secondary strep bacteremia. Pt discharged 5/18 and presents to ED c/o reproducible pain midsternal incision.      5/30 Patient cleared for discharge. Refusing to go home because of INR. 24 hour notice given. Plan for discharge home tomorrow.  5/31 Patient cleared for discharge today.  INR today 2.28.  To received 3mg tonight. Will f/u /w Dr. Colt Bridges on Monday 6/5 for f/u INR.

## 2023-05-31 NOTE — DISCHARGE NOTE PROVIDER - NSDCMRMEDTOKEN_GEN_ALL_CORE_FT
acetaminophen 650 mg oral tablet, extended release: 2 tab(s) orally every 8 hours as needed for  chest pain  aspirin 81 mg oral delayed release tablet: 1 tab(s) orally once a day  metoprolol succinate 50 mg oral tablet, extended release: 1 tab(s) orally once a day  pantoprazole 40 mg oral delayed release tablet: 1 tab(s) orally once a day (before a meal)  Percocet 5 mg-325 mg oral tablet: 1 tab(s) orally every 6 hours as needed for  chest pain MDD: four tabs  senna leaf extract oral tablet: 2 tab(s) orally once a day (at bedtime) as needed for  constipation  warfarin 3 mg oral tablet: 1 tab(s) orally once a day (at bedtime)   aspirin 81 mg oral delayed release tablet: 1 tab(s) orally once a day  aspirin 81 mg oral delayed release tablet: 1 tab(s) orally once a day  metoprolol succinate 50 mg oral tablet, extended release: 1 tab(s) orally once a day  oxyCODONE 5 mg oral tablet: 1 tab(s) orally every 6 hours as needed for  moderate pain MDD 4TABS  pantoprazole 40 mg oral delayed release tablet: 1 tab(s) orally once a day (before a meal)  senna leaf extract oral tablet: 2 tab(s) orally once a day (at bedtime) as needed for  constipation  senna leaf extract oral tablet: 2 tab(s) orally once a day (at bedtime)  warfarin 3 mg oral tablet: 1 tab(s) orally once a day (at bedtime)

## 2023-05-31 NOTE — DISCHARGE NOTE PROVIDER - NSDCPNSUBOBJ_GEN_ALL_CORE
Subjective: "I feel ok "  Chest pain improved.  No shortness of breath/abdominal pain.     TELEMETRY: NSR 70s-80s    VITAL SIGNS    Vital Signs Last 24 Hrs  T(C): 37.1 (23 @ 05:05), Max: 37.1 (23 @ 19:38)  T(F): 98.8 (23 @ 05:05), Max: 98.8 (23 @ 19:38)  HR: 74 (23 @ 05:05) (74 - 81)  BP: 110/69 (23 @ 05:05) (105/72 - 110/69)  RR: 18 (23 @ 05:05) (18 - 18)  SpO2: 100% (23 @ 05:05) (99% - 100%)             @ 07:01  -   @ 07:00  --------------------------------------------------------  IN: 240 mL / OUT: 300 mL / NET: -60 mL       Daily     Daily Weight in k.7 (31 May 2023 08:06)  Admit Wt: Drug Dosing Weight  Height (cm): 167.6 (29 May 2023 02:04)  Weight (kg): 77.1 (29 May 2023 02:04)  BMI (kg/m2): 27.4 (29 May 2023 02:04)  BSA (m2): 1.87 (29 May 2023 02:04)      CAPILLARY BLOOD GLUCOSE                  PHYSICAL EXAM    Neurology: alert and oriented x 4, nonfocal, no gross deficits  CV : RRR +S1/S2  Sternal Wound :  JOEY,  Stable  Lungs: CTA BL. RR easy, unlabored   Abdomen: soft, nontender, nondistended, positive bowel sounds, +bowel movement   Neg N/V/D   :  pt voiding without difficulty   Extremities:  Able to ISRAEL; No peripheral edema, neg calf tenderness.   PPP bilaterally            aspirin enteric coated 81 milliGRAM(s) Oral daily  metoprolol succinate ER 50 milliGRAM(s) Oral daily  oxycodone    5 mG/acetaminophen 325 mG 2 Tablet(s) Oral every 3 hours PRN  oxycodone    5 mG/acetaminophen 325 mG 1 Tablet(s) Oral every 3 hours PRN  pantoprazole    Tablet 40 milliGRAM(s) Oral before breakfast  senna 2 Tablet(s) Oral at bedtime

## 2023-05-31 NOTE — DISCHARGE NOTE PROVIDER - NSDCCPCAREPLAN_GEN_ALL_CORE_FT
PRINCIPAL DISCHARGE DIAGNOSIS  Diagnosis: Chest pain  Assessment and Plan of Treatment: Please follow up on Monday 6/5/23 with Dr. Colt Bridges for INR draw.   .1. Daily Shower  2. Weight yourself daily and notify any weight gain greater than 2-3 pounds in 24 hours.  3. Regular diet - low fat, low cholesterol, no added salt.  4. Cleanse Midsternal incision and leg incision daily while showering with warm water and mild soap, pat dry and maintain open to air.   5. Follow Cardiac Surgery Do's and Don'ts discharge instructions.   7. No heavy lifting nothing greater than 5 pounds until cleared by MD.   8. Call / Notify MD any fever greater than 101.0  9. Increase Activity as tolerated.

## 2023-06-01 ENCOUNTER — NON-APPOINTMENT (OUTPATIENT)
Age: 38
End: 2023-06-01

## 2023-06-03 LAB
CULTURE RESULTS: SIGNIFICANT CHANGE UP
CULTURE RESULTS: SIGNIFICANT CHANGE UP
SPECIMEN SOURCE: SIGNIFICANT CHANGE UP
SPECIMEN SOURCE: SIGNIFICANT CHANGE UP

## 2023-06-05 ENCOUNTER — NON-APPOINTMENT (OUTPATIENT)
Age: 38
End: 2023-06-05

## 2023-06-05 LAB — SURGICAL PATHOLOGY STUDY: SIGNIFICANT CHANGE UP

## 2023-06-20 ENCOUNTER — TRANSCRIPTION ENCOUNTER (OUTPATIENT)
Age: 38
End: 2023-06-20

## 2023-06-20 RX ORDER — PANTOPRAZOLE 40 MG/1
40 TABLET, DELAYED RELEASE ORAL
Qty: 30 | Refills: 0 | Status: ACTIVE | COMMUNITY
Start: 1900-01-01 | End: 1900-01-01

## 2023-11-21 NOTE — DISCHARGE NOTE PROVIDER - DID THE PATIENT PRESENT WITH OR WAS TREATED FOR MALNUTRITION DURING THIS ADMISSION
Chief Complaint   Patient [facility] with    [facility]     Oncology History   Squamous cell cancer of tongue   6/16/2023 Initial Diagnosis    Squamous cell cancer of tongue     7/12/2023 Surgery    1. Left partial glossectomy  2. Left modified neck dissection of levels 1B through 4     7/31/2023 Cancer Staged     Cancer Staging   Squamous cell cancer of tongue  Staging form: Oral Cavity, AJCC 8th Edition  - Pathologic stage from 7/31/2023: Stage ELENITA (pT1, pN2b, cM0) - Signed by Sindi Robbins NP on 7/31/2023 7/31/2023 Cancer Staged    Staging form: Oral Cavity, AJCC 8th Edition  - Pathologic stage from 7/31/2023: Stage ELENITA (pT1, pN2b, cM0)     8/23/2023 - 10/5/2023 Radiation Therapy    Treating physician: Cruzito Lopes  Treatment Summary  Course: C1 H&N 2023  Treatment Site Ref. ID Energy Dose/Fx (Gy) #Fx Dose Correction (Gy) Total Dose (Gy) Start Date End Date Elapsed Days   IM H&N PTV_High 6X 2 30 / 30 0 60 8/23/2023 10/5/2023 43            HPI   82 y.o. male presents with the above treatment history.  He is doing well overall.  Complains mainly of a loss of taste after radiation.  Doing well otherwise.    Review of Systems   Constitutional: Negative for fatigue and unexpected weight change.   HENT: Per HPI.  Eyes: Negative for visual disturbance.   Respiratory: Negative for shortness of breath, hemoptysis   Cardiovascular: Negative for chest pain and palpitations.   Musculoskeletal: Negative for decreased ROM, back pain.   Skin: Negative for rash, sunburn, itching.   Neurological: Negative for dizziness and seizures.   Hematological: Negative for adenopathy. Does not bruise/bleed easily.   Endocrine: Negative for rapid weight loss/weight gain, heat/cold intolerance.     Past Medical History   Patient Active Problem List   Diagnosis    Ocular hypertension    Essential hypertension    Screen for colon cancer    Type 2 diabetes mellitus with diabetic autonomic neuropathy, with long-term current  use of insulin    Adenomatous polyp    Family history of colon cancer    Nuclear cataract    Borderline glaucoma of both eyes with ocular hypertension    Acute cystitis without hematuria    Spinal stenosis, lumbar region, with neurogenic claudication    EMA (iron deficiency anemia)    Benign prostatic hyperplasia without lower urinary tract symptoms    Debility    Chronic midline low back pain with sciatica    Squamous cell cancer of tongue    Incomplete bladder emptying           Past Surgical History   Past Surgical History:   Procedure Laterality Date    CHOLECYSTECTOMY      GLOSSECTOMY Left 2023    Procedure: GLOSSECTOMY;  Surgeon: Jaxon Carmen MD;  Location: [facility] OR [address] [place] [address];  Service: ENT;  Laterality: Left;    RADICAL NECK DISSECTION Left 2023    Procedure: DISSECTION, NECK, RADICAL;  Surgeon: Jaxon Carmen MD;  Location: [facility] OR [address] [place] [address];  Service: ENT;  Laterality: Left;         Family History   Family History   Problem Relation Age of Onset    Cancer Mother         liver    Cancer Father         colon    No Known Problems Sister     No Known Problems Sister     Amblyopia Neg Hx     Blindness Neg Hx     Cataracts Neg Hx     Diabetes Neg Hx     Glaucoma Neg Hx     Hypertension Neg Hx     Macular degeneration Neg Hx     Retinal detachment Neg Hx     Strabismus Neg Hx     Stroke Neg Hx     Thyroid disease Neg Hx            Social History   .  Social History     Socioeconomic History    Marital status:    Occupational History     Employer: OTHER   Tobacco Use    Smoking status: Former     Current packs/day: 0.00     Types: Cigarettes     Quit date: 2004     Years since quittin.4     Passive exposure: Past    Smokeless tobacco: Never   Substance and Sexual Activity    Alcohol use: No     Alcohol/week: 0.0 standard drinks of alcohol    Drug use: No         Allergies   Review of patient's allergies indicates:  No Known Allergies        Physical Exam     Vitals:    23  1040   BP: 114/66   Pulse: 76         Body mass index is 23.56 kg/m².      General: AOx3, NAD   Respiratory:  Symmetric chest rise, normal effort  Oral Cavity:  Oral Tongue mobile, no lesions noted.  Well-healed left glossectomy site.  Hard Palate WNL. No buccal or FOM lesions.  Oropharynx:  No masses/lesions of the posterior pharyngeal wall. Tonsillar fossa without lesions. Soft palate without masses. Midline uvula.   Neck:  Well-healed left neck scar.  Moderate fibrosis status post radiation.  No cervical lymphadenopathy, thyromegaly or thyroid nodules.  Normal range of motion.    Face: House Brackmann I bilaterally.     Assessment/Plan  Problem List Items Addressed This Visit          Oncology    Squamous cell cancer of tongue - Primary     PRAKASH.  RTC 6 weeks.                      No

## 2024-03-08 ENCOUNTER — EMERGENCY (EMERGENCY)
Facility: HOSPITAL | Age: 39
LOS: 1 days | Discharge: HOME | End: 2024-03-08
Attending: EMERGENCY MEDICINE
Payer: MEDICAID

## 2024-03-08 VITALS
RESPIRATION RATE: 18 BRPM | SYSTOLIC BLOOD PRESSURE: 153 MMHG | HEIGHT: 69 IN | TEMPERATURE: 98 F | DIASTOLIC BLOOD PRESSURE: 88 MMHG | WEIGHT: 195.11 LBS | HEART RATE: 51 BPM | OXYGEN SATURATION: 100 %

## 2024-03-08 VITALS
DIASTOLIC BLOOD PRESSURE: 75 MMHG | TEMPERATURE: 99 F | OXYGEN SATURATION: 99 % | SYSTOLIC BLOOD PRESSURE: 152 MMHG | RESPIRATION RATE: 17 BRPM | HEART RATE: 52 BPM

## 2024-03-08 LAB
ALBUMIN SERPL ELPH-MCNC: 4.3 G/DL — SIGNIFICANT CHANGE UP (ref 3.3–5)
ALP SERPL-CCNC: 77 U/L — SIGNIFICANT CHANGE UP (ref 40–120)
ALT FLD-CCNC: 20 U/L — SIGNIFICANT CHANGE UP (ref 10–45)
ANION GAP SERPL CALC-SCNC: 7 MMOL/L — SIGNIFICANT CHANGE UP (ref 5–17)
APTT BLD: 41.2 SEC — HIGH (ref 24.5–35.6)
AST SERPL-CCNC: 18 U/L — SIGNIFICANT CHANGE UP (ref 10–40)
BASOPHILS # BLD AUTO: 0.05 K/UL — SIGNIFICANT CHANGE UP (ref 0–0.2)
BASOPHILS NFR BLD AUTO: 0.8 % — SIGNIFICANT CHANGE UP (ref 0–2)
BILIRUB SERPL-MCNC: 0.5 MG/DL — SIGNIFICANT CHANGE UP (ref 0.2–1.2)
BUN SERPL-MCNC: 15 MG/DL — SIGNIFICANT CHANGE UP (ref 7–23)
CALCIUM SERPL-MCNC: 9.4 MG/DL — SIGNIFICANT CHANGE UP (ref 8.4–10.5)
CHLORIDE SERPL-SCNC: 105 MMOL/L — SIGNIFICANT CHANGE UP (ref 96–108)
CO2 SERPL-SCNC: 27 MMOL/L — SIGNIFICANT CHANGE UP (ref 22–31)
CREAT SERPL-MCNC: 1.11 MG/DL — SIGNIFICANT CHANGE UP (ref 0.5–1.3)
EGFR: 87 ML/MIN/1.73M2 — SIGNIFICANT CHANGE UP
EOSINOPHIL # BLD AUTO: 0.14 K/UL — SIGNIFICANT CHANGE UP (ref 0–0.5)
EOSINOPHIL NFR BLD AUTO: 2.1 % — SIGNIFICANT CHANGE UP (ref 0–6)
FLUAV AG NPH QL: SIGNIFICANT CHANGE UP
FLUBV AG NPH QL: SIGNIFICANT CHANGE UP
GLUCOSE SERPL-MCNC: 89 MG/DL — SIGNIFICANT CHANGE UP (ref 70–99)
HCT VFR BLD CALC: 38.6 % — LOW (ref 39–50)
HGB BLD-MCNC: 12.3 G/DL — LOW (ref 13–17)
IMM GRANULOCYTES NFR BLD AUTO: 0.2 % — SIGNIFICANT CHANGE UP (ref 0–0.9)
INR BLD: 2.03 RATIO — HIGH (ref 0.85–1.18)
LYMPHOCYTES # BLD AUTO: 2.87 K/UL — SIGNIFICANT CHANGE UP (ref 1–3.3)
LYMPHOCYTES # BLD AUTO: 43.9 % — SIGNIFICANT CHANGE UP (ref 13–44)
MAGNESIUM SERPL-MCNC: 2.1 MG/DL — SIGNIFICANT CHANGE UP (ref 1.6–2.6)
MCHC RBC-ENTMCNC: 27.3 PG — SIGNIFICANT CHANGE UP (ref 27–34)
MCHC RBC-ENTMCNC: 31.9 GM/DL — LOW (ref 32–36)
MCV RBC AUTO: 85.6 FL — SIGNIFICANT CHANGE UP (ref 80–100)
MONOCYTES # BLD AUTO: 0.54 K/UL — SIGNIFICANT CHANGE UP (ref 0–0.9)
MONOCYTES NFR BLD AUTO: 8.3 % — SIGNIFICANT CHANGE UP (ref 2–14)
NEUTROPHILS # BLD AUTO: 2.93 K/UL — SIGNIFICANT CHANGE UP (ref 1.8–7.4)
NEUTROPHILS NFR BLD AUTO: 44.7 % — SIGNIFICANT CHANGE UP (ref 43–77)
NRBC # BLD: 0 /100 WBCS — SIGNIFICANT CHANGE UP (ref 0–0)
NT-PROBNP SERPL-SCNC: 363 PG/ML — HIGH (ref 0–300)
PLATELET # BLD AUTO: 224 K/UL — SIGNIFICANT CHANGE UP (ref 150–400)
POTASSIUM SERPL-MCNC: 3.8 MMOL/L — SIGNIFICANT CHANGE UP (ref 3.5–5.3)
POTASSIUM SERPL-SCNC: 3.8 MMOL/L — SIGNIFICANT CHANGE UP (ref 3.5–5.3)
PROCALCITONIN SERPL-MCNC: 0.05 NG/ML — SIGNIFICANT CHANGE UP (ref 0.02–0.1)
PROT SERPL-MCNC: 7.4 G/DL — SIGNIFICANT CHANGE UP (ref 6–8.3)
PROTHROM AB SERPL-ACNC: 20.9 SEC — HIGH (ref 9.5–13)
RBC # BLD: 4.51 M/UL — SIGNIFICANT CHANGE UP (ref 4.2–5.8)
RBC # FLD: 13.9 % — SIGNIFICANT CHANGE UP (ref 10.3–14.5)
RSV RNA NPH QL NAA+NON-PROBE: SIGNIFICANT CHANGE UP
SARS-COV-2 RNA SPEC QL NAA+PROBE: SIGNIFICANT CHANGE UP
SODIUM SERPL-SCNC: 139 MMOL/L — SIGNIFICANT CHANGE UP (ref 135–145)
TROPONIN T, HIGH SENSITIVITY RESULT: <6 NG/L — SIGNIFICANT CHANGE UP (ref 0–51)
WBC # BLD: 6.54 K/UL — SIGNIFICANT CHANGE UP (ref 3.8–10.5)
WBC # FLD AUTO: 6.54 K/UL — SIGNIFICANT CHANGE UP (ref 3.8–10.5)

## 2024-03-08 PROCEDURE — 83880 ASSAY OF NATRIURETIC PEPTIDE: CPT

## 2024-03-08 PROCEDURE — 87637 SARSCOV2&INF A&B&RSV AMP PRB: CPT

## 2024-03-08 PROCEDURE — 93005 ELECTROCARDIOGRAM TRACING: CPT

## 2024-03-08 PROCEDURE — 85025 COMPLETE CBC W/AUTO DIFF WBC: CPT

## 2024-03-08 PROCEDURE — 71046 X-RAY EXAM CHEST 2 VIEWS: CPT | Mod: 26

## 2024-03-08 PROCEDURE — 99285 EMERGENCY DEPT VISIT HI MDM: CPT

## 2024-03-08 PROCEDURE — 84484 ASSAY OF TROPONIN QUANT: CPT

## 2024-03-08 PROCEDURE — 71046 X-RAY EXAM CHEST 2 VIEWS: CPT

## 2024-03-08 PROCEDURE — 85730 THROMBOPLASTIN TIME PARTIAL: CPT

## 2024-03-08 PROCEDURE — 85610 PROTHROMBIN TIME: CPT

## 2024-03-08 PROCEDURE — 80053 COMPREHEN METABOLIC PANEL: CPT

## 2024-03-08 PROCEDURE — 83735 ASSAY OF MAGNESIUM: CPT

## 2024-03-08 PROCEDURE — 99285 EMERGENCY DEPT VISIT HI MDM: CPT | Mod: 25

## 2024-03-08 PROCEDURE — 84145 PROCALCITONIN (PCT): CPT

## 2024-03-08 NOTE — ED ADULT NURSE NOTE - NSFALLUNIVINTERV_ED_ALL_ED
Bed/Stretcher in lowest position, wheels locked, appropriate side rails in place/Call bell, personal items and telephone in reach/Instruct patient to call for assistance before getting out of bed/chair/stretcher/Non-slip footwear applied when patient is off stretcher/Organ to call system/Physically safe environment - no spills, clutter or unnecessary equipment/Purposeful proactive rounding/Room/bathroom lighting operational, light cord in reach

## 2024-03-08 NOTE — ED ADULT NURSE NOTE - OBJECTIVE STATEMENT
Pt is a 38y M c/o recent nasal congestion, productive cough, bloody streaks in sputum. Pt denies fever, chills. Pt is well appearing, A&Ox3, neuro status intact, breathing unlabored and spontaneous, full ROM of all extremities. Pt resting in stretcher, bed in lowest position, aware of plan of care. Comfort and safety measures maintained.

## 2024-03-08 NOTE — ED ADULT NURSE NOTE - FINAL NURSING ELECTRONIC SIGNATURE
Patient presents with one week history of left foot swelling. Left foot x-ray with osteomyelitis of the first toe. , .1 and WBC 10.81.  - VA duplex of left foot pending  - MRI ordered  - wound culture pending  - blood cultures pending  -c/w broad spectrum antibiotics as cultures will likely be polymicrobial   - patient was seen by Podiatry is booked for surgery on Thursday for OM resection with Dr. Izquierdo; will need medical clearance.  - EKG ordered  - Surgery following; no vascular intervention at this time   - wound care  - acetaminophen prn for pain
08-Mar-2024 23:35

## 2024-03-08 NOTE — ED PROVIDER NOTE - OBJECTIVE STATEMENT
38-year-old male history of endocarditis with aortic valve replacement now on warfarin, HTN, prediabetes presenting with hemoptysis.  The patient reports that his symptoms began this evening.  The patient states he has never had symptoms like this before.  The patient denies any recent coughs any recent illnesses any recent viral URI-like symptoms.  The patient is not having any chest pain or shortness of breath with his symptoms.  The patient reports that he was coughing into the toilet and it was right red blood that filled the toilet.  The patient states that the blood gradually got lighter became streaks of blood.  The patient denies fevers, chills, chest pain, shortness of breath, headache, visual changes, nausea or vomiting.

## 2024-03-08 NOTE — ED ADULT NURSE NOTE - NS ED NURSE LEVEL OF CONSCIOUSNESS ORIENTATION
Next Appt:    With [facility] Teo Reed MD)  04/08/2021 at 6:30 PM    LV 3-28-20    LR 1-13-21 Oriented - self; Oriented - place; Oriented - time

## 2024-03-08 NOTE — ED PROVIDER NOTE - ATTENDING CONTRIBUTION TO CARE
------------ATTENDING NOTE------------  pt brought to ED by EMS c/o recent nasal congestion, clear rhinorrhea, cough w/ occasional scant sputum, concerned that he had streaks of red blood w/ sputum, no fevers, was an another ED today and given Azithromycin "just in case", complicated by h/o AVR from bacterial endocarditis, clear chest w/o distress, nml cardiac exam, awaiting labs/imaging and close reassessments -->  - Torsten Vidal MD   -------------------------------------------------- ------------ATTENDING NOTE------------  pt brought to ED by EMS c/o recent nasal congestion, clear rhinorrhea, cough w/ occasional scant sputum, concerned that he had streaks of red blood w/ sputum, no fevers, was an another ED today and given Azithromycin "just in case", complicated by h/o AVR from bacterial endocarditis, clear chest w/o distress, nml cardiac exam, awaiting labs/imaging and close reassessments --> labs/imaging wnl, benign repeat exams, in depth dw pt about ddx, tx, [place], continued close outpt fu.  - Torsten Vidal MD   --------------------------------------------------

## 2024-03-08 NOTE — ED PROVIDER NOTE - NSFOLLOWUPINSTRUCTIONS_ED_ALL_ED_FT
See your Primary Doctor / Specialists next week for follow up -- call to discuss.    Stay well hydrated, eat regular healthy meals, get plenty of rest, continue current medications.    See [facility] information and return instructions given to you.    Seek immediate medical care for new/worsening symptoms/concerns.

## 2024-03-08 NOTE — ED PROVIDER NOTE - PATIENT PORTAL LINK FT
You can access the FollowMyHealth Patient Portal offered by [facility] by registering at the following website: [email or web address]. By joining "[employer]’s FollowMyHealth portal, you will also be able to view your health information using other applications (apps) compatible with our system.

## 2024-05-17 NOTE — ED CLERICAL - NS ED CLERK PCP INFORMATION
[facility]  [address]  [place] [address]  Phone [phone]  Fax:  [phone]    Shaq Hobsb Jr. (:  1961) is a 62 y.o. male here for evaluation of the following chief complaint(s):  Follow-up (Follow up from labwork), Discuss Labs (Discuss labs), and Other (Discuss getting an insulin pump - pt has not taken blood pressure medication yet)    Assessment & Plan   ASSESSMENT/PLAN:  1. Type 2 diabetes mellitus with hyperglycemia, with long-term current use of insulin ([facility])  -     CGM Interpretation  -     [facility]  2. Essential hypertension  3. Anxiety and depression    Recent hemoglobin A1c stable at 8.5, diabetes remains poorly controlled.  Last 2 weeks of Dexcom data reviewed.  Patient's glucose is consistently running high with no hypoglycemia.  Tends to run highest in midmorning and after dinner.  Increase Tresiba from 54 to 58 units daily.  Increase Humalog breakfast and dinner doses from 15 to 20 units and continue lunchtime 15 unit dose.  Continue metformin 1000 mg twice daily.  Patient is interested in looking into insulin pump.  He will need to see endocrinology to discuss this.  Referral placed.    Blood pressure borderline high today, continue lisinopril 10 mg daily.  Monitor BP at home.  Lipid panel stable except for mildly elevated triglycerides, continue Lipitor 20 mg daily.     Continue Lexapro for anxiety/depression.     Followed by oncology for diffuse large B-cell lymphoma, s/p chemo.     Up-to-date on diabetic eye exam.  Colonoscopy 2022 showed 2 polyps and external hemorrhoid.  Normal PSA 2024.    Return in about 3 months (around 2024) for labs prior, routine f/u.         Subjective   SUBJECTIVE/OBJECTIVE:  HPI  62-year-old male with PMH of CAD, HTN, DM2, HLD, and diffuse large B-cell lymphoma who presents for regular follow-up of chronic medical problems.    -On Tresiba 54u daily, Humalog 15u TID with  Yes

## 2025-02-24 ENCOUNTER — NON-APPOINTMENT (OUTPATIENT)
Age: 40
End: 2025-02-24

## 2025-02-24 ENCOUNTER — APPOINTMENT (OUTPATIENT)
Dept: CARDIOLOGY | Facility: CLINIC | Age: 40
End: 2025-02-24
Payer: MEDICAID

## 2025-02-24 ENCOUNTER — APPOINTMENT (OUTPATIENT)
Dept: CARDIOLOGY | Facility: CLINIC | Age: 40
End: 2025-02-24

## 2025-02-24 VITALS
DIASTOLIC BLOOD PRESSURE: 80 MMHG | SYSTOLIC BLOOD PRESSURE: 140 MMHG | HEART RATE: 60 BPM | HEIGHT: 66 IN | WEIGHT: 197.13 LBS | BODY MASS INDEX: 31.68 KG/M2 | RESPIRATION RATE: 16 BRPM | OXYGEN SATURATION: 99 %

## 2025-02-24 DIAGNOSIS — I10 ESSENTIAL (PRIMARY) HYPERTENSION: ICD-10-CM

## 2025-02-24 DIAGNOSIS — Z95.2 PRESENCE OF PROSTHETIC HEART VALVE: ICD-10-CM

## 2025-02-24 DIAGNOSIS — E11.49 TYPE 2 DIABETES MELLITUS WITH OTHER DIABETIC NEUROLOGICAL COMPLICATION: ICD-10-CM

## 2025-02-24 DIAGNOSIS — I77.810 THORACIC AORTIC ECTASIA: ICD-10-CM

## 2025-02-24 DIAGNOSIS — R06.02 SHORTNESS OF BREATH: ICD-10-CM

## 2025-02-24 DIAGNOSIS — E78.2 MIXED HYPERLIPIDEMIA: ICD-10-CM

## 2025-02-24 DIAGNOSIS — R42 DIZZINESS AND GIDDINESS: ICD-10-CM

## 2025-02-24 DIAGNOSIS — Z98.890 OTHER SPECIFIED POSTPROCEDURAL STATES: ICD-10-CM

## 2025-02-24 PROCEDURE — G2211 COMPLEX E/M VISIT ADD ON: CPT | Mod: NC

## 2025-02-24 PROCEDURE — 99204 OFFICE O/P NEW MOD 45 MIN: CPT

## 2025-02-24 PROCEDURE — 93242 EXT ECG>48HR<7D RECORDING: CPT

## 2025-02-24 PROCEDURE — 93000 ELECTROCARDIOGRAM COMPLETE: CPT

## 2025-02-24 RX ORDER — WARFARIN 1 MG/1
1 TABLET ORAL
Refills: 0 | Status: ACTIVE | COMMUNITY

## 2025-02-24 RX ORDER — VALSARTAN 40 MG/1
40 TABLET, COATED ORAL DAILY
Qty: 90 | Refills: 2 | Status: ACTIVE | COMMUNITY
Start: 2025-02-24 | End: 1900-01-01

## 2025-02-24 RX ORDER — METOPROLOL SUCCINATE 50 MG/1
50 TABLET, EXTENDED RELEASE ORAL DAILY
Qty: 90 | Refills: 1 | Status: ACTIVE | COMMUNITY
Start: 2025-02-24

## 2025-02-24 RX ORDER — METOPROLOL SUCCINATE 25 MG/1
25 TABLET, EXTENDED RELEASE ORAL
Qty: 90 | Refills: 2 | Status: ACTIVE | COMMUNITY
Start: 2025-02-24 | End: 1900-01-01

## 2025-02-24 RX ORDER — AMOXICILLIN 500 MG/1
500 TABLET, FILM COATED ORAL
Qty: 20 | Refills: 2 | Status: ACTIVE | COMMUNITY
Start: 2025-02-24 | End: 1900-01-01

## 2025-02-27 LAB — INR PPP: 3.7

## 2025-03-12 ENCOUNTER — APPOINTMENT (OUTPATIENT)
Dept: CARDIOLOGY | Facility: CLINIC | Age: 40
End: 2025-03-12

## 2025-03-24 ENCOUNTER — NON-APPOINTMENT (OUTPATIENT)
Age: 40
End: 2025-03-24

## 2025-03-24 ENCOUNTER — APPOINTMENT (OUTPATIENT)
Dept: CARDIOLOGY | Facility: CLINIC | Age: 40
End: 2025-03-24
Payer: MEDICAID

## 2025-03-24 VITALS
BODY MASS INDEX: 31.66 KG/M2 | HEIGHT: 66 IN | HEART RATE: 55 BPM | WEIGHT: 197 LBS | DIASTOLIC BLOOD PRESSURE: 90 MMHG | OXYGEN SATURATION: 98 % | SYSTOLIC BLOOD PRESSURE: 148 MMHG | RESPIRATION RATE: 18 BRPM

## 2025-03-24 DIAGNOSIS — Z98.890 OTHER SPECIFIED POSTPROCEDURAL STATES: ICD-10-CM

## 2025-03-24 DIAGNOSIS — I10 ESSENTIAL (PRIMARY) HYPERTENSION: ICD-10-CM

## 2025-03-24 DIAGNOSIS — I77.810 THORACIC AORTIC ECTASIA: ICD-10-CM

## 2025-03-24 DIAGNOSIS — Z95.2 PRESENCE OF PROSTHETIC HEART VALVE: ICD-10-CM

## 2025-03-24 DIAGNOSIS — F32.A DEPRESSION, UNSPECIFIED: ICD-10-CM

## 2025-03-24 PROCEDURE — G2211 COMPLEX E/M VISIT ADD ON: CPT | Mod: NC

## 2025-03-24 PROCEDURE — 99214 OFFICE O/P EST MOD 30 MIN: CPT

## 2025-03-24 PROCEDURE — 93000 ELECTROCARDIOGRAM COMPLETE: CPT

## 2025-03-24 RX ORDER — VALSARTAN 80 MG/1
80 TABLET, COATED ORAL DAILY
Qty: 90 | Refills: 2 | Status: ACTIVE | COMMUNITY
Start: 2025-03-24 | End: 1900-01-01

## 2025-06-10 ENCOUNTER — NON-APPOINTMENT (OUTPATIENT)
Age: 40
End: 2025-06-10

## 2025-06-10 ENCOUNTER — APPOINTMENT (OUTPATIENT)
Dept: CARDIOLOGY | Facility: CLINIC | Age: 40
End: 2025-06-10
Payer: MEDICAID

## 2025-06-10 VITALS
SYSTOLIC BLOOD PRESSURE: 140 MMHG | OXYGEN SATURATION: 98 % | HEART RATE: 56 BPM | WEIGHT: 200 LBS | HEIGHT: 66 IN | DIASTOLIC BLOOD PRESSURE: 98 MMHG | BODY MASS INDEX: 32.14 KG/M2 | RESPIRATION RATE: 16 BRPM

## 2025-06-10 PROCEDURE — G2211 COMPLEX E/M VISIT ADD ON: CPT | Mod: NC

## 2025-06-10 PROCEDURE — 99214 OFFICE O/P EST MOD 30 MIN: CPT

## 2025-06-10 PROCEDURE — 93000 ELECTROCARDIOGRAM COMPLETE: CPT

## 2025-06-10 RX ORDER — VALSARTAN AND HYDROCHLOROTHIAZIDE 80; 12.5 MG/1; MG/1
80-12.5 TABLET, FILM COATED ORAL DAILY
Qty: 90 | Refills: 1 | Status: ACTIVE | COMMUNITY
Start: 2025-06-10 | End: 1900-01-01

## 2025-06-20 ENCOUNTER — APPOINTMENT (OUTPATIENT)
Dept: CT IMAGING | Facility: CLINIC | Age: 40
End: 2025-06-20

## 2025-06-20 PROCEDURE — 71275 CT ANGIOGRAPHY CHEST: CPT

## 2025-07-11 ENCOUNTER — APPOINTMENT (OUTPATIENT)
Dept: CARDIOLOGY | Facility: CLINIC | Age: 40
End: 2025-07-11

## 2025-07-11 PROCEDURE — 93793 ANTICOAG MGMT PT WARFARIN: CPT

## 2025-07-14 LAB — INR PPP: 3

## 2025-07-25 ENCOUNTER — APPOINTMENT (OUTPATIENT)
Dept: CARDIOLOGY | Facility: CLINIC | Age: 40
End: 2025-07-25

## 2025-08-08 ENCOUNTER — APPOINTMENT (OUTPATIENT)
Dept: CARDIOLOGY | Facility: CLINIC | Age: 40
End: 2025-08-08

## 2025-08-08 LAB — INR PPP: 1.8

## 2025-08-15 ENCOUNTER — TRANSCRIPTION ENCOUNTER (OUTPATIENT)
Age: 40
End: 2025-08-15

## 2025-08-15 ENCOUNTER — APPOINTMENT (OUTPATIENT)
Dept: CARDIOLOGY | Facility: CLINIC | Age: 40
End: 2025-08-15

## 2025-08-15 LAB — INR PPP: 2.7

## 2025-08-21 ENCOUNTER — APPOINTMENT (OUTPATIENT)
Dept: CARDIOLOGY | Facility: CLINIC | Age: 40
End: 2025-08-21
Payer: MEDICAID

## 2025-08-21 LAB — INR PPP: 3

## 2025-08-21 PROCEDURE — 93793 ANTICOAG MGMT PT WARFARIN: CPT

## 2025-08-21 RX ORDER — WARFARIN 6 MG/1
6 TABLET ORAL DAILY
Qty: 90 | Refills: 2 | Status: ACTIVE | COMMUNITY
Start: 2025-08-15 | End: 1900-01-01

## 2025-08-21 RX ORDER — WARFARIN 7.5 MG/1
7.5 TABLET ORAL AS DIRECTED
Qty: 90 | Refills: 0 | Status: DISCONTINUED | COMMUNITY
Start: 2025-08-21 | End: 2025-08-21

## 2025-09-08 ENCOUNTER — APPOINTMENT (OUTPATIENT)
Dept: CARDIOLOGY | Facility: CLINIC | Age: 40
End: 2025-09-08

## 2025-09-10 ENCOUNTER — APPOINTMENT (OUTPATIENT)
Dept: CARDIOLOGY | Facility: CLINIC | Age: 40
End: 2025-09-10
Payer: MEDICAID

## 2025-09-10 ENCOUNTER — NON-APPOINTMENT (OUTPATIENT)
Age: 40
End: 2025-09-10

## 2025-09-10 VITALS
WEIGHT: 198 LBS | RESPIRATION RATE: 20 BRPM | OXYGEN SATURATION: 98 % | SYSTOLIC BLOOD PRESSURE: 126 MMHG | HEIGHT: 66 IN | HEART RATE: 67 BPM | BODY MASS INDEX: 31.82 KG/M2 | DIASTOLIC BLOOD PRESSURE: 80 MMHG

## 2025-09-10 DIAGNOSIS — R06.02 SHORTNESS OF BREATH: ICD-10-CM

## 2025-09-10 DIAGNOSIS — I10 ESSENTIAL (PRIMARY) HYPERTENSION: ICD-10-CM

## 2025-09-10 DIAGNOSIS — E78.2 MIXED HYPERLIPIDEMIA: ICD-10-CM

## 2025-09-10 DIAGNOSIS — Z98.890 OTHER SPECIFIED POSTPROCEDURAL STATES: ICD-10-CM

## 2025-09-10 DIAGNOSIS — I77.810 THORACIC AORTIC ECTASIA: ICD-10-CM

## 2025-09-10 LAB — INR PPP: 2.7

## 2025-09-10 PROCEDURE — 93000 ELECTROCARDIOGRAM COMPLETE: CPT

## 2025-09-10 PROCEDURE — 99214 OFFICE O/P EST MOD 30 MIN: CPT

## 2025-09-10 PROCEDURE — G2211 COMPLEX E/M VISIT ADD ON: CPT | Mod: NC

## 2025-09-11 DIAGNOSIS — I35.9 NONRHEUMATIC AORTIC VALVE DISORDER, UNSPECIFIED: ICD-10-CM

## 2025-09-11 DIAGNOSIS — Z95.2 PRESENCE OF PROSTHETIC HEART VALVE: ICD-10-CM

## (undated) DEVICE — SYR ASEPTO

## (undated) DEVICE — SUT PLEDGET PRE PUNCH 4.8 X 9.5 X 1.5 MM

## (undated) DEVICE — SUT POLYSORB 3-0 30" V-20 UNDYED

## (undated) DEVICE — GLV 8 PROTEXIS (WHITE)

## (undated) DEVICE — POSITIONER CARDIAC BUMP

## (undated) DEVICE — SYR LUER LOK 50CC

## (undated) DEVICE — PACK CUSTOM W/INSPIRE OXYGENATOR

## (undated) DEVICE — SUT PROLENE 4-0 36" BB

## (undated) DEVICE — GOWN LG

## (undated) DEVICE — SUT SOFSILK 4-0 24" CV-15

## (undated) DEVICE — PACING CABLE BI-V TEMP ALLIGATOR CLIP 8FR

## (undated) DEVICE — GOWN TRIMAX LG

## (undated) DEVICE — STAPLER SKIN VISI-STAT 35 WIDE

## (undated) DEVICE — GLV 7 PROTEXIS (WHITE)

## (undated) DEVICE — STOPCOCK 4-WAY 2 GANG W SWIVEL MALE LUER LOCK

## (undated) DEVICE — SUT POLYSORB 0 36" GS-25 UNDYED

## (undated) DEVICE — SOL NORMOSOL-R PH7.4 1000ML

## (undated) DEVICE — ELCTR BOVIE TIP CLEANER SCRATCH PAD

## (undated) DEVICE — TUBING ATS SUCTION LINE

## (undated) DEVICE — STRYKER INTERPULSE HANDPIECE W IRR SUCTION TUBE

## (undated) DEVICE — DRAPE LIGHT HANDLE COVER (BLUE)

## (undated) DEVICE — MEDTRONIC URCHIN EVO HEART POSITIONER & CANISTER TUBING SET

## (undated) DEVICE — SOL IRR POUR NS 0.9% 500ML

## (undated) DEVICE — DRAPE 3/4 SHEET W REINFORCEMENT 56X77"

## (undated) DEVICE — FOLEY TRAY 16FR 5CC LF LUBRISIL ADVANCE TEMP CLOSED

## (undated) DEVICE — SOL IRR BAG NS 0.9% 3000ML

## (undated) DEVICE — SUT SURGICAL STEEL 6 30" BP-1

## (undated) DEVICE — Device

## (undated) DEVICE — SUT DOUBLE 6 WIRE STERNAL

## (undated) DEVICE — DRAPE TOWEL BLUE 17" X 24"

## (undated) DEVICE — PACING CABLE A/V TEMP SCREW DOWN 6FT

## (undated) DEVICE — BLADE SCALPEL SAFETYLOCK #10

## (undated) DEVICE — SUT STAINLESS STEEL 5 18" SCC

## (undated) DEVICE — TUBING SUCTION 20FT

## (undated) DEVICE — DRAIN CHANNEL 19FR ROUND FULL FLUTED

## (undated) DEVICE — POSITIONER FOAM EGG CRATE ULNAR 2PCS (PINK)

## (undated) DEVICE — DRSG OPSITE 13.75 X 4"

## (undated) DEVICE — WARMING BLANKET DUO-THERM HYPER/HYPOTHERM ADULT

## (undated) DEVICE — GLV 8.5 PROTEXIS (WHITE)

## (undated) DEVICE — CHEST DRAIN PLEUR-EVAC WET/WET ADULT-PEDS SINGLE (QUICK)

## (undated) DEVICE — SUT BIOSYN 4-0 18" P-12

## (undated) DEVICE — ELCTR HEX BLADE

## (undated) DEVICE — SUCTION YANKAUER NO CONTROL VENT

## (undated) DEVICE — GLV 7.5 PROTEXIS (WHITE)

## (undated) DEVICE — VISITEC 4X4

## (undated) DEVICE — CONNECTOR STRAIGHT 3/8 X 3/8"

## (undated) DEVICE — SPECIMEN CONTAINER 100ML

## (undated) DEVICE — FILTER REINFUSION FOR SALVAGED BLOOD DISP

## (undated) DEVICE — SUT SILK 0 30" TIES

## (undated) DEVICE — SAW BLADE MICROAIRE STERNUM 1X34X9.4MM

## (undated) DEVICE — NDL COUNTER FOAM AND MAGNET 40-70

## (undated) DEVICE — DRAIN RESERVOIR FOR JACKSON PRATT 100CC CARDINAL

## (undated) DEVICE — DRSG STERISTRIPS 0.5 X 4"

## (undated) DEVICE — SUT GORETEX CV-5 (4-0) 36" TTC-13

## (undated) DEVICE — SUT PROLENE 4-0 36" SH

## (undated) DEVICE — SUT PROLENE 5-0 30" RB-2

## (undated) DEVICE — SUT PROLENE 7-0 24" BV175-8 MULTIPASS

## (undated) DEVICE — BLADE SCALPEL SAFETYLOCK #15

## (undated) DEVICE — MARKING PEN W RULER

## (undated) DEVICE — DRAPE 1/2 SHEET 40X57"

## (undated) DEVICE — ADAPTOR DLP "Y" FEMALE ON SINGLE LEG 3.5" X 10"

## (undated) DEVICE — GOWN TRIMAX XXL

## (undated) DEVICE — NDL HYPO SAFE 18G X 1.5" (PINK)

## (undated) DEVICE — PACK UNIVERSAL CARDIAC

## (undated) DEVICE — DRSG TEGADERM 6"X8"

## (undated) DEVICE — SUMP INTRACARDIAC 20FR 1/4" ADULT

## (undated) DEVICE — BLADE SCALPEL SAFETYLOCK #11

## (undated) DEVICE — DRSG DERMABOND PRINEO 60CM

## (undated) DEVICE — PACK UNIVERSAL CARDIAC SUPPLEMNTAL B

## (undated) DEVICE — SUCTION TUBE CARDIAC SOFT TIP 6FR SHAFT 10FR TIP 6"

## (undated) DEVICE — SENSOR MYOCARDIAL TEMP 15MM

## (undated) DEVICE — CONNECTOR STRAIGHT 3/8 X 1/2"

## (undated) DEVICE — SOL IRR POUR H2O 250ML

## (undated) DEVICE — NDL TAPR FR EYE 1/2 CIR 3

## (undated) DEVICE — TUBING KIT FAST START ATF 40

## (undated) DEVICE — SUT PROLENE 3-0 36" SH

## (undated) DEVICE — PREP BETADINE SPONGE STICKS

## (undated) DEVICE — LAP PAD 18 X 18"

## (undated) DEVICE — GLV 6.5 PROTEXIS (WHITE)

## (undated) DEVICE — SUT BLUNT SZ 5

## (undated) DEVICE — SUT SOFSILK 0 30" V-20

## (undated) DEVICE — SYR LUER LOK 30CC

## (undated) DEVICE — DRAPE MAYO STAND 30"

## (undated) DEVICE — PREP DURAPREP 26CC

## (undated) DEVICE — SUT PROLENE 5-0 36" RB-1

## (undated) DEVICE — CONNECTOR STRAIGHT 1/2 X 1/2"

## (undated) DEVICE — SPONGE PEANUT AUTO COUNT

## (undated) DEVICE — SWITCH ARISS TABLE MOUNT UNEQUAL LEGS 13"

## (undated) DEVICE — SWITCH ARISS TABLE MOUNT EQUAL LEGS 13"

## (undated) DEVICE — SUT TICRON 2-0 36" CV-316 DA

## (undated) DEVICE — SYR LUER LOK 1CC

## (undated) DEVICE — CONNECTOR "Y" 1/2 X 3/8 X 3/8"

## (undated) DEVICE — SUT BOOT STANDARD (ASSORTED) 5 PAIR

## (undated) DEVICE — SUT TICRON 4-0 36" CV-331 DA